# Patient Record
Sex: FEMALE | Race: WHITE | NOT HISPANIC OR LATINO | URBAN - METROPOLITAN AREA
[De-identification: names, ages, dates, MRNs, and addresses within clinical notes are randomized per-mention and may not be internally consistent; named-entity substitution may affect disease eponyms.]

---

## 2017-03-30 ENCOUNTER — OFFICE (AMBULATORY)
Dept: URBAN - METROPOLITAN AREA CLINIC 64 | Facility: CLINIC | Age: 67
End: 2017-03-30

## 2017-03-30 VITALS
SYSTOLIC BLOOD PRESSURE: 127 MMHG | HEART RATE: 70 BPM | DIASTOLIC BLOOD PRESSURE: 79 MMHG | HEIGHT: 66 IN | WEIGHT: 205 LBS

## 2017-03-30 DIAGNOSIS — K57.32 DIVERTICULITIS OF LARGE INTESTINE WITHOUT PERFORATION OR ABS: ICD-10-CM

## 2017-03-30 PROCEDURE — 99214 OFFICE O/P EST MOD 30 MIN: CPT

## 2017-03-30 RX ORDER — POLYETHYLENE GLYCOL 3350 17 G/17G
17 POWDER, FOR SOLUTION ORAL
Qty: 1 | Refills: 11 | Status: COMPLETED
Start: 2017-03-30 | End: 2023-07-11

## 2017-04-27 ENCOUNTER — HOSPITAL ENCOUNTER (OUTPATIENT)
Dept: MAMMOGRAPHY | Facility: HOSPITAL | Age: 67
Discharge: HOME | End: 2017-04-27

## 2018-04-05 ENCOUNTER — HOSPITAL ENCOUNTER (OUTPATIENT)
Dept: FAMILY MEDICINE CLINIC | Facility: CLINIC | Age: 68
Setting detail: SPECIMEN
Discharge: HOME | End: 2018-04-05
Attending: HOSPITALIST | Admitting: HOSPITALIST

## 2018-04-05 LAB
ALBUMIN SERPL-MCNC: 3.8 G/DL (ref 3.5–4.8)
ALBUMIN/GLOB SERPL: 1.2 {RATIO} (ref 1–1.7)
ALP SERPL-CCNC: 74 IU/L (ref 32–91)
ALT SERPL-CCNC: 19 IU/L (ref 14–54)
ANION GAP SERPL CALC-SCNC: 10.7 MMOL/L (ref 10–20)
AST SERPL-CCNC: 21 IU/L (ref 15–41)
BASOPHILS # BLD AUTO: 0.1 10*3/UL (ref 0–0.2)
BASOPHILS NFR BLD AUTO: 1 % (ref 0–2)
BILIRUB SERPL-MCNC: 0.4 MG/DL (ref 0.3–1.2)
BUN SERPL-MCNC: 12 MG/DL (ref 8–20)
BUN/CREAT SERPL: 17.1 (ref 5.4–26.2)
CALCIUM SERPL-MCNC: 9 MG/DL (ref 8.9–10.3)
CHLORIDE SERPL-SCNC: 101 MMOL/L (ref 101–111)
CHOLEST SERPL-MCNC: 198 MG/DL
CHOLEST/HDLC SERPL: 3.4 {RATIO}
CONV CO2: 29 MMOL/L (ref 22–32)
CONV LDL CHOLESTEROL DIRECT: 98 MG/DL (ref 0–100)
CONV TOTAL PROTEIN: 7.1 G/DL (ref 6.1–7.9)
CREAT UR-MCNC: 0.7 MG/DL (ref 0.4–1)
DIFFERENTIAL METHOD BLD: (no result)
EOSINOPHIL # BLD AUTO: 0.2 10*3/UL (ref 0–0.3)
EOSINOPHIL # BLD AUTO: 2 % (ref 0–3)
ERYTHROCYTE [DISTWIDTH] IN BLOOD BY AUTOMATED COUNT: 13.5 % (ref 11.5–14.5)
GLOBULIN UR ELPH-MCNC: 3.3 G/DL (ref 2.5–3.8)
GLUCOSE SERPL-MCNC: 96 MG/DL (ref 65–99)
HCT VFR BLD AUTO: 44.1 % (ref 35–49)
HDLC SERPL-MCNC: 59 MG/DL
HGB BLD-MCNC: 14.7 G/DL (ref 12–15)
LDLC/HDLC SERPL: 1.7 {RATIO}
LIPID INTERPRETATION: ABNORMAL
LYMPHOCYTES # BLD AUTO: 2.4 10*3/UL (ref 0.8–4.8)
LYMPHOCYTES NFR BLD AUTO: 26 % (ref 18–42)
MCH RBC QN AUTO: 30.6 PG (ref 26–32)
MCHC RBC AUTO-ENTMCNC: 33.3 G/DL (ref 32–36)
MCV RBC AUTO: 91.9 FL (ref 80–94)
MONOCYTES # BLD AUTO: 0.6 10*3/UL (ref 0.1–1.3)
MONOCYTES NFR BLD AUTO: 7 % (ref 2–11)
NEUTROPHILS # BLD AUTO: 5.8 10*3/UL (ref 2.3–8.6)
NEUTROPHILS NFR BLD AUTO: 64 % (ref 50–75)
NRBC BLD AUTO-RTO: 0 /100{WBCS}
NRBC/RBC NFR BLD MANUAL: 0 10*3/UL
PLATELET # BLD AUTO: 322 10*3/UL (ref 150–450)
PMV BLD AUTO: 6.7 FL (ref 7.4–10.4)
POTASSIUM SERPL-SCNC: 3.7 MMOL/L (ref 3.6–5.1)
RBC # BLD AUTO: 4.8 10*6/UL (ref 4–5.4)
SODIUM SERPL-SCNC: 137 MMOL/L (ref 136–144)
TRIGL SERPL-MCNC: 214 MG/DL
VLDLC SERPL CALC-MCNC: 40.5 MG/DL
WBC # BLD AUTO: 9 10*3/UL (ref 4.5–11.5)

## 2018-04-06 LAB — H PYLORI IGG SER IA-ACNC: NEGATIVE

## 2018-08-17 ENCOUNTER — HOSPITAL ENCOUNTER (OUTPATIENT)
Dept: OTHER | Facility: HOSPITAL | Age: 68
Setting detail: SPECIMEN
Discharge: HOME | End: 2018-08-17
Attending: INTERNAL MEDICINE | Admitting: INTERNAL MEDICINE

## 2018-08-17 ENCOUNTER — ON CAMPUS - OUTPATIENT (AMBULATORY)
Dept: URBAN - METROPOLITAN AREA HOSPITAL 2 | Facility: HOSPITAL | Age: 68
End: 2018-08-17

## 2018-08-17 ENCOUNTER — OFFICE (AMBULATORY)
Dept: URBAN - METROPOLITAN AREA PATHOLOGY 4 | Facility: PATHOLOGY | Age: 68
End: 2018-08-17

## 2018-08-17 VITALS
HEART RATE: 59 BPM | SYSTOLIC BLOOD PRESSURE: 108 MMHG | RESPIRATION RATE: 20 BRPM | TEMPERATURE: 97.2 F | HEART RATE: 62 BPM | OXYGEN SATURATION: 99 % | DIASTOLIC BLOOD PRESSURE: 69 MMHG | SYSTOLIC BLOOD PRESSURE: 98 MMHG | HEART RATE: 70 BPM | HEART RATE: 54 BPM | OXYGEN SATURATION: 92 % | WEIGHT: 210 LBS | RESPIRATION RATE: 18 BRPM | DIASTOLIC BLOOD PRESSURE: 62 MMHG | OXYGEN SATURATION: 98 % | HEART RATE: 57 BPM | DIASTOLIC BLOOD PRESSURE: 57 MMHG | HEART RATE: 81 BPM | HEART RATE: 60 BPM | DIASTOLIC BLOOD PRESSURE: 56 MMHG | RESPIRATION RATE: 15 BRPM | SYSTOLIC BLOOD PRESSURE: 136 MMHG | OXYGEN SATURATION: 96 % | SYSTOLIC BLOOD PRESSURE: 109 MMHG | DIASTOLIC BLOOD PRESSURE: 66 MMHG | OXYGEN SATURATION: 94 % | SYSTOLIC BLOOD PRESSURE: 88 MMHG | SYSTOLIC BLOOD PRESSURE: 104 MMHG | RESPIRATION RATE: 16 BRPM | SYSTOLIC BLOOD PRESSURE: 106 MMHG | OXYGEN SATURATION: 95 % | DIASTOLIC BLOOD PRESSURE: 68 MMHG | HEART RATE: 56 BPM | SYSTOLIC BLOOD PRESSURE: 117 MMHG | HEIGHT: 66 IN | DIASTOLIC BLOOD PRESSURE: 88 MMHG

## 2018-08-17 DIAGNOSIS — D12.3 BENIGN NEOPLASM OF TRANSVERSE COLON: ICD-10-CM

## 2018-08-17 DIAGNOSIS — K64.0 FIRST DEGREE HEMORRHOIDS: ICD-10-CM

## 2018-08-17 DIAGNOSIS — K22.2 ESOPHAGEAL OBSTRUCTION: ICD-10-CM

## 2018-08-17 DIAGNOSIS — R13.10 DYSPHAGIA, UNSPECIFIED: ICD-10-CM

## 2018-08-17 DIAGNOSIS — Z86.010 PERSONAL HISTORY OF COLONIC POLYPS: ICD-10-CM

## 2018-08-17 DIAGNOSIS — K57.30 DIVERTICULOSIS OF LARGE INTESTINE WITHOUT PERFORATION OR ABS: ICD-10-CM

## 2018-08-17 DIAGNOSIS — K22.5 DIVERTICULUM OF ESOPHAGUS, ACQUIRED: ICD-10-CM

## 2018-08-17 LAB
GI HISTOLOGY: A. UNSPECIFIED: (no result)
GI HISTOLOGY: PDF REPORT: (no result)

## 2018-08-17 PROCEDURE — 43235 EGD DIAGNOSTIC BRUSH WASH: CPT | Mod: 59 | Performed by: INTERNAL MEDICINE

## 2018-08-17 PROCEDURE — 88305 TISSUE EXAM BY PATHOLOGIST: CPT | Mod: 26 | Performed by: INTERNAL MEDICINE

## 2018-08-17 PROCEDURE — 45385 COLONOSCOPY W/LESION REMOVAL: CPT | Mod: PT | Performed by: INTERNAL MEDICINE

## 2018-08-17 PROCEDURE — 43450 DILATE ESOPHAGUS 1/MULT PASS: CPT | Performed by: INTERNAL MEDICINE

## 2018-08-20 ENCOUNTER — HOSPITAL ENCOUNTER (OUTPATIENT)
Dept: FAMILY MEDICINE CLINIC | Facility: CLINIC | Age: 68
Setting detail: SPECIMEN
Discharge: HOME | End: 2018-08-20
Attending: HOSPITALIST | Admitting: HOSPITALIST

## 2018-08-20 LAB
AMPICILLIN SUSC ISLT: NORMAL
AZTREONAM SUSC ISLT: NORMAL
BACTERIA ISLT: NORMAL
BACTERIA SPEC AEROBE CULT: NORMAL
CEFAZOLIN SUSC ISLT: NORMAL
CEFEPIME SUSC ISLT: NORMAL
CEFTAZIDIME SUSC ISLT: NORMAL
CEFTRIAXONE SUSC ISLT: NORMAL
COLONY COUNT: NORMAL
LEVOFLOXACIN SUSC ISLT: NORMAL
Lab: NORMAL
MEROPENEM SUSC ISLT: NORMAL
MICRO REPORT STATUS: NORMAL
NITROFURANTOIN SUSC ISLT: NORMAL
PIP+TAZO SUSC ISLT: NORMAL
SPECIMEN SOURCE: NORMAL
SUSC METH SPEC: NORMAL
TETRACYCLINE SUSC ISLT: NORMAL
TOBRAMYCIN SUSC ISLT: NORMAL
TRIMETHOPRIM/SULFA: NORMAL

## 2018-11-27 ENCOUNTER — HOSPITAL ENCOUNTER (OUTPATIENT)
Dept: FAMILY MEDICINE CLINIC | Facility: CLINIC | Age: 68
Setting detail: SPECIMEN
Discharge: HOME | End: 2018-11-27
Attending: HOSPITALIST | Admitting: HOSPITALIST

## 2018-11-27 LAB
AMPICILLIN SUSC ISLT: NORMAL
AZTREONAM SUSC ISLT: NORMAL
BACTERIA ISLT: NORMAL
BACTERIA SPEC AEROBE CULT: NORMAL
CEFAZOLIN SUSC ISLT: NORMAL
CEFEPIME SUSC ISLT: NORMAL
CEFTAZIDIME SUSC ISLT: NORMAL
CEFTRIAXONE SUSC ISLT: NORMAL
CIPROFLOXACIN SUSC ISLT: NORMAL
COLONY COUNT: NORMAL
ERTAPENEM SUSC ISLT: NORMAL
LEVOFLOXACIN SUSC ISLT: NORMAL
Lab: NORMAL
MEROPENEM SUSC ISLT: NORMAL
MICRO REPORT STATUS: NORMAL
NITROFURANTOIN SUSC ISLT: NORMAL
PIP+TAZO SUSC ISLT: NORMAL
SPECIMEN SOURCE: NORMAL
SUSC METH SPEC: NORMAL
TETRACYCLINE SUSC ISLT: NORMAL
TOBRAMYCIN SUSC ISLT: NORMAL
TRIMETHOPRIM/SULFA: NORMAL

## 2019-02-20 ENCOUNTER — HOSPITAL ENCOUNTER (OUTPATIENT)
Dept: SPEECH THERAPY | Facility: HOSPITAL | Age: 69
Setting detail: RECURRING SERIES
Discharge: HOME | End: 2019-04-27
Attending: OTOLARYNGOLOGY | Admitting: OTOLARYNGOLOGY

## 2019-03-26 ENCOUNTER — HOSPITAL ENCOUNTER (OUTPATIENT)
Dept: MAMMOGRAPHY | Facility: HOSPITAL | Age: 69
Discharge: HOME | End: 2019-03-26
Attending: HOSPITALIST | Admitting: HOSPITALIST

## 2019-03-26 ENCOUNTER — HOSPITAL ENCOUNTER (OUTPATIENT)
Dept: FAMILY MEDICINE CLINIC | Facility: CLINIC | Age: 69
Setting detail: SPECIMEN
Discharge: HOME | End: 2019-03-26
Attending: HOSPITALIST | Admitting: HOSPITALIST

## 2019-03-27 LAB — MYELOPEROXIDASE AB SER-ACNC: <0.2 U

## 2019-04-02 ENCOUNTER — HOSPITAL ENCOUNTER (OUTPATIENT)
Dept: CT IMAGING | Facility: HOSPITAL | Age: 69
Discharge: HOME | End: 2019-04-02
Attending: HOSPITALIST | Admitting: HOSPITALIST

## 2019-04-02 LAB — CREAT BLDA-MCNC: 0.9 MG/DL (ref 0.6–1.3)

## 2019-05-30 ENCOUNTER — HOSPITAL ENCOUNTER (OUTPATIENT)
Dept: FAMILY MEDICINE CLINIC | Facility: CLINIC | Age: 69
Setting detail: SPECIMEN
Discharge: HOME | End: 2019-05-30
Attending: HOSPITALIST | Admitting: HOSPITALIST

## 2019-05-30 ENCOUNTER — CONVERSION ENCOUNTER (OUTPATIENT)
Dept: FAMILY MEDICINE CLINIC | Facility: CLINIC | Age: 69
End: 2019-05-30

## 2019-05-30 LAB
ALBUMIN SERPL-MCNC: 3.7 G/DL (ref 3.5–4.8)
ALBUMIN/GLOB SERPL: 1.1 {RATIO} (ref 1–1.7)
ALP SERPL-CCNC: 89 IU/L (ref 32–91)
ALT SERPL-CCNC: 23 IU/L (ref 14–54)
ANION GAP SERPL CALC-SCNC: 14 MMOL/L (ref 10–20)
AST SERPL-CCNC: 23 IU/L (ref 15–41)
BASOPHILS # BLD AUTO: 0.1 10*3/UL (ref 0–0.2)
BASOPHILS NFR BLD AUTO: 1 % (ref 0–2)
BILIRUB SERPL-MCNC: 0.2 MG/DL (ref 0.3–1.2)
BUN SERPL-MCNC: 8 MG/DL (ref 8–20)
BUN/CREAT SERPL: 10 (ref 5.4–26.2)
CALCIUM SERPL-MCNC: 9.3 MG/DL (ref 8.9–10.3)
CHLORIDE SERPL-SCNC: 100 MMOL/L (ref 101–111)
CHOLEST SERPL-MCNC: 212 MG/DL
CHOLEST/HDLC SERPL: 4.2 {RATIO}
CONV CO2: 28 MMOL/L (ref 22–32)
CONV LDL CHOLESTEROL DIRECT: 125 MG/DL (ref 0–100)
CONV TOTAL PROTEIN: 7.2 G/DL (ref 6.1–7.9)
CREAT UR-MCNC: 0.8 MG/DL (ref 0.4–1)
DIFFERENTIAL METHOD BLD: (no result)
EOSINOPHIL # BLD AUTO: 0.2 10*3/UL (ref 0–0.3)
EOSINOPHIL # BLD AUTO: 2 % (ref 0–3)
ERYTHROCYTE [DISTWIDTH] IN BLOOD BY AUTOMATED COUNT: 13.7 % (ref 11.5–14.5)
FOLATE SERPL-MCNC: 21.5 NG/ML (ref 5.9–24.8)
GLOBULIN UR ELPH-MCNC: 3.5 G/DL (ref 2.5–3.8)
GLUCOSE SERPL-MCNC: 100 MG/DL (ref 65–99)
HCT VFR BLD AUTO: 44.8 % (ref 35–49)
HDLC SERPL-MCNC: 51 MG/DL
HGB BLD-MCNC: 14.8 G/DL (ref 12–15)
LDLC/HDLC SERPL: 2.5 {RATIO}
LIPID INTERPRETATION: ABNORMAL
LYMPHOCYTES # BLD AUTO: 2 10*3/UL (ref 0.8–4.8)
LYMPHOCYTES NFR BLD AUTO: 20 % (ref 18–42)
MCH RBC QN AUTO: 30.5 PG (ref 26–32)
MCHC RBC AUTO-ENTMCNC: 33 G/DL (ref 32–36)
MCV RBC AUTO: 92.3 FL (ref 80–94)
MONOCYTES # BLD AUTO: 1 10*3/UL (ref 0.1–1.3)
MONOCYTES NFR BLD AUTO: 10 % (ref 2–11)
NEUTROPHILS # BLD AUTO: 6.6 10*3/UL (ref 2.3–8.6)
NEUTROPHILS NFR BLD AUTO: 67 % (ref 50–75)
NRBC BLD AUTO-RTO: 0 /100{WBCS}
NRBC/RBC NFR BLD MANUAL: 0 10*3/UL
PLATELET # BLD AUTO: 321 10*3/UL (ref 150–450)
PMV BLD AUTO: 7 FL (ref 7.4–10.4)
POTASSIUM SERPL-SCNC: 4 MMOL/L (ref 3.6–5.1)
RBC # BLD AUTO: 4.85 10*6/UL (ref 4–5.4)
SODIUM SERPL-SCNC: 138 MMOL/L (ref 136–144)
T4 FREE SERPL-MCNC: 0.9 NG/DL (ref 0.58–1.64)
TRIGL SERPL-MCNC: 288 MG/DL
TSH SERPL-ACNC: 0.66 UIU/ML (ref 0.34–5.6)
VIT B12 SERPL-MCNC: 477 PG/ML (ref 180–914)
VLDLC SERPL CALC-MCNC: 36.6 MG/DL
WBC # BLD AUTO: 9.8 10*3/UL (ref 4.5–11.5)

## 2019-05-31 LAB — 25(OH)D3 SERPL-MCNC: 37 NG/ML (ref 30–100)

## 2019-06-04 VITALS
SYSTOLIC BLOOD PRESSURE: 118 MMHG | DIASTOLIC BLOOD PRESSURE: 68 MMHG | BODY MASS INDEX: 36 KG/M2 | RESPIRATION RATE: 12 BRPM | HEIGHT: 66 IN | WEIGHT: 224 LBS | HEART RATE: 80 BPM

## 2019-06-06 NOTE — PROGRESS NOTES
Vital Signs:    Patient Profile:    69 Years Old Female  Height:     66 inches  Weight:     224 pounds  BMI:        36.15     Temp:       98.1 degrees F oral  Pulse rate: 80 / minute  Resp:       12 per minute  BP Sittin / 68  (left arm)        Problems: Active problems were reviewed with the patient during this visit.  Medications: Medications were reviewed with the patient during this visit.  Allergies: Allergies were reviewed with the patient during this visit.        Vitals Entered By: Helene LUZ (May 30, 2019 10:23 AM)      Referring Provider:  Jordy Hou MD  Primary Provider:  Jordy Hou MD    CC:  medicare wellness.    History of Present Illness:         The patient comes in today for a Annual exam.  Here for wellness and physical.  Still with dysphonia and has seen 4 different doctors and still no better.           When questioned about any new concerns, the patient voices concerns with nothing in particular.  When asked about their health maintenence items, they report their Colonoscopy is UTD and Mammogram is UTD.      ROS: The review of systems is negative for chest pain, shortness of breath, headache, vision change, nausea, vomiting, diarrhea, abdominal pain, bowel or bladder problems, significant joint problems, or skin related issues        Past Medical History:     Reviewed history from 2014 and no changes required:        Health Maintenence: PAP (S/P Hysterectomy)        GYN taking care of this        Health Maintenence: Mammogram (UTD)        Health Maintenence: Colonoscopy (last done )        Health Maintenence: Colonoscopy (next one is needed in 3 years)        HEENT Hx: TMJ syndrome        Respiratory Hx: histoplasmosis        Musculoskeletal Hx: calcaneal fracture        Colon polyps        Hyperlipidemia        Migraine headaches        Osteoarthritis        Allergies        Generalized anxiety disorder        Hypertension    Past Surgical  History:     Reviewed history from 12/06/2012 and no changes required:        cholecystectomy        partial hysterectomy        rt ear        cataracts    Family History Summary:      Reviewed history Last on 03/26/2019 and no changes required:05/30/2019      General Comments - FH:  FH Heart Disease  FH Lung/Respiratory Disease  FH Colon Cancer  Family History of Lymphoma  FH Thyroid Problems  Rh. Arthritis  Parkinson's   COPD      Social History:     Reviewed history from 06/05/2018 and no changes required:        Patient has never smoked.        Passive Smoke: N        Alcohol Use: N                Risk Factors:     Smoked Tobacco Use:  Never smoker  Smokeless Tobacco Use:  Never    Previous Tobacco Use: Signed On - 03/26/2019  Smoked Tobacco Use:  Never smoker  Smokeless Tobacco Use:  Never  Passive smoke exposure:  no    Previous Alcohol Use: Signed On - 03/26/2019  Alcohol use:  no  Seatbelt use:  100 %    Colonoscopy History:     Date of Last Colonoscopy:  05/20/2015    Mammogram History:     Date of Last Mammogram:  04/27/2017        Physical Exam   Height:  66  Weight:  223  BP:  118/68 mm HG    Medication List:  SINGULAIR 10 MG ORAL TABLET (MONTELUKAST SODIUM) one tablet by mouth daily  BACTRIM -160 MG ORAL TABLET (SULFAMETHOXAZOLE-TRIMETHOPRIM) 1 po bid  TRANSDERM-SCOP (1.5 MG) 1 MG/3DAYS TRANSDERMAL PATCH 72 HOUR (SCOPOLAMINE BASE) apply one patch 4 hour prior to departure then one every 3 days  OMEPRAZOLE 40MG CAPSULES (OMEPRAZOLE) TAKE ONE CAPSULE BY MOUTH DAILY  FLUTICASONE 50MCG NASAL SP (120) RX (FLUTICASONE PROPIONATE) USE 2 SPRAYS IN EACH NOSTRIL DAILY  ESCITALOPRAM 10MG TABLETS (ESCITALOPRAM OXALATE) TAKE 1 TABLET BY MOUTH EVERY DAY  ATIVAN 1 MG ORAL TABLET (LORAZEPAM) take 1-2  at bedtime  AMLODIPINE-BENAZ 5/10MG CAPSULES (AMLODIPINE BESY-BENAZEPRIL HCL) TAKE 1 CAPSULE BY MOUTH EVERY DAY      Depression   During the past month have you often been bothered by feeling down, depressed, or  hopeless? No  During the past month have you often been bothered by little interest or pleasure in doing things? No  Reviewed Depression Screening Questionnaire Yes    Surgical History   cholecystectomy  partial hysterectomy  rt ear  cataracts,    Risk Factors  Tobacco Use: Never smoker  Passive smoke exposure: no    Advanced directives discussed:   yes  Advanced directives reviewed:  yes    Level of Function   Hearing Impairment   Do you have a hearing problem now? Yes     Hearing Device none    Olson Index of Mcallen in Activities of Daily Living  BATHING: Mcallen  DRESSING: Mcallen  TOILETING: Mcallen  TRANSFERRING: Mcallen  CONTINENCE: Mcallen  FEEDING: Mcallen  ADL Score: 0  The patient is independent.    Home Safety   Do you have adequate housing? yes  Do you have transportation? yes  Do you have sufficient food? yes  Do you have access to a telephone? yes  Actual or threats of physical, emotional abuse? no  Do you have actual or threats of sexual abuse? no  Do you feel safe at home? yes  Home Safety Assessment Needed?   no    Falls Information:   Falls in the past 2 months? no  Falls in the past 6 months? no  CNS to follow up? no    Balance:   Are there difficulties with balance? no  Have you ever experienced lightheadedness/dizziness w/ position changes? no  Do you have episodes of dizziness? no  Have you experienced blackouts? no    Hearing Impairment Assessment   Reviewed  List of providers and suppliers caring for patient:    Dr. Merida -ENT    Physical Examination   General Appearance   In no acute distress.  Alert & oriented.  Behavior and affect appropriate to situation  Skin   No suspicious lesions, moles or rashes . Turgor good.  HEENT   PERRLA, EOMI, TM's normal.  Pharynx clear  Neck   Supple.  No adenopathy  Cardiovascular   Regular rate and rhythm  Lungs   Clear to auscultation  Abdomen   Soft, non-tender.  Bowel sounds present.  No hepatosplenomegaly  Back    degenerative changes  Musculoskeletal   OA changes, degenerative changes  Neurologic   CN grossly intact  Psych   Alert and cooperative; normal mood and affect; normal attention span and concentration        Impression & Recommendations:    Problem # 1:  Preventive Health Care Exam (ICD-V70.0) (UIR33-L14.00)  This patient overall looks good related to their annual checkup.  Problem areas were addressed and they were encouraged to continue good lifestyle habits and behaviors.    Orders:  Medicare-Subsequent Year (AWV) ()  [facility] CBC W/DIFF; PATH REVIEW IF INDICATED (CBC)  FM LIPID PANEL (LIPID)  FM COMPREHENSIVE METABOLIC PANEL (CMP) (MPC)  FM VITAMIN B12 (B12)  [facility] FOLATE (FOL)  [facility] VITAMIN D TOTAL (VITD)  [facility] THYROID STIMULATING HORMONE (TSH) (TSH)  [facility] T4 THYROXINE FREE (FT4)      Problem # 2:  ITCHING AND PRURITIS (ICD-698.9) (KYM57-D39.9)  continue meds  Orders:  Medicare-Subsequent Year (AWV) ()  [facility] CBC W/DIFF; PATH REVIEW IF INDICATED (CBC)  FM LIPID PANEL (LIPID)  [facility] COMPREHENSIVE METABOLIC PANEL (CMP) (MPC)  [facility] VITAMIN B12 (B12)  [facility] FOLATE (FOL)  [facility] VITAMIN D TOTAL (VITD)  [facility] THYROID STIMULATING HORMONE (TSH) (TSH)  [facility] T4 THYROXINE FREE (FT4)      Problem # 3:  OSTEOARTHRITIS (ICD-715.09) (GDN35-X98.9)  stable    Problem # 4:  HOARSENESS (ICD-784.49) (GIV14-K81.3)  may need another opinon  Orders:  Medicare-Subsequent Year (AWV) ()  [facility] CBC W/DIFF; PATH REVIEW IF INDICATED (CBC)  [facility] LIPID PANEL (LIPID)  [facility] COMPREHENSIVE METABOLIC PANEL (CMP) (MPC)  [facility] VITAMIN B12 (B12)  [facility] FOLATE (FOL)  [facility] VITAMIN D TOTAL (VITD)  [facility] THYROID STIMULATING HORMONE (TSH) (TSH)  [facility] T4 THYROXINE FREE (FT4)      Problem # 5:  HYPERTENSION (ICD-401.9) (IXI97-U53)    Her updated medication list for this problem includes:     Amlodipine-benaz 5/10mg Capsules (Amlodipine besy-benazepril hcl) ..... Take 1 capsule by mouth every day    Orders:  Medicare-Subsequent Year (AWV) ()  [facility] CBC W/DIFF; PATH  REVIEW IF INDICATED (CBC)  [facility] LIPID PANEL (LIPID)  [facility] COMPREHENSIVE METABOLIC PANEL (CMP) (MPC)  [facility] VITAMIN B12 (B12)  [facility] FOLATE (FOL)  [facility] VITAMIN D TOTAL (VITD)  [facility] THYROID STIMULATING HORMONE (TSH) (TSH)  [facility] T4 THYROXINE FREE (FT4)    BP today: 118/68  Prior BP: 108/76 (03/26/2019)    Labs Reviewed:  Creat: 0.7 (04/05/2018)  Chol: 198 (04/05/2018)   HDL: 59 (04/05/2018)   LDL: 98 (04/05/2018)         Problem # 6:  Sinusitis Acute (ICD-461.9) (AVK99-P17.90)    The following medications were removed from the medication list:     Bactrim Ds 800-160 Mg Oral Tablet (Sulfamethoxazole-trimethoprim) ..... 1 po bid    Her updated medication list for this problem includes:     Cefuroxime Axetil 500 Mg Oral Tablet (Cefuroxime axetil) ..... 1 tablet bid     Fluticasone 50mcg Nasal Sp (120) Rx (Fluticasone propionate) ..... Use 2 sprays in each nostril daily    Orders:  Medicare-Subsequent Year (AWV) ()  [facility] CBC W/DIFF; PATH REVIEW IF INDICATED (CBC)  [facility] LIPID PANEL (LIPID)  [facility] COMPREHENSIVE METABOLIC PANEL (CMP) (MPC)  [facility] VITAMIN B12 (B12)  [facility] FOLATE (FOL)  [facility] VITAMIN D TOTAL (VITD)  [facility] THYROID STIMULATING HORMONE (TSH) (TSH)  [facility] T4 THYROXINE FREE (FT4)      Medications Added to Medication List This Visit:  1)  Cefuroxime Axetil 500 Mg Oral Tablet (Cefuroxime axetil) .... 1 tablet bid      Patient Instructions:  1)  During this visit for their annual exam, we reviewed their personal history, social history and family history.  We went over their medications and all the recommended health maintenence items for their age group. They were given the opportunity to ask   questions and discuss other concerns.  2)  Please schedule a follow-up appointment as needed.                Medication Administration    Orders Added:  1)  Medicare-Subsequent Year (AWV) []  2)  [facility] CBC W/DIFF; PATH REVIEW IF INDICATED [CBC]  3)  [facility] LIPID PANEL [LIPID]  4)  [facility] COMPREHENSIVE METABOLIC PANEL (CMP) [MPC]  5)  [facility] VITAMIN  B12 [B12]  6)  [facility] FOLATE [FOL]  7)  [facility] VITAMIN D TOTAL [VITD]  8)  [facility] THYROID STIMULATING HORMONE (TSH) [TSH]  9)  [facility] T4 THYROXINE FREE [FT4]          Medications:  ESCITALOPRAM 10MG TABLETS (ESCITALOPRAM OXALATE) TAKE 1 TABLET BY MOUTH EVERY DAY  #90[Tablet] x 3      Entered and Authorized by:  Jordy Hou MD      Electronically signed by:   Jordy Hou MD on 05/30/2019      Method used:    Electronically to               [id number]* (retail)              [address] [place] [address]              Ph: [phone]              Fax: [phone]      RxID:   [id number]  AMLODIPINE-BENAZ 5/10MG CAPSULES (AMLODIPINE BESY-BENAZEPRIL HCL) TAKE 1 CAPSULE BY MOUTH EVERY DAY  #90[Capsule] x 3      Entered and Authorized by:  Jordy Hou MD      Electronically signed by:   Jordy Hou MD on 05/30/2019      Method used:    Electronically to               [facility] [address]* (retail)              [address] [place] [address]              Ph: [phone]              Fax: [phone]      RxID:   [id number]  CEFUROXIME AXETIL 500 MG ORAL TABLET (CEFUROXIME AXETIL) 1 tablet bid  #20[Tablet] x 0      Entered and Authorized by:  Jordy Hou MD      Electronically signed by:   Jordy Hou MD on 05/30/2019      Method used:    Electronically to               [facility] [id number]* (retail)              [address] [place] [address]              Ph: [phone]              Fax: [phone]      Note to Pharmacy: Route: ORAL;       RxID:   [id number]        PHQ-9  PHQ-9 completed, Score: 0 - Not Clinically Depressed        Electronically signed by Jordy Hou MD on 05/30/2019 at 10:49 AM  ________________________________________________________________________       Disclaimer: Converted Note message may not contain all data elements that existed  in the legacy source system. Please see [facility] for the original note details.

## 2019-07-03 ENCOUNTER — TELEPHONE (OUTPATIENT)
Dept: FAMILY MEDICINE CLINIC | Facility: CLINIC | Age: 69
End: 2019-07-03

## 2019-07-03 DIAGNOSIS — R49.0 HOARSENESS: ICD-10-CM

## 2019-07-03 DIAGNOSIS — R20.0 FACIAL NUMBNESS: Primary | ICD-10-CM

## 2019-07-03 NOTE — TELEPHONE ENCOUNTER
Patient came in to office stating she lives far away and was hoping to see Doctor. She states she has had this problem with her throat where she can hardly speak for the past year now but this past week has progressively gotten worse where now when she tries to speak the left side of her face and down to the left side of her arm goes numb and she feels very light headed.   She wants to know what Doctor would recommend for her to do? She doesn't want to go to Urgent care.

## 2019-07-03 NOTE — TELEPHONE ENCOUNTER
Please create order/referral and diagnosis in chart then this will fall in to my work queue to process referral.   PER [facility] TEAM       Thanks, Meghan

## 2019-07-11 RX ORDER — LORAZEPAM 1 MG/1
TABLET ORAL
Qty: 60 TABLET | Refills: 0 | Status: SHIPPED | OUTPATIENT
Start: 2019-07-11 | End: 2019-08-09 | Stop reason: SDUPTHER

## 2019-08-06 ENCOUNTER — TRANSCRIBE ORDERS (OUTPATIENT)
Dept: ADMINISTRATIVE | Facility: HOSPITAL | Age: 69
End: 2019-08-06

## 2019-08-06 DIAGNOSIS — J38.00 VOCAL CORD PARESIS: Primary | ICD-10-CM

## 2019-08-09 ENCOUNTER — LAB (OUTPATIENT)
Dept: FAMILY MEDICINE CLINIC | Facility: CLINIC | Age: 69
End: 2019-08-09

## 2019-08-09 ENCOUNTER — OFFICE VISIT (OUTPATIENT)
Dept: FAMILY MEDICINE CLINIC | Facility: CLINIC | Age: 69
End: 2019-08-09

## 2019-08-09 VITALS
DIASTOLIC BLOOD PRESSURE: 80 MMHG | TEMPERATURE: 98.8 F | RESPIRATION RATE: 8 BRPM | SYSTOLIC BLOOD PRESSURE: 120 MMHG | BODY MASS INDEX: 34.06 KG/M2 | HEART RATE: 88 BPM | WEIGHT: 211 LBS

## 2019-08-09 DIAGNOSIS — E78.2 MIXED HYPERLIPIDEMIA: ICD-10-CM

## 2019-08-09 DIAGNOSIS — R49.0 HOARSENESS: Primary | ICD-10-CM

## 2019-08-09 DIAGNOSIS — R49.0 HOARSENESS: ICD-10-CM

## 2019-08-09 PROBLEM — Z80.0 FAMILY HISTORY OF MALIGNANT NEOPLASM OF COLON: Status: ACTIVE | Noted: 2019-08-09

## 2019-08-09 PROBLEM — F41.1 GENERALIZED ANXIETY DISORDER: Status: ACTIVE | Noted: 2019-08-09

## 2019-08-09 PROBLEM — M19.90 OSTEOARTHRITIS: Status: ACTIVE | Noted: 2019-08-09

## 2019-08-09 PROBLEM — E78.5 HYPERLIPIDEMIA: Status: ACTIVE | Noted: 2019-08-09

## 2019-08-09 PROBLEM — K63.5 COLON POLYPS: Status: ACTIVE | Noted: 2019-08-09

## 2019-08-09 LAB
ALBUMIN SERPL-MCNC: 3.8 G/DL (ref 3.5–4.8)
ALBUMIN/GLOB SERPL: 1.2 G/DL (ref 1–1.7)
ALP SERPL-CCNC: 85 U/L (ref 32–91)
ALT SERPL W P-5'-P-CCNC: 24 U/L (ref 14–54)
ANION GAP SERPL CALCULATED.3IONS-SCNC: 14.2 MMOL/L (ref 5–15)
ARTICHOKE IGE QN: 117 MG/DL (ref 0–100)
AST SERPL-CCNC: 26 U/L (ref 15–41)
BILIRUB SERPL-MCNC: 0.8 MG/DL (ref 0.3–1.2)
BUN BLD-MCNC: 10 MG/DL (ref 8–20)
BUN/CREAT SERPL: 12.5 (ref 5.4–26.2)
CALCIUM SPEC-SCNC: 9.3 MG/DL (ref 8.9–10.3)
CHLORIDE SERPL-SCNC: 100 MMOL/L (ref 101–111)
CHOLEST SERPL-MCNC: 187 MG/DL
CO2 SERPL-SCNC: 26 MMOL/L (ref 22–32)
CREAT BLD-MCNC: 0.8 MG/DL (ref 0.4–1)
GFR SERPL CREATININE-BSD FRML MDRD: 72 ML/MIN/1.73
GLOBULIN UR ELPH-MCNC: 3.1 GM/DL (ref 2.5–3.8)
GLUCOSE BLD-MCNC: 97 MG/DL (ref 65–99)
HDLC SERPL QL: 4.07
HDLC SERPL-MCNC: 46 MG/DL
LDLC/HDLC SERPL: 2.12 {RATIO}
POTASSIUM BLD-SCNC: 4.2 MMOL/L (ref 3.6–5.1)
PROT SERPL-MCNC: 6.9 G/DL (ref 6.1–7.9)
SODIUM BLD-SCNC: 136 MMOL/L (ref 136–144)
TRIGL SERPL-MCNC: 218 MG/DL
VLDLC SERPL-MCNC: 43.6 MG/DL

## 2019-08-09 PROCEDURE — 80053 COMPREHEN METABOLIC PANEL: CPT | Performed by: HOSPITALIST

## 2019-08-09 PROCEDURE — 36415 COLL VENOUS BLD VENIPUNCTURE: CPT

## 2019-08-09 PROCEDURE — 80061 LIPID PANEL: CPT | Performed by: HOSPITALIST

## 2019-08-09 PROCEDURE — 99213 OFFICE O/P EST LOW 20 MIN: CPT | Performed by: HOSPITALIST

## 2019-08-09 RX ORDER — AMLODIPINE BESYLATE AND BENAZEPRIL HYDROCHLORIDE 5; 10 MG/1; MG/1
CAPSULE ORAL
COMMUNITY
Start: 2019-06-03 | End: 2020-06-24

## 2019-08-09 RX ORDER — LEVOCETIRIZINE DIHYDROCHLORIDE 5 MG/1
TABLET, FILM COATED ORAL
COMMUNITY
Start: 2019-08-03

## 2019-08-09 RX ORDER — LORAZEPAM 1 MG/1
1-2 TABLET ORAL
Qty: 60 TABLET | Refills: 0 | Status: SHIPPED | OUTPATIENT
Start: 2019-08-09 | End: 2019-09-23 | Stop reason: SDUPTHER

## 2019-08-09 RX ORDER — ESCITALOPRAM OXALATE 10 MG/1
TABLET ORAL
COMMUNITY
Start: 2019-06-03 | End: 2020-06-24

## 2019-08-09 RX ORDER — MONTELUKAST SODIUM 10 MG/1
TABLET ORAL
COMMUNITY
Start: 2019-08-03 | End: 2020-02-20 | Stop reason: SDUPTHER

## 2019-08-09 RX ORDER — OMEPRAZOLE 40 MG/1
CAPSULE, DELAYED RELEASE ORAL
COMMUNITY
Start: 2019-08-03 | End: 2019-11-21 | Stop reason: SDUPTHER

## 2019-08-09 NOTE — PROGRESS NOTES
Rooming Tab(CC,VS,Pt Hx,Fall Screen)    Patient Care Team:  Jordy Hou MD as PCP - General  Jordy Hou MD as PCP - Claims Attributed  Provider, No Known as PCP - Family Medicine    Chief Complaint   Patient presents with   • Sore Throat     lose of voice       Subjective     History was provided by the patient.  Here for f/u on voice issue.  Saw laryngologist in [place].  Left arm tingles when talking a lot.  They told her the air isn't coming out.  Vocal cords pushed sideways.      Patient states other than the above problems, they are doing ok overall and has no other significant complaints    I have reviewed and updated her medications, medical/family/social history and problem list during today's office visit.         Problem List Tab  Medications Tab  Synopsis Tab  Chart Review Tab  Care Everywhere Tab  Immunizations Tab  Patient History Tab    Social History     Tobacco Use   • Smoking status: Never Smoker   • Smokeless tobacco: Never Used   Substance Use Topics   • Alcohol use: Yes     Frequency: Monthly or less       Review of Systems   Constitutional: Negative for chills and fever.   Respiratory: Negative for chest tightness and shortness of breath.    Cardiovascular: Negative for chest pain.   Gastrointestinal: Negative for abdominal pain.   Musculoskeletal: Negative for arthralgias and myalgias.   Neurological: Negative for headache.       Objective     Rooming Tab(CC,VS,Pt Hx,Fall Screen)  /80 (BP Location: Left arm, Patient Position: Sitting, Cuff Size: Large Adult)   Pulse 88   Temp 98.8 °F (37.1 °C) (Oral)   Resp 8   Wt 95.7 kg (211 lb)   BMI 34.06 kg/m²     Wt Readings from Last 4 Encounters:   08/09/19 95.7 kg (211 lb)   05/30/19 102 kg (223 lb 16 oz)   03/26/19 101 kg (222 lb 16 oz)   12/19/18 102 kg (223 lb 12.8 oz)       Body mass index is 34.06 kg/m².    Physical Exam   Constitutional: She appears well-developed and well-nourished.   HENT:   Head:  Normocephalic.   Cardiovascular: Normal rate and regular rhythm.   No murmur heard.  Pulmonary/Chest: Breath sounds normal. She has no wheezes. She has no rales.   Abdominal: Soft. She exhibits no distension. There is no tenderness.   Skin: Skin is warm and dry.        Statin Choice Calculator  Data Reviewed:               Lab Results   Component Value Date    BUN 8 05/30/2019    CREATININE 0.8 05/30/2019     05/30/2019    K 4.0 05/30/2019     (L) 05/30/2019    CALCIUM 9.3 05/30/2019    ALBUMIN 3.7 05/30/2019    BILITOT 0.2 (L) 05/30/2019    ALKPHOS 89 05/30/2019    AST 23 05/30/2019    ALT 23 05/30/2019    TRIG 288 (H) 05/30/2019    HDL 51 05/30/2019     (H) 05/30/2019    LDLHDL 2.5 05/30/2019    WBC 9.8 05/30/2019    RBC 4.85 05/30/2019    HCT 44.8 05/30/2019    MCV 92.3 05/30/2019    MCH 30.5 05/30/2019    TSH 0.66 05/30/2019    FREET4 0.90 05/30/2019    [id number] 37 05/30/2019        Assessment/Plan   Order Review Tab  Health Maintenance Tab  Patient Plan/Order Tab  Diagnoses and all orders for this visit:    1. Hoarseness (Primary)  -     Full Pulmonary Function Test With Bronchodilator; Future  -     Comprehensive Metabolic Panel; Future  -     Lipid Panel; Future    2. Mixed hyperlipidemia  -     Comprehensive Metabolic Panel; Future  -     Lipid Panel; Future    Other orders  -     LORazepam (ATIVAN) 1 MG tablet; Take 1-2 tablets by mouth every night at bedtime.  Dispense: 60 tablet; Refill: 0      They were informed of the diagnosis and treatment plan and directed to f/u for any further problems or concerns.  They were given the opportunity to ask questions related to the visit.      Orders Placed This Encounter   Procedures   • Comprehensive Metabolic Panel     Standing Status:   Future     Standing Expiration Date:   8/9/2020   • Lipid Panel     Standing Status:   Future     Standing Expiration Date:   8/9/2020   • Full Pulmonary Function Test With Bronchodilator     Standing Status:    Future     Standing Expiration Date:   8/9/2020     Order Specific Question:   PFT Procedures to Be Performed     Answer:   DLCO     Order Specific Question:   PFT Procedures to Be Performed     Answer:   Lung volumes     Order Specific Question:   PFT Procedures to Be Performed     Answer:   Spirometry pre and post bronchodilator     Wrapup Tab  Return if symptoms worsen or fail to improve.                  [id number] Comprehensive

## 2019-08-13 ENCOUNTER — HOSPITAL ENCOUNTER (OUTPATIENT)
Dept: CT IMAGING | Facility: HOSPITAL | Age: 69
Discharge: HOME | End: 2019-08-13
Admitting: OTOLARYNGOLOGY

## 2019-08-13 DIAGNOSIS — J38.00 VOCAL CORD PARESIS: ICD-10-CM

## 2019-08-13 PROCEDURE — 0 IOPAMIDOL PER 1 ML: Performed by: OTOLARYNGOLOGY

## 2019-08-13 PROCEDURE — 70460 CT HEAD/BRAIN W/DYE: CPT

## 2019-08-13 RX ADMIN — IOPAMIDOL 50 ML: 755 INJECTION, SOLUTION INTRAVENOUS at 09:23

## 2019-08-23 ENCOUNTER — LAB (OUTPATIENT)
Dept: LAB | Facility: HOSPITAL | Age: 69
End: 2019-08-23

## 2019-08-23 ENCOUNTER — TRANSCRIBE ORDERS (OUTPATIENT)
Dept: ADMINISTRATIVE | Facility: HOSPITAL | Age: 69
End: 2019-08-23

## 2019-08-23 ENCOUNTER — HOSPITAL ENCOUNTER (OUTPATIENT)
Dept: RESPIRATORY THERAPY | Facility: HOSPITAL | Age: 69
Discharge: HOME | End: 2019-08-23
Admitting: HOSPITALIST

## 2019-08-23 VITALS
HEIGHT: 66 IN | WEIGHT: 211.8 LBS | OXYGEN SATURATION: 97 % | BODY MASS INDEX: 34.04 KG/M2 | HEART RATE: 74 BPM | RESPIRATION RATE: 19 BRPM

## 2019-08-23 DIAGNOSIS — G83.9 PARESIS (HCC): ICD-10-CM

## 2019-08-23 DIAGNOSIS — R49.0 HOARSENESS: ICD-10-CM

## 2019-08-23 DIAGNOSIS — J38.02 VOCAL FOLD PARALYSIS, BILATERAL: Primary | ICD-10-CM

## 2019-08-23 DIAGNOSIS — J38.02 VOCAL FOLD PARALYSIS, BILATERAL: ICD-10-CM

## 2019-08-23 LAB
T4 FREE SERPL-MCNC: 0.81 NG/DL (ref 0.58–1.64)
T4 SERPL-MCNC: 7.98 MCG/DL (ref 6.1–12.2)
TSH SERPL DL<=0.05 MIU/L-ACNC: 0.83 MIU/ML (ref 0.34–5.6)
VIT B12 BLD-MCNC: 794 PG/ML (ref 180–914)

## 2019-08-23 PROCEDURE — 86780 TREPONEMA PALLIDUM: CPT

## 2019-08-23 PROCEDURE — 83519 RIA NONANTIBODY: CPT

## 2019-08-23 PROCEDURE — 86665 EPSTEIN-BARR CAPSID VCA: CPT

## 2019-08-23 PROCEDURE — 86256 FLUORESCENT ANTIBODY TITER: CPT

## 2019-08-23 PROCEDURE — 82164 ANGIOTENSIN I ENZYME TEST: CPT

## 2019-08-23 PROCEDURE — 84439 ASSAY OF FREE THYROXINE: CPT

## 2019-08-23 PROCEDURE — 84443 ASSAY THYROID STIM HORMONE: CPT

## 2019-08-23 PROCEDURE — 36415 COLL VENOUS BLD VENIPUNCTURE: CPT

## 2019-08-23 PROCEDURE — 94726 PLETHYSMOGRAPHY LUNG VOLUMES: CPT

## 2019-08-23 PROCEDURE — 94060 EVALUATION OF WHEEZING: CPT

## 2019-08-23 PROCEDURE — 83520 IMMUNOASSAY QUANT NOS NONAB: CPT

## 2019-08-23 PROCEDURE — 94729 DIFFUSING CAPACITY: CPT

## 2019-08-23 PROCEDURE — 86696 HERPES SIMPLEX TYPE 2 TEST: CPT

## 2019-08-23 PROCEDURE — 82607 VITAMIN B-12: CPT

## 2019-08-23 PROCEDURE — 86431 RHEUMATOID FACTOR QUANT: CPT

## 2019-08-23 PROCEDURE — 86695 HERPES SIMPLEX TYPE 1 TEST: CPT

## 2019-08-23 PROCEDURE — 86618 LYME DISEASE ANTIBODY: CPT

## 2019-08-23 RX ORDER — ALBUTEROL SULFATE 2.5 MG/3ML
2.5 SOLUTION RESPIRATORY (INHALATION) ONCE
Status: COMPLETED | OUTPATIENT
Start: 2019-08-23 | End: 2019-08-23

## 2019-08-23 RX ADMIN — ALBUTEROL SULFATE 2.5 MG: 2.5 SOLUTION RESPIRATORY (INHALATION) at 14:54

## 2019-08-24 LAB — EBV VCA IGM SER-ACNC: <36 U/ML (ref 0–35.9)

## 2019-08-25 LAB — PROTEINASE3 AB SER IA-ACNC: <3.5 U/ML (ref 0–3.5)

## 2019-08-26 LAB
ACE SERPL-CCNC: <15 U/L (ref 14–82)
B BURGDOR IGG SER QL: NEGATIVE
C-ANCA TITR SER IF: NORMAL TITER
CHROMATIN AB SERPL-ACNC: <20 IU/ML (ref 0–20)
HSV1 IGG SER IA-ACNC: 35.9 INDEX (ref 0–0.9)
HSV2 IGG SER IA-ACNC: >23.6 INDEX (ref 0–0.9)
P-ANCA ATYPICAL TITR SER IF: NORMAL TITER
P-ANCA TITR SER IF: NORMAL TITER
T PALLIDUM IGG SER QL: NORMAL

## 2019-08-27 LAB — EBV AB TO VIRAL CAPSID AG, IGA: 2.09 U/ML

## 2019-08-30 LAB
ACHR AB SER-SCNC: 0.07 NMOL/L (ref 0–0.24)
ACHR BLOCK AB/ACHR TOTAL SFR SER: 16 % (ref 0–25)
ACHR MOD AB/ACHR TOTAL SFR SER: <12 % (ref 0–20)

## 2019-09-16 ENCOUNTER — TELEPHONE (OUTPATIENT)
Dept: FAMILY MEDICINE CLINIC | Facility: CLINIC | Age: 69
End: 2019-09-16

## 2019-09-16 RX ORDER — CIPROFLOXACIN 500 MG/1
500 TABLET, FILM COATED ORAL 2 TIMES DAILY
Qty: 20 TABLET | Refills: 0 | Status: SHIPPED | OUTPATIENT
Start: 2019-09-16 | End: 2019-09-26

## 2019-09-16 NOTE — TELEPHONE ENCOUNTER
Patient called stating that she has a UTI. Patient states that she is having pain with urination, blood in urine, and low fever. Patient is requesting antibiotics.

## 2019-09-23 RX ORDER — LORAZEPAM 1 MG/1
TABLET ORAL
Qty: 60 TABLET | Refills: 0 | Status: SHIPPED | OUTPATIENT
Start: 2019-09-23 | End: 2019-11-03 | Stop reason: SDUPTHER

## 2019-11-04 ENCOUNTER — OFFICE VISIT (OUTPATIENT)
Dept: NEUROLOGY | Facility: CLINIC | Age: 69
End: 2019-11-04

## 2019-11-04 VITALS
DIASTOLIC BLOOD PRESSURE: 80 MMHG | HEART RATE: 83 BPM | HEIGHT: 66 IN | WEIGHT: 208 LBS | SYSTOLIC BLOOD PRESSURE: 118 MMHG | BODY MASS INDEX: 33.43 KG/M2

## 2019-11-04 DIAGNOSIS — G43.109 MIGRAINE AURA WITHOUT HEADACHE: ICD-10-CM

## 2019-11-04 DIAGNOSIS — R20.0 NUMBNESS AND TINGLING OF LEFT SIDE OF FACE: Primary | ICD-10-CM

## 2019-11-04 DIAGNOSIS — F41.1 GENERALIZED ANXIETY DISORDER: ICD-10-CM

## 2019-11-04 DIAGNOSIS — R49.0 DYSPHONIA: ICD-10-CM

## 2019-11-04 DIAGNOSIS — R20.2 NUMBNESS AND TINGLING OF LEFT SIDE OF FACE: Primary | ICD-10-CM

## 2019-11-04 PROCEDURE — 99204 OFFICE O/P NEW MOD 45 MIN: CPT | Performed by: PSYCHIATRY & NEUROLOGY

## 2019-11-04 RX ORDER — LORAZEPAM 1 MG/1
TABLET ORAL
Qty: 60 TABLET | Refills: 0 | Status: SHIPPED | OUTPATIENT
Start: 2019-11-04 | End: 2019-12-23

## 2019-11-04 NOTE — PROGRESS NOTES
"Subjective:     Patient ID: Kourtney Siddiqi is a 69 y.o. female.     Facial Numbness associated with dysphonia    Twice before had hoarsness with head cold, then resolved in several weeks April 2018 working in garden, developed a sinus infection and hoarsness.  The hoarseness has been present.  since apri 2018 Treated with antibiotics. Saw ENT Dr Merida. Allergic to much, treated with allergy shots for past year    Over this summer noted if talked a lot would develop dizziness and numbness. Tingling in left chest and left arm and up neck into jaw nose and mouth and forehead on left side. Numbness resolves if stops talking and relaxes the numbness resolves.   Had some type  injection on right side, of vocal cords?, after this procedure she has had very little numbness. Over all voice is a little better.better since the procedure,   Notes twitching in the left eye off and on.    Recent referred to dr Loi MD ENTshe noted \"significant glottic gap-occasional right vocal fold weakness, consider: spasmodic dysphonia, Parkinson's? Functional dysphonia\"  Consider emg laryngeal \"  Patient going to have speech therapy and a laryngeal EMG that will be set up by Dr. Monsivais  (had speech therapy at [place] last Feb , did not help)     Patient had a CT of throat and head was normal some mild Mild enlargement of right thyroid lobe.     Pt has has history of  aura of migraine with out headache onset about 42 yr old. . Has had twice this year, triggered by anxiety.    The following portions of the patient's history were reviewed and updated as appropriate: allergies, current medications, past family history, past medical history, past social history, past surgical history and problem list.      Family History   Problem Relation Age of Onset   • Hodgkin's lymphoma Mother    • Heart disease Father    • COPD Father    • Colon cancer Father    • Diabetes Sister    • Thyroid disease Sister    • Neuropathy Sister    • COPD Brother  "       Past Medical History:   Diagnosis Date   • Anxiety    • Environmental allergies    • Hypertension        Social History     Socioeconomic History   • Marital status:      Spouse name: Not on file   • Number of children: Not on file   • Years of education: Not on file   • Highest education level: Not on file   Tobacco Use   • Smoking status: Never Smoker   • Smokeless tobacco: Never Used   Substance and Sexual Activity   • Alcohol use: No     Frequency: Monthly or less   • Drug use: No   • Sexual activity: Defer         Current Outpatient Medications:   •  amLODIPine-benazepril (LOTREL 5-10) 5-10 MG per capsule, , Disp: , Rfl:   •  escitalopram (LEXAPRO) 10 MG tablet, , Disp: , Rfl:   •  levocetirizine (XYZAL) 5 MG tablet, , Disp: , Rfl:   •  LORazepam (ATIVAN) 1 MG tablet, TAKE 1 TO 2 TABLETS BY MOUTH EVERY NIGHT AT BEDTIME, Disp: 60 tablet, Rfl: 0  •  montelukast (SINGULAIR) 10 MG tablet, , Disp: , Rfl:   •  omeprazole (priLOSEC) 40 MG capsule, , Disp: , Rfl:     Review of Systems   Constitutional: Negative for appetite change and fatigue.   HENT: Positive for hearing loss, sinus pressure, sinus pain and voice change.    Eyes: Negative for pain and itching.   Respiratory: Negative for cough and shortness of breath.    Cardiovascular: Negative for chest pain and palpitations.   Gastrointestinal: Negative for constipation and diarrhea.   Endocrine: Negative for cold intolerance and heat intolerance.   Genitourinary: Negative for difficulty urinating and frequency.   Musculoskeletal: Positive for neck stiffness. Negative for back pain and neck pain.   Allergic/Immunologic: Negative for environmental allergies.   Neurological: Positive for light-headedness and numbness. Negative for dizziness, tremors, seizures, syncope, facial asymmetry, speech difficulty, weakness and headaches.   Psychiatric/Behavioral: Negative for agitation and confusion.        I have reviewed ROS completed by medical assistant.      Objective:    Neurologic Exam     Mental Status   Oriented to person, place, and time.     Cranial Nerves     CN III, IV, VI   Pupils are equal, round, and reactive to light.  Extraocular motions are normal.     Gait, Coordination, and Reflexes     Gait  Gait: normal    Coordination   Romberg: negative    Tremor   Resting tremor: absent  Intention tremor: absent  Action tremor: absentSlight diff with tandem walk       Physical Exam   Constitutional: She is oriented to person, place, and time. She appears well-developed and well-nourished.   HENT:   Nose: Nose normal.   Mouth/Throat: Oropharynx is clear and moist.   Eyes: EOM are normal. Pupils are equal, round, and reactive to light.   Cardiovascular: Normal rate, regular rhythm and normal heart sounds.   No murmur heard.  Pulmonary/Chest: Effort normal and breath sounds normal. No respiratory distress.   Musculoskeletal: Normal range of motion. She exhibits deformity. She exhibits no edema.   Neurological: She is alert and oriented to person, place, and time. She has a normal Romberg Test. Gait normal.   Psychiatric: She has a normal mood and affect. Her behavior is normal.   Vitals reviewed.      Assessment/Plan:    Kourtney was seen today for numbness.    Diagnoses and all orders for this visit:    Face numbness    ----transient numbness of left face arm and chest occurring with attempting to speak for a prolonged period despite the dysphonia    Dysphonia        --present for the past year, not improved with allergy treatment, further work up planned by ENT at the [facility]  -       Migraine aura without headache         ---- She has history of visual aura with out headache,  The spells of numbness in the face arm and chest on the left associated with prolonged periods of speaking may also be migraine equivalent spells triggered by anxiety associated with trying to speak.  Numbness on the left face and arm is the second most common migraine  neurologic symptom after visual aura.     Generalized anxiety disorder          1-Numbness, migraine aura hx, numbness might be due to migraine equivalent, given these focal neurologic symptoms will obtain mri of the brain  2- dysphonia fu with ENT. No indication of parkinson.  3. anxiety and depression, functional ? Etiology of the dysphonia and the associated transient numbness  4  Fu here PRN if symptoms of numbness continue, currently improved    This document has been electronically signed by Joseph Seipel, MD on November 5, 2019 4:33 AM

## 2019-11-12 ENCOUNTER — HOSPITAL ENCOUNTER (OUTPATIENT)
Dept: MRI IMAGING | Facility: HOSPITAL | Age: 69
Discharge: HOME | End: 2019-11-12
Admitting: PSYCHIATRY & NEUROLOGY

## 2019-11-12 DIAGNOSIS — G43.109 MIGRAINE AURA WITHOUT HEADACHE: ICD-10-CM

## 2019-11-12 DIAGNOSIS — R49.0 DYSPHONIA: ICD-10-CM

## 2019-11-12 LAB — CREAT BLDA-MCNC: 0.9 MG/DL (ref 0.6–1.3)

## 2019-11-12 PROCEDURE — 82565 ASSAY OF CREATININE: CPT

## 2019-11-12 PROCEDURE — A9576 INJ PROHANCE MULTIPACK: HCPCS | Performed by: PSYCHIATRY & NEUROLOGY

## 2019-11-12 PROCEDURE — 70553 MRI BRAIN STEM W/O & W/DYE: CPT

## 2019-11-12 PROCEDURE — 25010000002 GADOTERIDOL PER 1 ML: Performed by: PSYCHIATRY & NEUROLOGY

## 2019-11-12 RX ADMIN — GADOTERIDOL 19 ML: 279.3 INJECTION, SOLUTION INTRAVENOUS at 14:15

## 2019-11-21 RX ORDER — OMEPRAZOLE 40 MG/1
CAPSULE, DELAYED RELEASE ORAL
Qty: 30 CAPSULE | Refills: 0 | Status: SHIPPED | OUTPATIENT
Start: 2019-11-21 | End: 2019-12-23

## 2019-12-23 RX ORDER — OMEPRAZOLE 40 MG/1
CAPSULE, DELAYED RELEASE ORAL
Qty: 90 CAPSULE | Refills: 1 | Status: SHIPPED | OUTPATIENT
Start: 2019-12-23 | End: 2020-07-16 | Stop reason: SDUPTHER

## 2019-12-23 RX ORDER — OMEPRAZOLE 40 MG/1
CAPSULE, DELAYED RELEASE ORAL
Qty: 30 CAPSULE | Refills: 0 | Status: SHIPPED | OUTPATIENT
Start: 2019-12-23 | End: 2019-12-23

## 2019-12-23 RX ORDER — LORAZEPAM 1 MG/1
TABLET ORAL
Qty: 60 TABLET | Refills: 3 | Status: SHIPPED | OUTPATIENT
Start: 2019-12-23 | End: 2020-06-02 | Stop reason: SDUPTHER

## 2020-02-20 RX ORDER — MONTELUKAST SODIUM 10 MG/1
10 TABLET ORAL NIGHTLY
Qty: 30 TABLET | Refills: 3 | Status: SHIPPED | OUTPATIENT
Start: 2020-02-20 | End: 2020-06-22

## 2020-06-02 ENCOUNTER — TELEPHONE (OUTPATIENT)
Dept: FAMILY MEDICINE CLINIC | Facility: CLINIC | Age: 70
End: 2020-06-02

## 2020-06-02 DIAGNOSIS — F41.9 ANXIETY: Primary | ICD-10-CM

## 2020-06-02 RX ORDER — LORAZEPAM 1 MG/1
1-2 TABLET ORAL
Qty: 60 TABLET | Refills: 3 | Status: SHIPPED | OUTPATIENT
Start: 2020-06-02 | End: 2020-10-28

## 2020-06-02 NOTE — TELEPHONE ENCOUNTER
Patient is scheduled to see you on 11/3/20 for a Medicare Wellness Exam.  She will need meds filled until then.

## 2020-06-02 NOTE — TELEPHONE ENCOUNTER
Patient said she is due for her annual Medicare Wellness Exam and has scheduled that with you for 11/3, your soonest available.  Patient wants to know if you want to do MISC labwork now or wait until then?

## 2020-06-02 NOTE — TELEPHONE ENCOUNTER
Pt called and stated that the pharmacy would not refill the selected medication. Pt wants to know if she needs to be seen before getting a refill. Please advise.    Callback- [phone]

## 2020-06-22 RX ORDER — MONTELUKAST SODIUM 10 MG/1
10 TABLET ORAL NIGHTLY
Qty: 30 TABLET | Refills: 3 | Status: SHIPPED | OUTPATIENT
Start: 2020-06-22 | End: 2020-09-28

## 2020-06-24 RX ORDER — ESCITALOPRAM OXALATE 10 MG/1
TABLET ORAL
Qty: 90 TABLET | Refills: 0 | Status: SHIPPED | OUTPATIENT
Start: 2020-06-24 | End: 2020-09-14

## 2020-06-24 RX ORDER — AMLODIPINE BESYLATE AND BENAZEPRIL HYDROCHLORIDE 5; 10 MG/1; MG/1
CAPSULE ORAL
Qty: 90 CAPSULE | Refills: 0 | Status: SHIPPED | OUTPATIENT
Start: 2020-06-24 | End: 2020-09-14

## 2020-07-16 ENCOUNTER — TELEPHONE (OUTPATIENT)
Dept: FAMILY MEDICINE CLINIC | Facility: CLINIC | Age: 70
End: 2020-07-16

## 2020-07-16 ENCOUNTER — OFFICE VISIT (OUTPATIENT)
Dept: FAMILY MEDICINE CLINIC | Facility: CLINIC | Age: 70
End: 2020-07-16

## 2020-07-16 VITALS
DIASTOLIC BLOOD PRESSURE: 70 MMHG | HEIGHT: 66 IN | SYSTOLIC BLOOD PRESSURE: 108 MMHG | RESPIRATION RATE: 8 BRPM | BODY MASS INDEX: 34.72 KG/M2 | TEMPERATURE: 98 F | HEART RATE: 82 BPM | WEIGHT: 216 LBS | OXYGEN SATURATION: 98 %

## 2020-07-16 DIAGNOSIS — M15.8 OTHER OSTEOARTHRITIS INVOLVING MULTIPLE JOINTS: ICD-10-CM

## 2020-07-16 DIAGNOSIS — M79.671 FOOT PAIN, RIGHT: ICD-10-CM

## 2020-07-16 DIAGNOSIS — M79.671 FOOT PAIN, RIGHT: Primary | ICD-10-CM

## 2020-07-16 PROCEDURE — 99213 OFFICE O/P EST LOW 20 MIN: CPT | Performed by: NURSE PRACTITIONER

## 2020-07-16 RX ORDER — OMEPRAZOLE 40 MG/1
40 CAPSULE, DELAYED RELEASE ORAL DAILY
Qty: 90 CAPSULE | Refills: 1 | Status: SHIPPED | OUTPATIENT
Start: 2020-07-16 | End: 2020-12-14

## 2020-07-16 RX ORDER — MELOXICAM 15 MG/1
15 TABLET ORAL DAILY
Qty: 30 TABLET | Refills: 3 | Status: SHIPPED | OUTPATIENT
Start: 2020-07-16 | End: 2020-07-16

## 2020-07-16 RX ORDER — MELOXICAM 15 MG/1
15 TABLET ORAL DAILY
Qty: 90 TABLET | Refills: 1 | Status: SHIPPED | OUTPATIENT
Start: 2020-07-16 | End: 2020-12-14

## 2020-07-16 NOTE — TELEPHONE ENCOUNTER
----- Message from KARLEE Goss sent at 7/16/2020  3:49 PM EDT -----  Xray showed arthritis over the area she is c/o pain and swelling. Lets see how she responds to the mobic.

## 2020-07-16 NOTE — PROGRESS NOTES
"Subjective   Kourtney Siddiqi is a 70 y.o. female.     Chief Complaint   Patient presents with   • Pain     rt foot       /70   Pulse 82   Temp 98 °F (36.7 °C) (Temporal)   Resp 8   Ht 167.6 cm (66\")   Wt 98 kg (216 lb)   SpO2 98%   BMI 34.86 kg/m²     BP Readings from Last 3 Encounters:   07/16/20 108/70   11/04/19 118/80   08/09/19 120/80       Wt Readings from Last 3 Encounters:   07/16/20 98 kg (216 lb)   11/04/19 94.3 kg (208 lb)   08/23/19 96.1 kg (211 lb 12.8 oz)       Pt comes in today with c/o right foot pain. States last fall she was helping brother move some wood and he had thrown a log and it landed on the top of her foot. It had immediately swelled up and bruised. Had severe pain, but gradually improved over the next 2 months. Pain improved fairly quickly, but the swelling lasted almost 2 months.   Didn't have any issues until about 3 weeks ago. Now having pain again on top of foot where that original injury occurred and pain radiates up foot to ankle.   Rest and elevation seems to help. Walking makes it worse.   Hasn't taken anything otc for relief.  No ice.     Pt is also with c/o mult joint pain associated with arthritis. Has pain in hands, shoulders, ankles. Hasn't tried anything for relief.      The following portions of the patient's history were reviewed and updated as appropriate: allergies, current medications, past family history, past medical history, past social history, past surgical history and problem list.    Review of Systems   Musculoskeletal: Positive for arthralgias. Negative for gait problem.       Objective   Physical Exam   Constitutional: She is oriented to person, place, and time. She appears well-developed and well-nourished.   Eyes: Pupils are equal, round, and reactive to light.   Cardiovascular: Normal rate and regular rhythm.   Pulmonary/Chest: Effort normal and breath sounds normal.   Musculoskeletal:        Right foot: There is tenderness and swelling. There is " normal range of motion, no crepitus and no deformity.        Neurological: She is alert and oriented to person, place, and time.         Diagnoses and all orders for this visit:    1. Foot pain, right (Primary)  -     XR Foot 3+ View Right; Future  -     Discontinue: meloxicam (Mobic) 15 MG tablet; Take 1 tablet by mouth Daily.  Dispense: 30 tablet; Refill: 3    2. Other osteoarthritis involving multiple joints    Other orders  -     omeprazole (priLOSEC) 40 MG capsule; Take 1 capsule by mouth Daily.  Dispense: 90 capsule; Refill: 1    will get x-ray  Start mobic  During this office visit, we discussed the pertinent aspects of the visit and treatment recommendations. Pt verbalizes understanding. Follow up was discussed. Patient was given the opportunity to ask questions and discuss other concerns.       Return if symptoms worsen or fail to improve.

## 2020-07-16 NOTE — PROGRESS NOTES
Xray showed arthritis over the area she is c/o pain and swelling. Lets see how she responds to the mobic.

## 2020-09-14 RX ORDER — ESCITALOPRAM OXALATE 10 MG/1
TABLET ORAL
Qty: 90 TABLET | Refills: 0 | Status: SHIPPED | OUTPATIENT
Start: 2020-09-14 | End: 2020-11-18

## 2020-09-14 RX ORDER — AMLODIPINE BESYLATE AND BENAZEPRIL HYDROCHLORIDE 5; 10 MG/1; MG/1
CAPSULE ORAL
Qty: 90 CAPSULE | Refills: 0 | Status: SHIPPED | OUTPATIENT
Start: 2020-09-14 | End: 2020-12-06

## 2020-09-28 RX ORDER — MONTELUKAST SODIUM 10 MG/1
10 TABLET ORAL NIGHTLY
Qty: 90 TABLET | Refills: 0 | Status: SHIPPED | OUTPATIENT
Start: 2020-09-28 | End: 2020-12-14

## 2020-10-27 DIAGNOSIS — F41.9 ANXIETY: ICD-10-CM

## 2020-10-28 RX ORDER — LORAZEPAM 1 MG/1
TABLET ORAL
Qty: 60 TABLET | Refills: 0 | Status: SHIPPED | OUTPATIENT
Start: 2020-10-28 | End: 2020-12-14

## 2020-11-03 ENCOUNTER — OFFICE VISIT (OUTPATIENT)
Dept: FAMILY MEDICINE CLINIC | Facility: CLINIC | Age: 70
End: 2020-11-03

## 2020-11-03 VITALS
TEMPERATURE: 96.9 F | RESPIRATION RATE: 16 BRPM | BODY MASS INDEX: 37.28 KG/M2 | HEART RATE: 78 BPM | OXYGEN SATURATION: 94 % | WEIGHT: 232 LBS | SYSTOLIC BLOOD PRESSURE: 122 MMHG | HEIGHT: 66 IN | DIASTOLIC BLOOD PRESSURE: 80 MMHG

## 2020-11-03 DIAGNOSIS — K21.9 GASTROESOPHAGEAL REFLUX DISEASE, UNSPECIFIED WHETHER ESOPHAGITIS PRESENT: ICD-10-CM

## 2020-11-03 DIAGNOSIS — F41.1 GENERALIZED ANXIETY DISORDER: ICD-10-CM

## 2020-11-03 DIAGNOSIS — Z12.31 ENCOUNTER FOR SCREENING MAMMOGRAM FOR MALIGNANT NEOPLASM OF BREAST: ICD-10-CM

## 2020-11-03 DIAGNOSIS — E55.9 VITAMIN D DEFICIENCY, UNSPECIFIED: ICD-10-CM

## 2020-11-03 DIAGNOSIS — Z00.00 MEDICARE ANNUAL WELLNESS VISIT, SUBSEQUENT: Primary | ICD-10-CM

## 2020-11-03 DIAGNOSIS — J38.00 VOCAL CORD PARALYSIS: ICD-10-CM

## 2020-11-03 DIAGNOSIS — I10 ESSENTIAL HYPERTENSION: ICD-10-CM

## 2020-11-03 DIAGNOSIS — R93.7 ABNORMAL FINDINGS ON DIAGNOSTIC IMAGING OF OTHER PARTS OF MUSCULOSKELETAL SYSTEM: ICD-10-CM

## 2020-11-03 DIAGNOSIS — G47.00 INSOMNIA, UNSPECIFIED TYPE: ICD-10-CM

## 2020-11-03 PROCEDURE — 99213 OFFICE O/P EST LOW 20 MIN: CPT | Performed by: FAMILY MEDICINE

## 2020-11-03 PROCEDURE — G0439 PPPS, SUBSEQ VISIT: HCPCS | Performed by: FAMILY MEDICINE

## 2020-11-03 RX ORDER — MINOCYCLINE HYDROCHLORIDE 50 MG/1
CAPSULE ORAL
COMMUNITY
Start: 2020-10-27 | End: 2022-01-27

## 2020-11-03 RX ORDER — TRAZODONE HYDROCHLORIDE 100 MG/1
100 TABLET ORAL NIGHTLY
Qty: 90 TABLET | Refills: 1 | Status: SHIPPED | OUTPATIENT
Start: 2020-11-03 | End: 2021-05-12

## 2020-11-03 RX ORDER — TOBRAMYCIN AND DEXAMETHASONE 3; 1 MG/ML; MG/ML
SUSPENSION/ DROPS OPHTHALMIC
COMMUNITY
Start: 2020-10-27 | End: 2021-07-20

## 2020-11-03 RX ORDER — ASPIRIN 325 MG
325 TABLET ORAL DAILY
COMMUNITY

## 2020-11-04 ENCOUNTER — LAB (OUTPATIENT)
Dept: FAMILY MEDICINE CLINIC | Facility: CLINIC | Age: 70
End: 2020-11-04

## 2020-11-04 LAB
25(OH)D3 SERPL-MCNC: 46 NG/ML (ref 30–100)
ALBUMIN SERPL-MCNC: 4.3 G/DL (ref 3.5–5.2)
ALBUMIN/GLOB SERPL: 1.4 G/DL
ALP SERPL-CCNC: 112 U/L (ref 39–117)
ALT SERPL W P-5'-P-CCNC: 39 U/L (ref 1–33)
ANION GAP SERPL CALCULATED.3IONS-SCNC: 9.9 MMOL/L (ref 5–15)
AST SERPL-CCNC: 37 U/L (ref 1–32)
BASOPHILS # BLD AUTO: 0.05 10*3/MM3 (ref 0–0.2)
BASOPHILS NFR BLD AUTO: 0.6 % (ref 0–1.5)
BILIRUB SERPL-MCNC: 0.4 MG/DL (ref 0–1.2)
BUN SERPL-MCNC: 15 MG/DL (ref 8–23)
BUN/CREAT SERPL: 18.5 (ref 7–25)
CALCIUM SPEC-SCNC: 9.4 MG/DL (ref 8.6–10.5)
CHLORIDE SERPL-SCNC: 103 MMOL/L (ref 98–107)
CHOLEST SERPL-MCNC: 172 MG/DL (ref 0–200)
CO2 SERPL-SCNC: 29.1 MMOL/L (ref 22–29)
CREAT SERPL-MCNC: 0.81 MG/DL (ref 0.57–1)
DEPRECATED RDW RBC AUTO: 44.7 FL (ref 37–54)
EOSINOPHIL # BLD AUTO: 0.23 10*3/MM3 (ref 0–0.4)
EOSINOPHIL NFR BLD AUTO: 2.9 % (ref 0.3–6.2)
ERYTHROCYTE [DISTWIDTH] IN BLOOD BY AUTOMATED COUNT: 12.8 % (ref 12.3–15.4)
GFR SERPL CREATININE-BSD FRML MDRD: 70 ML/MIN/1.73
GLOBULIN UR ELPH-MCNC: 3 GM/DL
GLUCOSE SERPL-MCNC: 103 MG/DL (ref 65–99)
HBA1C MFR BLD: 6 % (ref 3.5–5.6)
HCT VFR BLD AUTO: 44.4 % (ref 34–46.6)
HDLC SERPL-MCNC: 55 MG/DL (ref 40–60)
HGB BLD-MCNC: 14.5 G/DL (ref 12–15.9)
IMM GRANULOCYTES # BLD AUTO: 0.03 10*3/MM3 (ref 0–0.05)
IMM GRANULOCYTES NFR BLD AUTO: 0.4 % (ref 0–0.5)
LDLC SERPL CALC-MCNC: 89 MG/DL (ref 0–100)
LDLC/HDLC SERPL: 1.54 {RATIO}
LYMPHOCYTES # BLD AUTO: 2.03 10*3/MM3 (ref 0.7–3.1)
LYMPHOCYTES NFR BLD AUTO: 25.8 % (ref 19.6–45.3)
MCH RBC QN AUTO: 30.7 PG (ref 26.6–33)
MCHC RBC AUTO-ENTMCNC: 32.7 G/DL (ref 31.5–35.7)
MCV RBC AUTO: 93.9 FL (ref 79–97)
MONOCYTES # BLD AUTO: 0.71 10*3/MM3 (ref 0.1–0.9)
MONOCYTES NFR BLD AUTO: 9 % (ref 5–12)
NEUTROPHILS NFR BLD AUTO: 4.83 10*3/MM3 (ref 1.7–7)
NEUTROPHILS NFR BLD AUTO: 61.3 % (ref 42.7–76)
NRBC BLD AUTO-RTO: 0 /100 WBC (ref 0–0.2)
PLATELET # BLD AUTO: 301 10*3/MM3 (ref 140–450)
PMV BLD AUTO: 9.1 FL (ref 6–12)
POTASSIUM SERPL-SCNC: 4.2 MMOL/L (ref 3.5–5.2)
PROT SERPL-MCNC: 7.3 G/DL (ref 6–8.5)
RBC # BLD AUTO: 4.73 10*6/MM3 (ref 3.77–5.28)
SODIUM SERPL-SCNC: 142 MMOL/L (ref 136–145)
T4 FREE SERPL-MCNC: 1.08 NG/DL (ref 0.93–1.7)
TRIGL SERPL-MCNC: 161 MG/DL (ref 0–150)
TSH SERPL DL<=0.05 MIU/L-ACNC: 0.64 UIU/ML (ref 0.27–4.2)
VIT B12 BLD-MCNC: 1208 PG/ML (ref 211–946)
VLDLC SERPL-MCNC: 28 MG/DL (ref 5–40)
WBC # BLD AUTO: 7.88 10*3/MM3 (ref 3.4–10.8)

## 2020-11-04 PROCEDURE — 83036 HEMOGLOBIN GLYCOSYLATED A1C: CPT | Performed by: FAMILY MEDICINE

## 2020-11-04 PROCEDURE — 36415 COLL VENOUS BLD VENIPUNCTURE: CPT | Performed by: FAMILY MEDICINE

## 2020-11-04 PROCEDURE — 80053 COMPREHEN METABOLIC PANEL: CPT | Performed by: FAMILY MEDICINE

## 2020-11-04 PROCEDURE — 82306 VITAMIN D 25 HYDROXY: CPT | Performed by: FAMILY MEDICINE

## 2020-11-04 PROCEDURE — 85025 COMPLETE CBC W/AUTO DIFF WBC: CPT | Performed by: FAMILY MEDICINE

## 2020-11-04 PROCEDURE — 82607 VITAMIN B-12: CPT | Performed by: FAMILY MEDICINE

## 2020-11-04 PROCEDURE — 84439 ASSAY OF FREE THYROXINE: CPT | Performed by: FAMILY MEDICINE

## 2020-11-04 PROCEDURE — 80061 LIPID PANEL: CPT | Performed by: FAMILY MEDICINE

## 2020-11-04 PROCEDURE — 84443 ASSAY THYROID STIM HORMONE: CPT | Performed by: FAMILY MEDICINE

## 2020-11-11 ENCOUNTER — TELEPHONE (OUTPATIENT)
Dept: FAMILY MEDICINE CLINIC | Facility: CLINIC | Age: 70
End: 2020-11-11

## 2020-11-11 NOTE — TELEPHONE ENCOUNTER
PATIENT CALLED STATED THAT SHE WAS TOLD HER LIVER ENZYMES WERE HIGH AND HAD QUESTIONS IN REGARDS TO THAT.    PLEASE ADVISE  [phone]

## 2020-11-17 ENCOUNTER — APPOINTMENT (OUTPATIENT)
Dept: BONE DENSITY | Facility: HOSPITAL | Age: 70
End: 2020-11-17

## 2020-11-17 ENCOUNTER — APPOINTMENT (OUTPATIENT)
Dept: MAMMOGRAPHY | Facility: HOSPITAL | Age: 70
End: 2020-11-17

## 2020-11-18 RX ORDER — ESCITALOPRAM OXALATE 10 MG/1
TABLET ORAL
Qty: 90 TABLET | Refills: 0 | Status: SHIPPED | OUTPATIENT
Start: 2020-11-18 | End: 2021-02-17

## 2020-11-19 ENCOUNTER — TRANSCRIBE ORDERS (OUTPATIENT)
Dept: ADMINISTRATIVE | Facility: HOSPITAL | Age: 70
End: 2020-11-19

## 2020-11-19 DIAGNOSIS — R13.10 DYSPHAGIA, UNSPECIFIED TYPE: Primary | ICD-10-CM

## 2020-11-23 ENCOUNTER — TELEPHONE (OUTPATIENT)
Dept: SPEECH THERAPY | Facility: HOSPITAL | Age: 70
End: 2020-11-23

## 2020-11-24 ENCOUNTER — TELEPHONE (OUTPATIENT)
Dept: SPEECH THERAPY | Facility: HOSPITAL | Age: 70
End: 2020-11-24

## 2020-11-25 ENCOUNTER — APPOINTMENT (OUTPATIENT)
Dept: GENERAL RADIOLOGY | Facility: HOSPITAL | Age: 70
End: 2020-11-25

## 2020-12-02 DIAGNOSIS — R13.10 DYSPHAGIA, UNSPECIFIED TYPE: Primary | ICD-10-CM

## 2020-12-06 RX ORDER — AMLODIPINE BESYLATE AND BENAZEPRIL HYDROCHLORIDE 5; 10 MG/1; MG/1
CAPSULE ORAL
Qty: 90 CAPSULE | Refills: 1 | Status: SHIPPED | OUTPATIENT
Start: 2020-12-06 | End: 2021-06-04

## 2020-12-08 ENCOUNTER — LAB (OUTPATIENT)
Dept: LAB | Facility: HOSPITAL | Age: 70
End: 2020-12-08

## 2020-12-08 DIAGNOSIS — R13.10 DYSPHAGIA, UNSPECIFIED TYPE: ICD-10-CM

## 2020-12-08 PROCEDURE — U0004 COV-19 TEST NON-CDC HGH THRU: HCPCS

## 2020-12-08 PROCEDURE — C9803 HOPD COVID-19 SPEC COLLECT: HCPCS

## 2020-12-09 LAB — SARS-COV-2 RNA RESP QL NAA+PROBE: NOT DETECTED

## 2020-12-10 ENCOUNTER — HOSPITAL ENCOUNTER (OUTPATIENT)
Dept: GENERAL RADIOLOGY | Facility: HOSPITAL | Age: 70
Discharge: HOME | End: 2020-12-10

## 2020-12-10 ENCOUNTER — HOSPITAL ENCOUNTER (OUTPATIENT)
Dept: MAMMOGRAPHY | Facility: HOSPITAL | Age: 70
Discharge: HOME | End: 2020-12-10

## 2020-12-10 ENCOUNTER — HOSPITAL ENCOUNTER (OUTPATIENT)
Dept: BONE DENSITY | Facility: HOSPITAL | Age: 70
End: 2020-12-10

## 2020-12-10 DIAGNOSIS — Z12.31 ENCOUNTER FOR SCREENING MAMMOGRAM FOR MALIGNANT NEOPLASM OF BREAST: ICD-10-CM

## 2020-12-10 DIAGNOSIS — R13.10 DYSPHAGIA, UNSPECIFIED TYPE: ICD-10-CM

## 2020-12-10 DIAGNOSIS — Z00.00 MEDICARE ANNUAL WELLNESS VISIT, SUBSEQUENT: ICD-10-CM

## 2020-12-10 PROCEDURE — 92611 MOTION FLUOROSCOPY/SWALLOW: CPT

## 2020-12-10 PROCEDURE — 77063 BREAST TOMOSYNTHESIS BI: CPT

## 2020-12-10 PROCEDURE — 77067 SCR MAMMO BI INCL CAD: CPT

## 2020-12-10 PROCEDURE — 74230 X-RAY XM SWLNG FUNCJ C+: CPT

## 2020-12-10 PROCEDURE — A9270 NON-COVERED ITEM OR SERVICE: HCPCS | Performed by: OTOLARYNGOLOGY

## 2020-12-10 PROCEDURE — 63710000001 BARIUM SULFATE 40 % SUSPENSION: Performed by: OTOLARYNGOLOGY

## 2020-12-10 RX ADMIN — BARIUM SULFATE 50 ML: 400 SUSPENSION ORAL at 10:00

## 2020-12-10 NOTE — MBS/VFSS/FEES
Outpatient Speech Language Pathology   Adult Swallow Initial Evaluation   [facility]     Patient Name: Kourtney Siddiqi  : 1950  MRN: [id number]  Today's Date: 12/10/2020         Visit Date: 12/10/2020   Patient Active Problem List   Diagnosis   • Colon polyps   • Depression   • Family history of malignant neoplasm of colon   • Gastroesophageal reflux disease   • Generalized anxiety disorder   • Hyperlipidemia   • Hypertension   • Osteoarthritis   • Dysphonia   • Migraine aura without headache   • Vocal cord paralysis        Past Medical History:   Diagnosis Date   • Anxiety    • Environmental allergies    • Hypertension    • Vocal cord paralysis     follows w/ ENT- [facility]        Past Surgical History:   Procedure Laterality Date   • BLADDER REPAIR     • GALLBLADDER SURGERY     • OTHER SURGICAL HISTORY      had abox implaned to help stimulate the vocal cord surgery   • SUBTOTAL HYSTERECTOMY     • VOCAL CORD INJECTION           Visit Dx:     ICD-10-CM ICD-9-CM   1. Dysphagia, unspecified type  R13.10 787.20           SLP Adult Swallow Evaluation     Row Name 12/10/20 1100       Rehab Evaluation    Document Type  evaluation  -MM    Subjective Information  no complaints  -MM    Patient Observations  alert;cooperative;agree to therapy  -MM    Patient/Family/Caregiver Comments/Observations  Patient exhibits hoarse vocal quality which does not improve with different head positions  -MM    Care Plan Review  evaluation/treatment results reviewed  -MM    Patient Effort  good  -MM       General Information    Patient Profile Reviewed  yes  -MM    Pertinent History Of Current Problem  Patient presents today for VFSS to assess complaints of choking when eating and drinking.  She reports more difficulties in the evening.  Patient history of vocal cord paralysis with botox injections that were not successful and surgeyr on the right side 3 years ago.  Patient reports she will be having surgery on the left side in .   Patient states that as the day progressess she will get numbess and tingling sensation up her neck and arm.  Patient reports she will sometimes use compensations such as a head turn to the left when she notices that she is getting choked.    -MM    Current Method of Nutrition  regular textures;thin liquids  -MM    Prior Level of Function-Communication  WFL  -MM    Prior Level of Function-Swallowing  no diet consistency restrictions  -MM    Plans/Goals Discussed with  patient  -MM    Barriers to Rehab  none identified  -MM       Oral Motor Structure and Function    Dentition Assessment  natural, present and adequate  -MM    Secretion Management  WNL/WFL  -MM    Volitional Swallow  WFL  -MM    Volitional Cough  WFL  -MM       Oral Musculature and Cranial Nerve Assessment    Oral Motor General Assessment  WFL  -MM       General Eating/Swallowing Observations    Respiratory Support Currently in Use  room air  -MM    Eating/Swallowing Skills  self-fed  -MM    Positioning During Eating  -- standing lateral view  -MM       MBS/VFSS    Utensils Used  spoon;cup  -MM    Consistencies Trialed  regular textures;chopped;pureed;thin liquids  -MM       MBS/VFSS Interpretation    Oral Prep Phase  WFL  -MM    Oral Transit Phase  WFL  -MM    Oral Residue  WFL  -MM    VFSS Summary  THIN:  Patient given 3 trials of single sips of thin barium by cup and 1 trial of consecutive sips by cup.  Across all trials, patient exhibits timely swallow initiation with no penetration or aspiration.  Mild residue remains in vallecuale and trace in pyriform sinus.    PUREE:  Patient given two trials of applesauce.  Patient forms cohesive bolus with timely swallow initiation.  No penetration or aspiration appreciated.  Mild residue remains in valleculae.    PEACHES:  Functional mastication noted with spillage of juice reaching the pyriform sinus.  No penetraiton or aspiration noted.  Patient does exhibit mild residue in vallecule and underside of  epiglottis.    CRACKER:  Functional mastication with formation of cohesive bolus.  Small amount spills to pyriform sinus however it appears to be more of a liquid consistency than solid form.  During the swallow, this pre-spilled material enters laryngeal vestibule resulting in deep penetration during which doesn't fully clear.    OVERALL:  Patient has significantly reduced laryngeal elevation and hyoid excursion.  Laryngeal vestibule closure is slightly reduced.  -MM       Initiation of Pharyngeal Swallow    Initiation of Pharyngeal Swallow  WFL  -MM    Pharyngeal Phase  impaired pharyngeal phase of swallowing  -MM    Penetration During the Swallow  regular textures;secondary to reduced vestibular closure  -MM    Pharyngeal Phase, Comment  Only one instance of penetration occurs and this is during the cracker trial due to pre-spilled material reaching the pyrifrom sinus which enters the laryngeal vestibule during the swallow  -MM       Clinical Impression    SLP Swallowing Diagnosis  functional oral phase;mild;pharyngeal dysphagia  -MM    Functional Impact  no impact on function  -MM       Recommendations    Therapy Frequency (Swallow)  evaluation only  -MM    SLP Diet Recommendation  regular textures;thin liquids  -MM    Recommended Precautions and Strategies  upright posture during/after eating;small bites of food and sips of liquid;multiple swallows per bite of food;multiple swallows per sip of liquid;volitional throat clear;general aspiration precautions  -MM    Oral Care Recommendations  Oral Care BID/PRN  -MM    SLP Rec. for Method of Medication Administration  meds whole;with thin liquids;as tolerated  -MM      User Key  (r) = Recorded By, (t) = Taken By, (c) = Cosigned By    Initials Name Provider Type    Fina Myers SLP Speech and Language Pathologist                                              Time Calculation:                     EMELYN Bolanos  12/10/2020

## 2020-12-11 DIAGNOSIS — F41.9 ANXIETY: ICD-10-CM

## 2020-12-14 DIAGNOSIS — M79.671 FOOT PAIN, RIGHT: ICD-10-CM

## 2020-12-14 RX ORDER — LORAZEPAM 1 MG/1
TABLET ORAL
Qty: 60 TABLET | Refills: 1 | Status: SHIPPED | OUTPATIENT
Start: 2020-12-14 | End: 2021-02-15

## 2020-12-14 RX ORDER — MONTELUKAST SODIUM 10 MG/1
10 TABLET ORAL NIGHTLY
Qty: 90 TABLET | Refills: 1 | Status: SHIPPED | OUTPATIENT
Start: 2020-12-14 | End: 2021-05-12

## 2020-12-14 RX ORDER — OMEPRAZOLE 40 MG/1
40 CAPSULE, DELAYED RELEASE ORAL DAILY
Qty: 90 CAPSULE | Refills: 1 | Status: SHIPPED | OUTPATIENT
Start: 2020-12-14 | End: 2021-05-12

## 2020-12-14 RX ORDER — MELOXICAM 15 MG/1
15 TABLET ORAL DAILY
Qty: 90 TABLET | Refills: 1 | Status: SHIPPED | OUTPATIENT
Start: 2020-12-14 | End: 2021-05-12

## 2020-12-18 ENCOUNTER — TELEPHONE (OUTPATIENT)
Dept: FAMILY MEDICINE CLINIC | Facility: CLINIC | Age: 70
End: 2020-12-18

## 2020-12-18 RX ORDER — SULFAMETHOXAZOLE AND TRIMETHOPRIM 800; 160 MG/1; MG/1
1 TABLET ORAL 2 TIMES DAILY
Qty: 10 TABLET | Refills: 0 | Status: SHIPPED | OUTPATIENT
Start: 2020-12-18 | End: 2020-12-23

## 2020-12-18 NOTE — TELEPHONE ENCOUNTER
PT IS CALLING IN STATING THAT SHE IS EXPERIENCING PAIN DURING URINATION, FREQUENT URINATION AND THINKS SHE MAY HAVE A UTI.  PT WANTS TO KNOW IF A ANTIBIOTIC CAN BE CALLED IN FOR HER      PT CALL BACK  [phone]  PHARMACY  [address]

## 2021-01-22 ENCOUNTER — TELEPHONE (OUTPATIENT)
Dept: FAMILY MEDICINE CLINIC | Facility: CLINIC | Age: 71
End: 2021-01-22

## 2021-01-22 ENCOUNTER — APPOINTMENT (OUTPATIENT)
Dept: GENERAL RADIOLOGY | Facility: HOSPITAL | Age: 71
End: 2021-01-22

## 2021-01-22 ENCOUNTER — HOSPITAL ENCOUNTER (EMERGENCY)
Facility: HOSPITAL | Age: 71
Discharge: HOME | End: 2021-01-22
Attending: EMERGENCY MEDICINE | Admitting: EMERGENCY MEDICINE

## 2021-01-22 VITALS
DIASTOLIC BLOOD PRESSURE: 83 MMHG | OXYGEN SATURATION: 96 % | RESPIRATION RATE: 18 BRPM | HEART RATE: 64 BPM | SYSTOLIC BLOOD PRESSURE: 132 MMHG | TEMPERATURE: 98.6 F | HEIGHT: 66 IN | WEIGHT: 218.92 LBS | BODY MASS INDEX: 35.18 KG/M2

## 2021-01-22 DIAGNOSIS — U07.1 COVID-19: Primary | ICD-10-CM

## 2021-01-22 LAB
ANION GAP SERPL CALCULATED.3IONS-SCNC: 13 MMOL/L (ref 5–15)
BASOPHILS # BLD AUTO: 0 10*3/MM3 (ref 0–0.2)
BASOPHILS NFR BLD AUTO: 0.6 % (ref 0–1.5)
BUN SERPL-MCNC: 11 MG/DL (ref 8–23)
BUN/CREAT SERPL: 13.3 (ref 7–25)
CALCIUM SPEC-SCNC: 8.9 MG/DL (ref 8.6–10.5)
CHLORIDE SERPL-SCNC: 103 MMOL/L (ref 98–107)
CO2 SERPL-SCNC: 25 MMOL/L (ref 22–29)
CREAT SERPL-MCNC: 0.83 MG/DL (ref 0.57–1)
DEPRECATED RDW RBC AUTO: 44.6 FL (ref 37–54)
EOSINOPHIL # BLD AUTO: 0.1 10*3/MM3 (ref 0–0.4)
EOSINOPHIL NFR BLD AUTO: 1.3 % (ref 0.3–6.2)
ERYTHROCYTE [DISTWIDTH] IN BLOOD BY AUTOMATED COUNT: 14 % (ref 12.3–15.4)
GFR SERPL CREATININE-BSD FRML MDRD: 68 ML/MIN/1.73
GLUCOSE SERPL-MCNC: 135 MG/DL (ref 65–99)
HCT VFR BLD AUTO: 41.7 % (ref 34–46.6)
HGB BLD-MCNC: 14.1 G/DL (ref 12–15.9)
LYMPHOCYTES # BLD AUTO: 1.3 10*3/MM3 (ref 0.7–3.1)
LYMPHOCYTES NFR BLD AUTO: 27.9 % (ref 19.6–45.3)
MCH RBC QN AUTO: 30.9 PG (ref 26.6–33)
MCHC RBC AUTO-ENTMCNC: 33.8 G/DL (ref 31.5–35.7)
MCV RBC AUTO: 91.2 FL (ref 79–97)
MONOCYTES # BLD AUTO: 0.4 10*3/MM3 (ref 0.1–0.9)
MONOCYTES NFR BLD AUTO: 9.6 % (ref 5–12)
NEUTROPHILS NFR BLD AUTO: 2.7 10*3/MM3 (ref 1.7–7)
NEUTROPHILS NFR BLD AUTO: 60.6 % (ref 42.7–76)
NRBC BLD AUTO-RTO: 0.2 /100 WBC (ref 0–0.2)
PLATELET # BLD AUTO: 223 10*3/MM3 (ref 140–450)
PMV BLD AUTO: 6.3 FL (ref 6–12)
POTASSIUM SERPL-SCNC: 3.5 MMOL/L (ref 3.5–5.2)
RBC # BLD AUTO: 4.56 10*6/MM3 (ref 3.77–5.28)
SODIUM SERPL-SCNC: 141 MMOL/L (ref 136–145)
WBC # BLD AUTO: 4.5 10*3/MM3 (ref 3.4–10.8)

## 2021-01-22 PROCEDURE — 71045 X-RAY EXAM CHEST 1 VIEW: CPT

## 2021-01-22 PROCEDURE — 93005 ELECTROCARDIOGRAM TRACING: CPT | Performed by: EMERGENCY MEDICINE

## 2021-01-22 PROCEDURE — 80048 BASIC METABOLIC PNL TOTAL CA: CPT | Performed by: EMERGENCY MEDICINE

## 2021-01-22 PROCEDURE — 25010000006 BAMLANIVIMAB 700 MG/20ML SOLUTION 20 ML VIAL: Performed by: EMERGENCY MEDICINE

## 2021-01-22 PROCEDURE — 85025 COMPLETE CBC W/AUTO DIFF WBC: CPT | Performed by: EMERGENCY MEDICINE

## 2021-01-22 PROCEDURE — 99283 EMERGENCY DEPT VISIT LOW MDM: CPT

## 2021-01-22 PROCEDURE — M0239 BAMLANIVIMAB-XXXX INFUSION: HCPCS | Performed by: EMERGENCY MEDICINE

## 2021-01-22 RX ORDER — METHYLPREDNISOLONE SODIUM SUCCINATE 125 MG/2ML
125 INJECTION, POWDER, LYOPHILIZED, FOR SOLUTION INTRAMUSCULAR; INTRAVENOUS ONCE AS NEEDED
Status: DISCONTINUED | OUTPATIENT
Start: 2021-01-22 | End: 2021-01-23 | Stop reason: HOSPADM

## 2021-01-22 RX ORDER — EPINEPHRINE 1 MG/ML
0.3 INJECTION, SOLUTION, CONCENTRATE INTRAVENOUS ONCE AS NEEDED
Status: DISCONTINUED | OUTPATIENT
Start: 2021-01-22 | End: 2021-01-23 | Stop reason: HOSPADM

## 2021-01-22 RX ORDER — SODIUM CHLORIDE 0.9 % (FLUSH) 0.9 %
10 SYRINGE (ML) INJECTION AS NEEDED
Status: DISCONTINUED | OUTPATIENT
Start: 2021-01-22 | End: 2021-01-23 | Stop reason: HOSPADM

## 2021-01-22 RX ORDER — SODIUM CHLORIDE 9 MG/ML
30 INJECTION, SOLUTION INTRAVENOUS ONCE
Status: COMPLETED | OUTPATIENT
Start: 2021-01-22 | End: 2021-01-22

## 2021-01-22 RX ORDER — DIPHENHYDRAMINE HYDROCHLORIDE 50 MG/ML
50 INJECTION INTRAMUSCULAR; INTRAVENOUS ONCE AS NEEDED
Status: DISCONTINUED | OUTPATIENT
Start: 2021-01-22 | End: 2021-01-23 | Stop reason: HOSPADM

## 2021-01-22 RX ADMIN — SODIUM CHLORIDE 700 MG: 9 INJECTION, SOLUTION INTRAVENOUS at 19:56

## 2021-01-22 RX ADMIN — SODIUM CHLORIDE 30 ML: 9 INJECTION, SOLUTION INTRAVENOUS at 21:01

## 2021-01-22 NOTE — TELEPHONE ENCOUNTER
Caller: Kourtney Siddiqi    Relationship to patient: Self    Best call back number: [phone]    Patient is needing: PATIENT SAID THAT SHE WANTED TO LET DR. CARNES KNOW THAT SHE TESTED POSITIVE FOR COVID 19 AND IS CURRENTLY AT [facility] WITH UNDERLYING CONDITIONS.

## 2021-01-22 NOTE — ED PROVIDER NOTES
Subjective   Chief complaint: Shortness of breath    70-year-old female presents with shortness of breath.  Patient states she has had cough, congestion, sinus pain, fever that started on Sunday.  She states yesterday she started having some shortness of breath as well.  Cough is nonproductive.  She went to an urgent care center today and tested positive for Covid.  They gave her a prescription for Augmentin for sinus infection as well.  Since she was having some shortness of breath they recommended she come to the emergency room.  She denies any chest pain.  She denies any alleviating or exacerbating factors.      History provided by:  Patient      Review of Systems   Constitutional: Positive for fever.   HENT: Positive for congestion.    Eyes: Negative for redness.   Respiratory: Positive for cough and shortness of breath.    Cardiovascular: Negative for chest pain.   Gastrointestinal: Positive for nausea. Negative for abdominal pain, diarrhea and vomiting.   Genitourinary: Negative for dysuria.   Musculoskeletal: Negative for back pain.   Skin: Negative for rash.   Neurological: Positive for headaches. Negative for dizziness.   Psychiatric/Behavioral: Negative for confusion.       Past Medical History:   Diagnosis Date   • Anxiety    • Environmental allergies    • Hypertension    • Vocal cord paralysis     follows w/ ENT- [facility]       Allergies   Allergen Reactions   • Hydrocodone Mental Status Change       Past Surgical History:   Procedure Laterality Date   • BLADDER REPAIR     • GALLBLADDER SURGERY     • OTHER SURGICAL HISTORY      had abox implaned to help stimulate the vocal cord surgery   • SUBTOTAL HYSTERECTOMY     • VOCAL CORD INJECTION         Family History   Problem Relation Age of Onset   • Hodgkin's lymphoma Mother    • Heart disease Father    • COPD Father    • Colon cancer Father    • Diabetes Sister    • Thyroid disease Sister    • Neuropathy Sister    • COPD Brother        Social History  "    Socioeconomic History   • Marital status:      Spouse name: Not on file   • Number of children: Not on file   • Years of education: Not on file   • Highest education level: Not on file   Tobacco Use   • Smoking status: Never Smoker   • Smokeless tobacco: Never Used   Substance and Sexual Activity   • Alcohol use: No     Frequency: Monthly or less   • Drug use: No   • Sexual activity: Not Currently       /85 (Patient Position: Sitting)   Pulse 81   Temp 98 °F (36.7 °C) (Oral)   Resp 20   Ht 167.6 cm (66\")   Wt 99.3 kg (218 lb 14.7 oz)   SpO2 96%   BMI 35.33 kg/m²       Objective   Physical Exam  Vitals signs and nursing note reviewed.   Constitutional:       Appearance: She is well-developed.   HENT:      Head: Normocephalic and atraumatic.   Eyes:      Extraocular Movements: Extraocular movements intact.      Pupils: Pupils are equal, round, and reactive to light.   Neck:      Musculoskeletal: Normal range of motion and neck supple.   Cardiovascular:      Rate and Rhythm: Normal rate and regular rhythm.      Heart sounds: Normal heart sounds.   Pulmonary:      Effort: Pulmonary effort is normal. No respiratory distress.      Breath sounds: Normal breath sounds.   Abdominal:      General: Bowel sounds are normal.      Palpations: Abdomen is soft.      Tenderness: There is no abdominal tenderness.   Musculoskeletal: Normal range of motion.   Skin:     General: Skin is warm and dry.   Neurological:      General: No focal deficit present.      Mental Status: She is alert and oriented to person, place, and time.         Procedures           ED Course      Results for orders placed or performed during the hospital encounter of 01/22/21   Basic Metabolic Panel    Specimen: Blood   Result Value Ref Range    Glucose 135 (H) 65 - 99 mg/dL    BUN 11 8 - 23 mg/dL    Creatinine 0.83 0.57 - 1.00 mg/dL    Sodium 141 136 - 145 mmol/L    Potassium 3.5 3.5 - 5.2 mmol/L    Chloride 103 98 - 107 mmol/L    CO2 " 25.0 22.0 - 29.0 mmol/L    Calcium 8.9 8.6 - 10.5 mg/dL    eGFR Non African Amer 68 >60 mL/min/1.73    BUN/Creatinine Ratio 13.3 7.0 - 25.0    Anion Gap 13.0 5.0 - 15.0 mmol/L   CBC Auto Differential    Specimen: Blood   Result Value Ref Range    WBC 4.50 3.40 - 10.80 10*3/mm3    RBC 4.56 3.77 - 5.28 10*6/mm3    Hemoglobin 14.1 12.0 - 15.9 g/dL    Hematocrit 41.7 34.0 - 46.6 %    MCV 91.2 79.0 - 97.0 fL    MCH 30.9 26.6 - 33.0 pg    MCHC 33.8 31.5 - 35.7 g/dL    RDW 14.0 12.3 - 15.4 %    RDW-SD 44.6 37.0 - 54.0 fl    MPV 6.3 6.0 - 12.0 fL    Platelets 223 140 - 450 10*3/mm3    Neutrophil % 60.6 42.7 - 76.0 %    Lymphocyte % 27.9 19.6 - 45.3 %    Monocyte % 9.6 5.0 - 12.0 %    Eosinophil % 1.3 0.3 - 6.2 %    Basophil % 0.6 0.0 - 1.5 %    Neutrophils, Absolute 2.70 1.70 - 7.00 10*3/mm3    Lymphocytes, Absolute 1.30 0.70 - 3.10 10*3/mm3    Monocytes, Absolute 0.40 0.10 - 0.90 10*3/mm3    Eosinophils, Absolute 0.10 0.00 - 0.40 10*3/mm3    Basophils, Absolute 0.00 0.00 - 0.20 10*3/mm3    nRBC 0.2 0.0 - 0.2 /100 WBC     Xr Chest 1 View    Result Date: 1/22/2021  1. No acute cardiopulmonary abnormality.  Electronically Signed By-Fletcher Dudley MD On:1/22/2021 6:25 PM This report was finalized on [id number] by  Fletcher Dudley MD.                                         MDM   The FDA has authorized the emergency use of bamlanivimab, which is not an FDA approved drug. Discussions with the patient regarding the risks and benefits of bamlanivimab have occurred. The patient recognizes that this is an investigational treatment which may offer significant known and potential benefits and risks, the extent of which are unknown. Information on available alternative treatments and the risks and benefits of those alternatives was discussed. The patient received the “Fact Sheet for Patients Parents and Caregivers”. All questions from the patient were answered to satisfaction. The patient has the option to accept or refuse  treatment with bamlanivimab and would like to accept treatment.    Counseling regarding continued self isolation and use of infection control measures according to the CDC guidelines has occurred.        Patient had the above evaluation.  Results were discussed with the patient.  Chest x-ray shows no pneumonia.  Labs are unremarkable.  Patient is oxygenating well on room air.  Patient is a candidate for bamlanivimab and she is agreeable to receive the infusion.  She will be discharged when this is complete.      Final diagnoses:   COVID-19            Zia Holcomb MD  01/22/21 5177

## 2021-01-23 NOTE — NURSING NOTE
The FDA has issued an Emergency Use Authorization (EUA) to permit emergency use of the unapproved product bamlanivimab for treatment of laboratory confirmed COVID-19 in certain ambulatory patients. There is no prescribing information or package insert, however, the FDA has authorized distribution of Fact Sheets for patients and/or caregivers for additional information on this investigational therapy. I have provided the Fact Sheet to and discussed the following with the patient and she agreed to proceed:  • FDA has authorized the emergency use (EUA) of bamlanivimab, which is not an FDA approved drug.  • The patient has the option to accept or refuse bamlanivimab at any time.  • The significant known and potential risks and benefits of bamlanivimab and the extent to which such risks and benefits are unknown.  • Information on available alternative treatments and the risks and benefits of those alternatives have been shared.

## 2021-02-01 ENCOUNTER — TELEPHONE (OUTPATIENT)
Dept: FAMILY MEDICINE CLINIC | Facility: CLINIC | Age: 71
End: 2021-02-01

## 2021-02-01 NOTE — TELEPHONE ENCOUNTER
PT WAS DIAGNOSED WITH COVID ON 1-. OLAMIDE HAD AN INFUSION ON 1/22/2021.    OLAMIDE WOULD LIKE A CALL TO DISCUSS WHEN IT WOULD BE SAFE FOR HER TO GET A COVID VACCINATION.    PT CONTACT:adilia  [phone]

## 2021-02-14 DIAGNOSIS — F41.9 ANXIETY: ICD-10-CM

## 2021-02-15 RX ORDER — LORAZEPAM 1 MG/1
1 TABLET ORAL
Qty: 60 TABLET | Refills: 1 | Status: SHIPPED | OUTPATIENT
Start: 2021-02-15 | End: 2021-04-15 | Stop reason: SDUPTHER

## 2021-02-17 RX ORDER — ESCITALOPRAM OXALATE 10 MG/1
TABLET ORAL
Qty: 90 TABLET | Refills: 1 | Status: SHIPPED | OUTPATIENT
Start: 2021-02-17 | End: 2021-09-21

## 2021-04-15 DIAGNOSIS — F41.9 ANXIETY: ICD-10-CM

## 2021-04-15 NOTE — TELEPHONE ENCOUNTER
Caller: Kourtney Siddiqi    Relationship: Self    Best call back number [phone]     Medication needed:   Requested Prescriptions     Pending Prescriptions Disp Refills   • LORazepam (ATIVAN) 1 MG tablet 60 tablet 1     Sig: Take 1 tablet by mouth every night at bedtime.       When do you need the refill by: 4/15/21    What additional details did the patient provide when requesting the medication: STATED THAT SHE NEEDS THIS FILLED, BUT WITH THE INSTRUCTIONS FOR 1-2 PILLS NIGHTLY , AS BEFORE     Does the patient have less than a 3 day supply:  [x] Yes  [] No    What is the patient's preferred pharmacy: [id number] - [facility] [place] [address] AT [facility] OF  [address] [facility] - [phone] [place] [phone] FX

## 2021-04-17 RX ORDER — LORAZEPAM 1 MG/1
1 TABLET ORAL
Qty: 60 TABLET | Refills: 1 | Status: SHIPPED | OUTPATIENT
Start: 2021-04-17 | End: 2021-07-20 | Stop reason: SDUPTHER

## 2021-05-12 DIAGNOSIS — M79.671 FOOT PAIN, RIGHT: ICD-10-CM

## 2021-05-12 DIAGNOSIS — F41.1 GENERALIZED ANXIETY DISORDER: ICD-10-CM

## 2021-05-12 DIAGNOSIS — G47.00 INSOMNIA, UNSPECIFIED TYPE: ICD-10-CM

## 2021-05-12 RX ORDER — TRAZODONE HYDROCHLORIDE 100 MG/1
TABLET ORAL
Qty: 90 TABLET | Refills: 1 | Status: SHIPPED | OUTPATIENT
Start: 2021-05-12 | End: 2021-10-28

## 2021-05-12 RX ORDER — MONTELUKAST SODIUM 10 MG/1
10 TABLET ORAL NIGHTLY
Qty: 90 TABLET | Refills: 1 | Status: SHIPPED | OUTPATIENT
Start: 2021-05-12 | End: 2021-10-28

## 2021-05-12 RX ORDER — MELOXICAM 15 MG/1
15 TABLET ORAL DAILY
Qty: 90 TABLET | Refills: 1 | Status: SHIPPED | OUTPATIENT
Start: 2021-05-12 | End: 2021-10-28

## 2021-05-12 RX ORDER — OMEPRAZOLE 40 MG/1
40 CAPSULE, DELAYED RELEASE ORAL DAILY
Qty: 90 CAPSULE | Refills: 1 | Status: SHIPPED | OUTPATIENT
Start: 2021-05-12 | End: 2021-10-28

## 2021-06-04 RX ORDER — AMLODIPINE BESYLATE AND BENAZEPRIL HYDROCHLORIDE 5; 10 MG/1; MG/1
CAPSULE ORAL
Qty: 90 CAPSULE | Refills: 1 | Status: SHIPPED | OUTPATIENT
Start: 2021-06-04 | End: 2021-10-28

## 2021-07-20 ENCOUNTER — OFFICE VISIT (OUTPATIENT)
Dept: FAMILY MEDICINE CLINIC | Facility: CLINIC | Age: 71
End: 2021-07-20

## 2021-07-20 ENCOUNTER — HOSPITAL ENCOUNTER (OUTPATIENT)
Dept: GENERAL RADIOLOGY | Facility: HOSPITAL | Age: 71
Discharge: HOME | End: 2021-07-20
Admitting: FAMILY MEDICINE

## 2021-07-20 ENCOUNTER — LAB (OUTPATIENT)
Dept: FAMILY MEDICINE CLINIC | Facility: CLINIC | Age: 71
End: 2021-07-20

## 2021-07-20 VITALS
TEMPERATURE: 98 F | HEART RATE: 68 BPM | DIASTOLIC BLOOD PRESSURE: 78 MMHG | RESPIRATION RATE: 16 BRPM | OXYGEN SATURATION: 94 % | WEIGHT: 219 LBS | SYSTOLIC BLOOD PRESSURE: 122 MMHG | BODY MASS INDEX: 35.2 KG/M2 | HEIGHT: 66 IN

## 2021-07-20 DIAGNOSIS — F51.01 PRIMARY INSOMNIA: Primary | ICD-10-CM

## 2021-07-20 DIAGNOSIS — M25.561 ACUTE PAIN OF RIGHT KNEE: ICD-10-CM

## 2021-07-20 DIAGNOSIS — F41.9 ANXIETY: ICD-10-CM

## 2021-07-20 DIAGNOSIS — Z00.00 PREVENTATIVE HEALTH CARE: ICD-10-CM

## 2021-07-20 DIAGNOSIS — I10 ESSENTIAL HYPERTENSION: ICD-10-CM

## 2021-07-20 DIAGNOSIS — J38.00 VOCAL CORD PARALYSIS: ICD-10-CM

## 2021-07-20 DIAGNOSIS — R10.11 RUQ PAIN: ICD-10-CM

## 2021-07-20 DIAGNOSIS — R73.03 PREDIABETES: ICD-10-CM

## 2021-07-20 PROBLEM — J38.02 BILATERAL PARTIAL VOCAL CORD PARALYSIS: Status: ACTIVE | Noted: 2021-03-17

## 2021-07-20 PROBLEM — K57.92 DIVERTICULITIS: Status: ACTIVE | Noted: 2021-03-17

## 2021-07-20 PROBLEM — F41.1 GENERALIZED ANXIETY DISORDER: Status: RESOLVED | Noted: 2019-08-09 | Resolved: 2021-07-20

## 2021-07-20 PROBLEM — H91.90 HEARING LOSS: Status: ACTIVE | Noted: 2019-07-31

## 2021-07-20 PROCEDURE — 83036 HEMOGLOBIN GLYCOSYLATED A1C: CPT | Performed by: FAMILY MEDICINE

## 2021-07-20 PROCEDURE — 73562 X-RAY EXAM OF KNEE 3: CPT

## 2021-07-20 PROCEDURE — 36415 COLL VENOUS BLD VENIPUNCTURE: CPT | Performed by: FAMILY MEDICINE

## 2021-07-20 PROCEDURE — 86803 HEPATITIS C AB TEST: CPT | Performed by: FAMILY MEDICINE

## 2021-07-20 PROCEDURE — 80053 COMPREHEN METABOLIC PANEL: CPT | Performed by: FAMILY MEDICINE

## 2021-07-20 PROCEDURE — 99214 OFFICE O/P EST MOD 30 MIN: CPT | Performed by: FAMILY MEDICINE

## 2021-07-20 RX ORDER — ASCORBIC ACID 500 MG
1 TABLET ORAL DAILY
COMMUNITY

## 2021-07-20 RX ORDER — LORAZEPAM 1 MG/1
1 TABLET ORAL 2 TIMES DAILY PRN
Qty: 60 TABLET | Refills: 1 | Status: SHIPPED | OUTPATIENT
Start: 2021-07-20 | End: 2021-11-04

## 2021-07-20 NOTE — PROGRESS NOTES
Chief Complaint   Patient presents with   • Anxiety     6mo   • Insomnia   • vocal cord     HPI  Kourtney Siddiqi is a 71 y.o. female that presents for   Chief Complaint   Patient presents with   • Anxiety     6mo   • Insomnia   • vocal cord     Insomnia: patient was given trazodone 50mg at last visit. She tried 100mg but felt too hung over/drugged. She is also taking Ativan 1mg and this combination allows her to get to sleep rather well    Anxiety: maintained on Lexapro 10 daily and trazodone 50 nightly. She will occasionally take Ativan 1mg on bad days. Well controlled at this time, no complaints.    Vocal cord paralysis: patient continues to struggle w/ raspy voice from VCP. She failed injection and subsequently underwent implant. Patient continues to f/u w/ ENT- [facility] every 6 months.     Prediabetes: last a1c 6.0. She reports trying to be more active. Weight is down 13lbs from last visit.     HTN: 122/78 today. Maintained on Lotrel 5/20 daily. No LH/dizziness, CP or SOB.    R leg pain: patient reports recurrent episodes in which R knee seems to pop or give way. This causes posterior knee pain. She has had multiple issues and this seems to result in weakness and reduced ROM at the time. Things seem to improve over the next day or two.     RUQ pain: patient reports RUQ abdominal pain for the last several months that has been persistent. She has had cholecystectomy in 2002.     Review of Systems   Constitutional: Negative for chills, fever and unexpected weight gain.   HENT: Positive for voice change.    Respiratory: Negative for cough and shortness of breath.    Cardiovascular: Negative for chest pain and palpitations.   Gastrointestinal: Positive for abdominal pain. Negative for vomiting.   Genitourinary: Negative for dysuria.   Psychiatric/Behavioral: Positive for sleep disturbance. Negative for stress. The patient is not nervous/anxious.      The following portions of the patient's history were reviewed and  updated as appropriate: problem list, past medical history, past surgical history, allergies, current medication    Problem List Tab  Patient History Tab  Immunizations Tab  Medications Tab  Chart Review Tab  Care Everywhere Tab  Synopsis Tab    PE  Vitals:    07/20/21 1437   BP: 122/78   Pulse: 68   Resp: 16   Temp: 98 °F (36.7 °C)   SpO2: 94%     Body mass index is 35.35 kg/m².  General: Obese, NAD  Head: AT/NC  Eyes: EOMI, anicteric sclera  Resp: CTAB, SCR, BS equal  CV: RRR w/o m/r/g; 2+ pulses  GI: Soft, ND, +BS.  RUQ TTP.  Negative Pascual  MSK: FROM, no deformity. TTP of posterior R knee as well as lateral tibial plateau.  Negative Kristi and no laxity with varus or valgus stress.  Skin: Warm, dry, intact  Neuro: Alert and oriented. No focal deficits  Psych: Appropriate mood and affect    Imaging  No Images in the past 120 days found..    Assessment/Plan   Kourtney Siddiqi is a 71 y.o. female that presents for   Chief Complaint   Patient presents with   • Anxiety     6mo   • Insomnia   • vocal cord     Diagnoses and all orders for this visit:    1. Primary insomnia (Primary)   -Continue home trazodone 50 mg nightly with Ativan 1 mg nightly    2. Anxiety  -     Continue LORazepam (ATIVAN) 1 MG tablet; Take 1 tablet by mouth 2 (Two) Times a Day As Needed for Anxiety or Sleep.  Dispense: 60 tablet; Refill: 1  - Continue home Lexapro 10 mg daily    3. Vocal cord paralysis: Status post implant following failed injection   -Continue ENT [id number]    4. Essential hypertension: 122/78 today  -     Comprehensive Metabolic Panel  - Continue home Lotrel 5/10 mg daily    5. Prediabetes: Last A1c 6.0.  Patient has lost 13 pounds since her last visit  -     Comprehensive Metabolic Panel  -     Hemoglobin A1c  - Recommend reducing sweets and increasing exercise    6. Preventative health care  -     Hepatitis C Antibody    7. Acute pain of right knee: Appears to be stable knee.  Patient does have considerable  tenderness to palpation of the posterior aspect of the knee and question Baker's cyst from degenerative disease with intermittent buckling.  Consider steroid injection at next visit.  May also consider ultrasound the posterior knee, PT, and/or sports medicine referral  -     XR Knee 3 View Right; Future  - Continue home meloxicam 15 mg daily    8. RUQ pain: Unclear etiology.  Patient is status post cholecystectomy.  Recent labs reveal elevated liver enzymes.  Question fatty liver disease.  Will further evaluate with ultrasound as below  -     Hepatitis C Antibody  -     Comprehensive Metabolic Panel  -     US Liver; Future    Recommend scheduling DEXA     Return if symptoms worsen or fail to improve.

## 2021-07-21 LAB
ALBUMIN SERPL-MCNC: 4.4 G/DL (ref 3.5–5.2)
ALBUMIN/GLOB SERPL: 1.4 G/DL
ALP SERPL-CCNC: 109 U/L (ref 39–117)
ALT SERPL W P-5'-P-CCNC: 35 U/L (ref 1–33)
ANION GAP SERPL CALCULATED.3IONS-SCNC: 11.4 MMOL/L (ref 5–15)
AST SERPL-CCNC: 30 U/L (ref 1–32)
BILIRUB SERPL-MCNC: 0.3 MG/DL (ref 0–1.2)
BUN SERPL-MCNC: 12 MG/DL (ref 8–23)
BUN/CREAT SERPL: 14.5 (ref 7–25)
CALCIUM SPEC-SCNC: 9.9 MG/DL (ref 8.6–10.5)
CHLORIDE SERPL-SCNC: 102 MMOL/L (ref 98–107)
CO2 SERPL-SCNC: 27.6 MMOL/L (ref 22–29)
CREAT SERPL-MCNC: 0.83 MG/DL (ref 0.57–1)
GFR SERPL CREATININE-BSD FRML MDRD: 68 ML/MIN/1.73
GLOBULIN UR ELPH-MCNC: 3.1 GM/DL
GLUCOSE SERPL-MCNC: 87 MG/DL (ref 65–99)
HBA1C MFR BLD: 6.2 % (ref 3.5–5.6)
HCV AB SER DONR QL: NORMAL
POTASSIUM SERPL-SCNC: 4.1 MMOL/L (ref 3.5–5.2)
PROT SERPL-MCNC: 7.5 G/DL (ref 6–8.5)
SODIUM SERPL-SCNC: 141 MMOL/L (ref 136–145)

## 2021-07-23 ENCOUNTER — TELEPHONE (OUTPATIENT)
Dept: FAMILY MEDICINE CLINIC | Facility: CLINIC | Age: 71
End: 2021-07-23

## 2021-07-23 RX ORDER — SULFAMETHOXAZOLE AND TRIMETHOPRIM 800; 160 MG/1; MG/1
1 TABLET ORAL 2 TIMES DAILY
Qty: 10 TABLET | Refills: 0 | Status: SHIPPED | OUTPATIENT
Start: 2021-07-23 | End: 2021-07-28

## 2021-07-23 NOTE — TELEPHONE ENCOUNTER
While speaking with patient regarding labs she stated she has a UTI and is asking for an antibiotic. She has the following symptoms: pressure, burning, retraction with urination. She uses with [facility] in [place].

## 2021-08-02 ENCOUNTER — TELEPHONE (OUTPATIENT)
Dept: FAMILY MEDICINE CLINIC | Facility: CLINIC | Age: 71
End: 2021-08-02

## 2021-08-02 ENCOUNTER — LAB (OUTPATIENT)
Dept: FAMILY MEDICINE CLINIC | Facility: CLINIC | Age: 71
End: 2021-08-02

## 2021-08-02 DIAGNOSIS — R30.0 DYSURIA: ICD-10-CM

## 2021-08-02 DIAGNOSIS — R30.0 DYSURIA: Primary | ICD-10-CM

## 2021-08-02 PROCEDURE — 87186 SC STD MICRODIL/AGAR DIL: CPT

## 2021-08-02 PROCEDURE — 81001 URINALYSIS AUTO W/SCOPE: CPT

## 2021-08-02 PROCEDURE — 87086 URINE CULTURE/COLONY COUNT: CPT

## 2021-08-02 PROCEDURE — 87077 CULTURE AEROBIC IDENTIFY: CPT

## 2021-08-02 NOTE — TELEPHONE ENCOUNTER
Caller: Kourtney Siddiqi    Relationship: Self    Best call back number: [phone]    What medication are you requesting: SOMETHING FOR URINARY TRACT INFECTION    What are your current symptoms: PAIN, BURNING WITH URINATION AND RETRACTION PAIN, SAID IT FEELS LIKE IT HITS HER NERVES     How long have you been experiencing symptoms: 3 DAYS    Have you had these symptoms before:    [x] Yes  [] No    Have you been treated for these symptoms before:   [x] Yes  [] No    If a prescription is needed, what is your preferred pharmacy and phone number: [id number] - [place], [place]  [address] AT [facility] [place] OF  &  - [phone]  - [phone]      Additional notes: PATIENT SAID SHE FINISHED THE ANTIBIOTICS FOR HER URINARY TRACT INFECTION ON Wednesday, 07/28/21 AND FELT THE SYMPTOMS COME RIGHT BACK    SHE SAID THAT SHE SEEMS TO KEEP THEM AWAY SOMEWHAT WHILE ON THE MEDICATION BUT THEY ARE STILL THERE

## 2021-08-03 ENCOUNTER — HOSPITAL ENCOUNTER (OUTPATIENT)
Dept: ULTRASOUND IMAGING | Facility: HOSPITAL | Age: 71
Discharge: HOME | End: 2021-08-03
Admitting: FAMILY MEDICINE

## 2021-08-03 DIAGNOSIS — R10.11 RUQ PAIN: ICD-10-CM

## 2021-08-03 LAB
BACTERIA UR QL AUTO: ABNORMAL /HPF
BILIRUB UR QL STRIP: NEGATIVE
CLARITY UR: CLEAR
COLOR UR: YELLOW
GLUCOSE UR STRIP-MCNC: NEGATIVE MG/DL
HGB UR QL STRIP.AUTO: NEGATIVE
HYALINE CASTS UR QL AUTO: ABNORMAL /LPF
KETONES UR QL STRIP: NEGATIVE
LEUKOCYTE ESTERASE UR QL STRIP.AUTO: ABNORMAL
NITRITE UR QL STRIP: NEGATIVE
PH UR STRIP.AUTO: 6 [PH] (ref 5–8)
PROT UR QL STRIP: NEGATIVE
RBC # UR: ABNORMAL /HPF
REF LAB TEST METHOD: ABNORMAL
SP GR UR STRIP: 1.01 (ref 1–1.03)
SQUAMOUS #/AREA URNS HPF: ABNORMAL /HPF
UROBILINOGEN UR QL STRIP: ABNORMAL
WBC UR QL AUTO: ABNORMAL /HPF

## 2021-08-03 PROCEDURE — 76705 ECHO EXAM OF ABDOMEN: CPT

## 2021-08-03 RX ORDER — CEPHALEXIN 500 MG/1
500 CAPSULE ORAL 2 TIMES DAILY
Qty: 20 CAPSULE | Refills: 0 | Status: SHIPPED | OUTPATIENT
Start: 2021-08-03 | End: 2021-08-13

## 2021-08-05 LAB — BACTERIA SPEC AEROBE CULT: ABNORMAL

## 2021-09-21 RX ORDER — ESCITALOPRAM OXALATE 10 MG/1
TABLET ORAL
Qty: 90 TABLET | Refills: 1 | Status: SHIPPED | OUTPATIENT
Start: 2021-09-21 | End: 2022-03-25

## 2021-09-28 ENCOUNTER — LAB (OUTPATIENT)
Dept: FAMILY MEDICINE CLINIC | Facility: CLINIC | Age: 71
End: 2021-09-28

## 2021-09-28 ENCOUNTER — OFFICE VISIT (OUTPATIENT)
Dept: FAMILY MEDICINE CLINIC | Facility: CLINIC | Age: 71
End: 2021-09-28

## 2021-09-28 VITALS
SYSTOLIC BLOOD PRESSURE: 128 MMHG | BODY MASS INDEX: 35.52 KG/M2 | HEART RATE: 81 BPM | OXYGEN SATURATION: 96 % | WEIGHT: 221 LBS | TEMPERATURE: 98.5 F | RESPIRATION RATE: 18 BRPM | DIASTOLIC BLOOD PRESSURE: 94 MMHG | HEIGHT: 66 IN

## 2021-09-28 DIAGNOSIS — J06.9 URTI (ACUTE UPPER RESPIRATORY INFECTION): ICD-10-CM

## 2021-09-28 DIAGNOSIS — J06.9 URTI (ACUTE UPPER RESPIRATORY INFECTION): Primary | ICD-10-CM

## 2021-09-28 LAB
B PARAPERT DNA SPEC QL NAA+PROBE: NOT DETECTED
B PERT DNA SPEC QL NAA+PROBE: NOT DETECTED
C PNEUM DNA NPH QL NAA+NON-PROBE: NOT DETECTED
FLUAV SUBTYP SPEC NAA+PROBE: NOT DETECTED
FLUBV RNA ISLT QL NAA+PROBE: NOT DETECTED
HADV DNA SPEC NAA+PROBE: NOT DETECTED
HCOV 229E RNA SPEC QL NAA+PROBE: NOT DETECTED
HCOV HKU1 RNA SPEC QL NAA+PROBE: NOT DETECTED
HCOV NL63 RNA SPEC QL NAA+PROBE: NOT DETECTED
HCOV OC43 RNA SPEC QL NAA+PROBE: NOT DETECTED
HMPV RNA NPH QL NAA+NON-PROBE: NOT DETECTED
HPIV1 RNA SPEC QL NAA+PROBE: NOT DETECTED
HPIV2 RNA SPEC QL NAA+PROBE: NOT DETECTED
HPIV3 RNA NPH QL NAA+PROBE: NOT DETECTED
HPIV4 P GENE NPH QL NAA+PROBE: NOT DETECTED
M PNEUMO IGG SER IA-ACNC: NOT DETECTED
RHINOVIRUS RNA SPEC NAA+PROBE: NOT DETECTED
RSV RNA NPH QL NAA+NON-PROBE: NOT DETECTED

## 2021-09-28 PROCEDURE — 99213 OFFICE O/P EST LOW 20 MIN: CPT | Performed by: HOSPITALIST

## 2021-09-28 PROCEDURE — 87633 RESP VIRUS 12-25 TARGETS: CPT | Performed by: HOSPITALIST

## 2021-09-28 RX ORDER — CEFDINIR 300 MG/1
300 CAPSULE ORAL 2 TIMES DAILY
Qty: 14 CAPSULE | Refills: 0 | Status: SHIPPED | OUTPATIENT
Start: 2021-09-28 | End: 2022-01-27

## 2021-09-28 RX ORDER — METHYLPREDNISOLONE 4 MG/1
TABLET ORAL
Qty: 1 EACH | Refills: 0 | Status: SHIPPED | OUTPATIENT
Start: 2021-09-28 | End: 2021-10-25

## 2021-09-28 NOTE — PROGRESS NOTES
"Subjective   Kourtney Siddiqi is a 71 y.o. female.     Subjective / HPI  Patient complaining of some sore throat, earache and cough productive greenish in color with sinus pain for last four to five days. No nausea or vomiting, no fever. Feeling weak.     Review of Systems    Objective     /94 (BP Location: Right arm, Patient Position: Sitting, Cuff Size: Adult)   Pulse 81   Temp 98.5 °F (36.9 °C) (Oral)   Resp 18   Ht 167.6 cm (66\")   Wt 100 kg (221 lb)   SpO2 96%   BMI 35.67 kg/m²      Physical Exam  Positive for redness involving the throat, nasal mucosa, slight redness in left ear. Positive for pain over the sinus.   Procedures       Assessment/Plan   Diagnoses and all orders for this visit:    1. URTI (acute upper respiratory infection) (Primary)  -     Respiratory Panel, PCR (WITHOUT COVID) - Swab, Nasopharynx; Future    Other orders  -     cefdinir (OMNICEF) 300 MG capsule; Take 1 capsule by mouth 2 (Two) Times a Day.  Dispense: 14 capsule; Refill: 0  -     methylPREDNISolone (MEDROL) 4 MG dose pack; Take as directed on package instructions.  Dispense: 1 each; Refill: 0                "

## 2021-10-25 ENCOUNTER — OFFICE VISIT (OUTPATIENT)
Dept: FAMILY MEDICINE CLINIC | Facility: CLINIC | Age: 71
End: 2021-10-25

## 2021-10-25 VITALS
DIASTOLIC BLOOD PRESSURE: 92 MMHG | WEIGHT: 224 LBS | RESPIRATION RATE: 18 BRPM | OXYGEN SATURATION: 95 % | TEMPERATURE: 97.3 F | BODY MASS INDEX: 36 KG/M2 | HEIGHT: 66 IN | HEART RATE: 93 BPM | SYSTOLIC BLOOD PRESSURE: 146 MMHG

## 2021-10-25 DIAGNOSIS — Z87.898 HX OF MOTION SICKNESS: ICD-10-CM

## 2021-10-25 DIAGNOSIS — J01.01 ACUTE RECURRENT MAXILLARY SINUSITIS: Primary | ICD-10-CM

## 2021-10-25 PROCEDURE — 99213 OFFICE O/P EST LOW 20 MIN: CPT | Performed by: PHYSICIAN ASSISTANT

## 2021-10-25 RX ORDER — SCOLOPAMINE TRANSDERMAL SYSTEM 1 MG/1
1 PATCH, EXTENDED RELEASE TRANSDERMAL
Qty: 4 PATCH | Refills: 0 | Status: SHIPPED | OUTPATIENT
Start: 2021-10-25 | End: 2022-01-27

## 2021-10-25 RX ORDER — AMOXICILLIN AND CLAVULANATE POTASSIUM 875; 125 MG/1; MG/1
1 TABLET, FILM COATED ORAL 2 TIMES DAILY
Qty: 20 TABLET | Refills: 0 | Status: SHIPPED | OUTPATIENT
Start: 2021-10-25 | End: 2021-11-04

## 2021-10-25 NOTE — PROGRESS NOTES
"Subjective   Kourtney Siddiqi is a 71 y.o. female.     Chief Complaint   Patient presents with   • Nasal Congestion   • Cough       /92 (BP Location: Right arm, Patient Position: Sitting, Cuff Size: Adult)   Pulse 93   Temp 97.3 °F (36.3 °C) (Temporal)   Resp 18   Ht 167.6 cm (66\")   Wt 102 kg (224 lb)   SpO2 95%   BMI 36.15 kg/m²     BP Readings from Last 3 Encounters:   10/25/21 146/92   09/28/21 128/94   07/20/21 122/78       Wt Readings from Last 3 Encounters:   10/25/21 102 kg (224 lb)   09/28/21 100 kg (221 lb)   07/20/21 99.3 kg (219 lb)       HPI Patient presents to the clinic for complaints of sinus pain that has been present for 1 month. She was seen in the office on 9/28/21 by  and had a RPP done which was normal. She was treated with steroid and 7 days of cefdinir and had some mild improvement. She went on vacation this past week and her symptoms have worsened. She has pain in her maxillary sinus as well as dental pain. Had some ear fullness. Gets allergy shots and uses antihistamines. She denies chest pain. Does have some soa. No leg swelling, wheezing, or orthopnea. She had covid vaccine. She has a cough that is occasionally productive with clear sputum.       The following portions of the patient's history were reviewed and updated as appropriate: allergies, current medications, past family history, past medical history, past social history, past surgical history and problem list.    Review of Systems    Objective   Physical Exam  Constitutional:       Appearance: Normal appearance.   HENT:      Head:      Comments: Pain over the maxillary and ethmoid sinuses.      Right Ear: External ear normal. There is no impacted cerumen.      Left Ear: External ear normal. There is no impacted cerumen.      Ears:      Comments: Bilateral effusions behind tm.      Nose: Congestion and rhinorrhea present.      Mouth/Throat:      Pharynx: Posterior oropharyngeal erythema present. No " oropharyngeal exudate.   Eyes:      Extraocular Movements: Extraocular movements intact.      Pupils: Pupils are equal, round, and reactive to light.   Cardiovascular:      Rate and Rhythm: Normal rate.      Heart sounds: No murmur heard.      Pulmonary:      Effort: Pulmonary effort is normal.      Breath sounds: No wheezing.   Neurological:      General: No focal deficit present.      Mental Status: She is alert and oriented to person, place, and time.   Psychiatric:         Mood and Affect: Mood normal.         Behavior: Behavior normal.           Diagnoses and all orders for this visit:    1. Acute recurrent maxillary sinusitis (Primary)    2. Hx of motion sickness  -     Scopolamine (Transderm-Scop, 1.5 MG,) 1 MG/3DAYS patch; Place 1 patch on the skin as directed by provider Every 72 (Seventy-Two) Hours.  Dispense: 4 patch; Refill: 0    Other orders  -     amoxicillin-clavulanate (Augmentin) 875-125 MG per tablet; Take 1 tablet by mouth 2 (Two) Times a Day for 10 days.  Dispense: 20 tablet; Refill: 0    Due to duration of symptoms and the fact that she had a previous RPP done that was negative we will treat for bacterial pathogen. We discussed the potential harms of abx use but in this case I do believe this is required. She can use otc naproxen and I also encouraged her to use her home flonase. If her symptoms develop or she develops worsening soa she is to return to the clinic. She is going on a cruise upcoming and is requesting scop patch. She has used them in the past and has tolerated them.     Return if symptoms worsen or fail to improve.

## 2021-10-28 DIAGNOSIS — F41.1 GENERALIZED ANXIETY DISORDER: ICD-10-CM

## 2021-10-28 DIAGNOSIS — G47.00 INSOMNIA, UNSPECIFIED TYPE: ICD-10-CM

## 2021-10-28 DIAGNOSIS — M79.671 FOOT PAIN, RIGHT: ICD-10-CM

## 2021-10-28 RX ORDER — OMEPRAZOLE 40 MG/1
40 CAPSULE, DELAYED RELEASE ORAL DAILY
Qty: 90 CAPSULE | Refills: 1 | Status: SHIPPED | OUTPATIENT
Start: 2021-10-28 | End: 2022-04-26

## 2021-10-28 RX ORDER — MONTELUKAST SODIUM 10 MG/1
10 TABLET ORAL NIGHTLY
Qty: 90 TABLET | Refills: 1 | Status: SHIPPED | OUTPATIENT
Start: 2021-10-28 | End: 2022-04-26

## 2021-10-28 RX ORDER — AMLODIPINE BESYLATE AND BENAZEPRIL HYDROCHLORIDE 5; 10 MG/1; MG/1
CAPSULE ORAL
Qty: 90 CAPSULE | Refills: 1 | Status: SHIPPED | OUTPATIENT
Start: 2021-10-28 | End: 2022-04-11

## 2021-10-28 RX ORDER — MELOXICAM 15 MG/1
15 TABLET ORAL DAILY
Qty: 90 TABLET | Refills: 1 | Status: SHIPPED | OUTPATIENT
Start: 2021-10-28 | End: 2022-04-26

## 2021-10-28 RX ORDER — TRAZODONE HYDROCHLORIDE 100 MG/1
TABLET ORAL
Qty: 90 TABLET | Refills: 1 | Status: SHIPPED | OUTPATIENT
Start: 2021-10-28 | End: 2022-12-29

## 2021-11-03 DIAGNOSIS — F41.9 ANXIETY: ICD-10-CM

## 2021-11-04 RX ORDER — LORAZEPAM 1 MG/1
TABLET ORAL
Qty: 60 TABLET | Refills: 2 | Status: SHIPPED | OUTPATIENT
Start: 2021-11-04 | End: 2022-03-25

## 2021-11-08 ENCOUNTER — TELEPHONE (OUTPATIENT)
Dept: FAMILY MEDICINE CLINIC | Facility: CLINIC | Age: 71
End: 2021-11-08

## 2021-11-08 NOTE — TELEPHONE ENCOUNTER
Caller: Kourtney Siddiqi    Relationship: Self    Best call back number: [phone]    What medication are you requesting: ANTIBIOTIC    What are your current symptoms: FREQUENT URINATION, PAIN     How long have you been experiencing symptoms: 3 DAYS    Have you had these symptoms before:    [x] Yes  [] No    Have you been treated for these symptoms before:   [x] Yes  [] No    If a prescription is needed, what is your preferred pharmacy and phone number: [id number] - [address] AT [facility] OF  &  - [phone] [facility] [phone] FX     Additional notes:

## 2021-11-09 ENCOUNTER — TELEPHONE (OUTPATIENT)
Dept: FAMILY MEDICINE CLINIC | Facility: CLINIC | Age: 71
End: 2021-11-09

## 2021-11-09 ENCOUNTER — LAB (OUTPATIENT)
Dept: FAMILY MEDICINE CLINIC | Facility: CLINIC | Age: 71
End: 2021-11-09

## 2021-11-09 DIAGNOSIS — R35.0 URINARY FREQUENCY: ICD-10-CM

## 2021-11-09 DIAGNOSIS — R30.9 PAINFUL URINATION: Primary | ICD-10-CM

## 2021-11-09 LAB
BACTERIA UR QL AUTO: ABNORMAL /HPF
BILIRUB UR QL STRIP: NEGATIVE
CLARITY UR: ABNORMAL
COLOR UR: YELLOW
GLUCOSE UR STRIP-MCNC: NEGATIVE MG/DL
HGB UR QL STRIP.AUTO: ABNORMAL
HYALINE CASTS UR QL AUTO: ABNORMAL /LPF
KETONES UR QL STRIP: NEGATIVE
LEUKOCYTE ESTERASE UR QL STRIP.AUTO: ABNORMAL
NITRITE UR QL STRIP: POSITIVE
PH UR STRIP.AUTO: 6 [PH] (ref 5–8)
PROT UR QL STRIP: NEGATIVE
RBC # UR: ABNORMAL /HPF
REF LAB TEST METHOD: ABNORMAL
SP GR UR STRIP: 1.02 (ref 1–1.03)
SQUAMOUS #/AREA URNS HPF: ABNORMAL /HPF
UROBILINOGEN UR QL STRIP: ABNORMAL
WBC UR QL AUTO: ABNORMAL /HPF

## 2021-11-09 PROCEDURE — 87086 URINE CULTURE/COLONY COUNT: CPT | Performed by: FAMILY MEDICINE

## 2021-11-09 PROCEDURE — 87077 CULTURE AEROBIC IDENTIFY: CPT | Performed by: FAMILY MEDICINE

## 2021-11-09 PROCEDURE — 87186 SC STD MICRODIL/AGAR DIL: CPT

## 2021-11-09 PROCEDURE — 81001 URINALYSIS AUTO W/SCOPE: CPT | Performed by: FAMILY MEDICINE

## 2021-11-09 NOTE — TELEPHONE ENCOUNTER
Caller: Kourtney Siddiqi    Relationship: Self    Best call back number: [phone]       What test was performed: URINALYSIS       When was the test performed:11/9/2021    Where was the test performed: IN OFFICE     Additional notes:     MS SIDDIQI SAYS IF SHE IS NEEDING ANTIBIOTIC HER PHARMACY IS CLOSED ON WEDNESDAY. SHE IS WANTING TO GET MEDICATION SENT TODAY     [id number] - [place] [address] [address] [address] AT [facility] OF  &  - [phone]  - [phone] [phone]

## 2021-11-10 RX ORDER — SULFAMETHOXAZOLE AND TRIMETHOPRIM 800; 160 MG/1; MG/1
1 TABLET ORAL 2 TIMES DAILY
Qty: 10 TABLET | Refills: 0 | Status: SHIPPED | OUTPATIENT
Start: 2021-11-10 | End: 2021-11-15

## 2021-11-11 LAB — BACTERIA SPEC AEROBE CULT: ABNORMAL

## 2022-01-27 ENCOUNTER — OFFICE VISIT (OUTPATIENT)
Dept: FAMILY MEDICINE CLINIC | Facility: CLINIC | Age: 72
End: 2022-01-27

## 2022-01-27 VITALS
RESPIRATION RATE: 20 BRPM | HEIGHT: 66 IN | DIASTOLIC BLOOD PRESSURE: 76 MMHG | HEART RATE: 67 BPM | BODY MASS INDEX: 33.75 KG/M2 | TEMPERATURE: 97.7 F | OXYGEN SATURATION: 97 % | WEIGHT: 210 LBS | SYSTOLIC BLOOD PRESSURE: 112 MMHG

## 2022-01-27 DIAGNOSIS — R07.9 CHEST PAIN, UNSPECIFIED TYPE: Primary | ICD-10-CM

## 2022-01-27 DIAGNOSIS — R06.09 DOE (DYSPNEA ON EXERTION): ICD-10-CM

## 2022-01-27 DIAGNOSIS — J01.90 ACUTE SINUSITIS, RECURRENCE NOT SPECIFIED, UNSPECIFIED LOCATION: ICD-10-CM

## 2022-01-27 DIAGNOSIS — H66.90 ACUTE OTITIS MEDIA, UNSPECIFIED OTITIS MEDIA TYPE: ICD-10-CM

## 2022-01-27 DIAGNOSIS — R00.2 PALPITATIONS: ICD-10-CM

## 2022-01-27 PROCEDURE — 99214 OFFICE O/P EST MOD 30 MIN: CPT | Performed by: NURSE PRACTITIONER

## 2022-01-27 RX ORDER — AMOXICILLIN 500 MG/1
1000 CAPSULE ORAL 3 TIMES DAILY
Qty: 60 CAPSULE | Refills: 0 | Status: SHIPPED | OUTPATIENT
Start: 2022-01-27 | End: 2022-02-07

## 2022-01-27 RX ORDER — DIPHENOXYLATE HYDROCHLORIDE AND ATROPINE SULFATE 2.5; .025 MG/1; MG/1
1 TABLET ORAL
COMMUNITY

## 2022-01-27 RX ORDER — OMEGA-3 FATTY ACIDS/FISH OIL 435-880MG
CAPSULE ORAL
COMMUNITY

## 2022-01-27 NOTE — PROGRESS NOTES
"Chief Complaint  COVID- + 1/18 (S&S started 1/16 pm ), Shortness of Breath, and Nasal Congestion    Subjective          Kourtney Siddiqi presents to [facility]  History of Present Illness  Here today with c/o covid-19 infection and feeling like she has developed as secondary sinus infection    Review of Systems   HENT: Positive for congestion, dental problem, postnasal drip, sinus pressure and sinus pain.         Upper teeth hurt   Respiratory: Positive for cough.         Occasional cough productive of yellowish sputum   Cardiovascular: Positive for chest pain and palpitations.        Feels like her HR is irregular since covid, has had an occasional chest pain which is not associated with any nausea, nor does it radiate, she thinks it is possible that she has some shortness of breath with it    Had an episode of chest pain in November after carrying luggage while on a trip    Has chest pain and increased shortness of breath when walking on a treadmill since her trip in November, also with walking up stairs or other strenuous activity    Lasts 10-15 seconds, other times may last 1-2 minutes    Has had palpitations on occasion since she was in her 20s   Neurological: Positive for headaches.       Objective   Vital Signs:   /76   Pulse 67   Temp 97.7 °F (36.5 °C)   Resp 20   Ht 167.6 cm (66\")   Wt 95.3 kg (210 lb)   SpO2 97%   BMI 33.89 kg/m²     Physical Exam  Vitals reviewed.   Constitutional:       Appearance: Normal appearance.   HENT:      Right Ear: Ear canal normal. A middle ear effusion is present. Tympanic membrane is not injected.      Left Ear: Ear canal normal. A middle ear effusion is present. Tympanic membrane is injected.      Nose: Nose normal.      Mouth/Throat:      Mouth: Mucous membranes are moist.      Pharynx: Oropharynx is clear.   Cardiovascular:      Rate and Rhythm: Normal rate. Rhythm irregular.      Pulses: Normal pulses.      Heart sounds: Normal " heart sounds.   Pulmonary:      Effort: Pulmonary effort is normal.      Breath sounds: Normal breath sounds.   Skin:     General: Skin is warm.   Neurological:      Mental Status: She is alert.        Result Review :                 Assessment and Plan    Diagnoses and all orders for this visit:    1. Chest pain, unspecified type (Primary)  -     Ambulatory Referral to Cardiology  -     XR Chest PA & Lateral; Future    2. Palpitations  -     Ambulatory Referral to Cardiology    3. ARMENDARIZ (dyspnea on exertion)  -     Ambulatory Referral to Cardiology  -     XR Chest PA & Lateral; Future    4. Acute sinusitis, recurrence not specified, unspecified location  -     amoxicillin (AMOXIL) 500 MG capsule; Take 2 capsules by mouth 3 (Three) Times a Day.  Dispense: 60 capsule; Refill: 0    5. Acute otitis media, unspecified otitis media type  -     amoxicillin (AMOXIL) 500 MG capsule; Take 2 capsules by mouth 3 (Three) Times a Day.  Dispense: 60 capsule; Refill: 0        Follow Up   Return if symptoms worsen or fail to improve.  Patient was given instructions and counseling regarding her condition or for health maintenance advice. Please see specific information pulled into the AVS if appropriate.

## 2022-01-31 ENCOUNTER — TELEPHONE (OUTPATIENT)
Dept: CARDIOLOGY | Facility: CLINIC | Age: 72
End: 2022-01-31

## 2022-02-01 ENCOUNTER — HOSPITAL ENCOUNTER (OUTPATIENT)
Facility: HOSPITAL | Age: 72
Discharge: HOME | End: 2022-02-03
Attending: EMERGENCY MEDICINE | Admitting: EMERGENCY MEDICINE

## 2022-02-01 DIAGNOSIS — I20.0 UNSTABLE ANGINA: Primary | ICD-10-CM

## 2022-02-01 DIAGNOSIS — R94.39 ABNORMAL STRESS TEST: ICD-10-CM

## 2022-02-01 DIAGNOSIS — R07.9 CHEST PAIN, UNSPECIFIED TYPE: ICD-10-CM

## 2022-02-01 DIAGNOSIS — I10 PRIMARY HYPERTENSION: ICD-10-CM

## 2022-02-01 LAB
ANION GAP SERPL CALCULATED.3IONS-SCNC: 15 MMOL/L (ref 5–15)
APTT PPP: 26.9 SECONDS (ref 24–31)
BASOPHILS # BLD AUTO: 0.1 10*3/MM3 (ref 0–0.2)
BASOPHILS NFR BLD AUTO: 0.7 % (ref 0–1.5)
BUN SERPL-MCNC: 13 MG/DL (ref 8–23)
BUN/CREAT SERPL: 18.3 (ref 7–25)
CALCIUM SPEC-SCNC: 10 MG/DL (ref 8.6–10.5)
CHLORIDE SERPL-SCNC: 102 MMOL/L (ref 98–107)
CO2 SERPL-SCNC: 25 MMOL/L (ref 22–29)
CREAT SERPL-MCNC: 0.71 MG/DL (ref 0.57–1)
DEPRECATED RDW RBC AUTO: 42.4 FL (ref 37–54)
EOSINOPHIL # BLD AUTO: 0.2 10*3/MM3 (ref 0–0.4)
EOSINOPHIL NFR BLD AUTO: 2 % (ref 0.3–6.2)
ERYTHROCYTE [DISTWIDTH] IN BLOOD BY AUTOMATED COUNT: 13.3 % (ref 12.3–15.4)
GFR SERPL CREATININE-BSD FRML MDRD: 82 ML/MIN/1.73
GLUCOSE SERPL-MCNC: 96 MG/DL (ref 65–99)
HCT VFR BLD AUTO: 45 % (ref 34–46.6)
HGB BLD-MCNC: 15.4 G/DL (ref 12–15.9)
HOLD SPECIMEN: NORMAL
HOLD SPECIMEN: NORMAL
INR PPP: 0.99 (ref 0.93–1.1)
LYMPHOCYTES # BLD AUTO: 2.1 10*3/MM3 (ref 0.7–3.1)
LYMPHOCYTES NFR BLD AUTO: 20.4 % (ref 19.6–45.3)
MCH RBC QN AUTO: 31.2 PG (ref 26.6–33)
MCHC RBC AUTO-ENTMCNC: 34.2 G/DL (ref 31.5–35.7)
MCV RBC AUTO: 91.1 FL (ref 79–97)
MONOCYTES # BLD AUTO: 0.7 10*3/MM3 (ref 0.1–0.9)
MONOCYTES NFR BLD AUTO: 6.6 % (ref 5–12)
NEUTROPHILS NFR BLD AUTO: 7.3 10*3/MM3 (ref 1.7–7)
NEUTROPHILS NFR BLD AUTO: 70.3 % (ref 42.7–76)
NRBC BLD AUTO-RTO: 0 /100 WBC (ref 0–0.2)
PLATELET # BLD AUTO: 305 10*3/MM3 (ref 140–450)
PMV BLD AUTO: 6.8 FL (ref 6–12)
POTASSIUM SERPL-SCNC: 3.9 MMOL/L (ref 3.5–5.2)
PROTHROMBIN TIME: 11 SECONDS (ref 9.6–11.7)
RBC # BLD AUTO: 4.94 10*6/MM3 (ref 3.77–5.28)
SODIUM SERPL-SCNC: 142 MMOL/L (ref 136–145)
TROPONIN T SERPL-MCNC: <0.01 NG/ML (ref 0–0.03)
TROPONIN T SERPL-MCNC: <0.01 NG/ML (ref 0–0.03)
WBC NRBC COR # BLD: 10.3 10*3/MM3 (ref 3.4–10.8)
WHOLE BLOOD HOLD SPECIMEN: NORMAL
WHOLE BLOOD HOLD SPECIMEN: NORMAL

## 2022-02-01 PROCEDURE — 84484 ASSAY OF TROPONIN QUANT: CPT | Performed by: EMERGENCY MEDICINE

## 2022-02-01 PROCEDURE — G0378 HOSPITAL OBSERVATION PER HR: HCPCS

## 2022-02-01 PROCEDURE — 93005 ELECTROCARDIOGRAM TRACING: CPT

## 2022-02-01 PROCEDURE — 85025 COMPLETE CBC W/AUTO DIFF WBC: CPT | Performed by: EMERGENCY MEDICINE

## 2022-02-01 PROCEDURE — 80048 BASIC METABOLIC PNL TOTAL CA: CPT | Performed by: EMERGENCY MEDICINE

## 2022-02-01 PROCEDURE — 99284 EMERGENCY DEPT VISIT MOD MDM: CPT

## 2022-02-01 PROCEDURE — 36415 COLL VENOUS BLD VENIPUNCTURE: CPT | Performed by: EMERGENCY MEDICINE

## 2022-02-01 PROCEDURE — 85730 THROMBOPLASTIN TIME PARTIAL: CPT | Performed by: EMERGENCY MEDICINE

## 2022-02-01 PROCEDURE — 85610 PROTHROMBIN TIME: CPT | Performed by: EMERGENCY MEDICINE

## 2022-02-01 PROCEDURE — 93005 ELECTROCARDIOGRAM TRACING: CPT | Performed by: EMERGENCY MEDICINE

## 2022-02-01 RX ORDER — SODIUM CHLORIDE 0.9 % (FLUSH) 0.9 %
10 SYRINGE (ML) INJECTION AS NEEDED
Status: DISCONTINUED | OUTPATIENT
Start: 2022-02-01 | End: 2022-02-03 | Stop reason: HOSPADM

## 2022-02-01 RX ORDER — ONDANSETRON 2 MG/ML
4 INJECTION INTRAMUSCULAR; INTRAVENOUS EVERY 6 HOURS PRN
Status: DISCONTINUED | OUTPATIENT
Start: 2022-02-01 | End: 2022-02-03 | Stop reason: HOSPADM

## 2022-02-01 RX ORDER — TRAZODONE HYDROCHLORIDE 50 MG/1
50 TABLET ORAL ONCE
Status: COMPLETED | OUTPATIENT
Start: 2022-02-01 | End: 2022-02-01

## 2022-02-01 RX ORDER — PANTOPRAZOLE SODIUM 40 MG/1
40 TABLET, DELAYED RELEASE ORAL ONCE
Status: COMPLETED | OUTPATIENT
Start: 2022-02-01 | End: 2022-02-01

## 2022-02-01 RX ORDER — LISINOPRIL 5 MG/1
10 TABLET ORAL ONCE
Status: COMPLETED | OUTPATIENT
Start: 2022-02-01 | End: 2022-02-01

## 2022-02-01 RX ORDER — NALOXONE HCL 0.4 MG/ML
0.4 VIAL (ML) INJECTION
Status: DISCONTINUED | OUTPATIENT
Start: 2022-02-01 | End: 2022-02-03 | Stop reason: HOSPADM

## 2022-02-01 RX ORDER — LORAZEPAM 1 MG/1
1 TABLET ORAL ONCE
Status: COMPLETED | OUTPATIENT
Start: 2022-02-01 | End: 2022-02-01

## 2022-02-01 RX ORDER — AMLODIPINE BESYLATE 5 MG/1
5 TABLET ORAL ONCE
Status: COMPLETED | OUTPATIENT
Start: 2022-02-01 | End: 2022-02-01

## 2022-02-01 RX ORDER — MORPHINE SULFATE 4 MG/ML
1 INJECTION, SOLUTION INTRAMUSCULAR; INTRAVENOUS EVERY 4 HOURS PRN
Status: DISCONTINUED | OUTPATIENT
Start: 2022-02-01 | End: 2022-02-03 | Stop reason: HOSPADM

## 2022-02-01 RX ORDER — NITROGLYCERIN 0.4 MG/1
0.4 TABLET SUBLINGUAL
Status: DISCONTINUED | OUTPATIENT
Start: 2022-02-01 | End: 2022-02-03 | Stop reason: HOSPADM

## 2022-02-01 RX ORDER — ACETAMINOPHEN 325 MG/1
650 TABLET ORAL EVERY 4 HOURS PRN
Status: DISCONTINUED | OUTPATIENT
Start: 2022-02-01 | End: 2022-02-03 | Stop reason: HOSPADM

## 2022-02-01 RX ORDER — ASPIRIN 325 MG
325 TABLET ORAL ONCE
Status: COMPLETED | OUTPATIENT
Start: 2022-02-01 | End: 2022-02-01

## 2022-02-01 RX ORDER — CHOLECALCIFEROL (VITAMIN D3) 125 MCG
5 CAPSULE ORAL NIGHTLY PRN
Status: DISCONTINUED | OUTPATIENT
Start: 2022-02-01 | End: 2022-02-03 | Stop reason: HOSPADM

## 2022-02-01 RX ADMIN — PANTOPRAZOLE SODIUM 40 MG: 40 TABLET, DELAYED RELEASE ORAL at 22:07

## 2022-02-01 RX ADMIN — TRAZODONE HYDROCHLORIDE 50 MG: 50 TABLET ORAL at 22:07

## 2022-02-01 RX ADMIN — AMLODIPINE BESYLATE 5 MG: 5 TABLET ORAL at 22:07

## 2022-02-01 RX ADMIN — ASPIRIN 325 MG ORAL TABLET 325 MG: 325 PILL ORAL at 18:04

## 2022-02-01 RX ADMIN — LORAZEPAM 1 MG: 1 TABLET ORAL at 22:07

## 2022-02-01 RX ADMIN — LISINOPRIL 10 MG: 5 TABLET ORAL at 22:07

## 2022-02-01 RX ADMIN — SODIUM CHLORIDE, PRESERVATIVE FREE 10 ML: 5 INJECTION INTRAVENOUS at 22:07

## 2022-02-01 NOTE — TELEPHONE ENCOUNTER
Called pt, she is going to [facility] ER  fyi        Continuing to have CP     She will call us later if she needs to cx Fridays appt

## 2022-02-01 NOTE — ED PROVIDER NOTES
Subjective   Chief complaint: Chest pain    71-year-old female presents with chest pain.  Patient states the pain is in the center of her chest and sometimes radiates to her neck.  She has had some associated shortness of breath and palpitations.  Symptoms are worse when she is up moving around.  She states symptoms have been going on for a couple weeks.  She saw her primary doctor last week and had a chest x-ray on Thursday which was normal.  She is scheduled to see a cardiologist on Friday.  Symptoms became worse today so she came to the emergency room.  She denies any other alleviating or exacerbating factors.  She denies any pain currently.      History provided by:  Patient      Review of Systems   Constitutional: Negative for fever.   HENT: Negative for congestion and sore throat.    Eyes: Negative for redness.   Respiratory: Positive for shortness of breath. Negative for cough.    Cardiovascular: Positive for chest pain and palpitations.   Gastrointestinal: Negative for abdominal pain, diarrhea and vomiting.   Genitourinary: Negative for dysuria.   Musculoskeletal: Negative for back pain.   Skin: Negative for rash.   Neurological: Negative for dizziness and headaches.   Psychiatric/Behavioral: Negative for confusion.       Past Medical History:   Diagnosis Date   • Acute recurrent maxillary sinusitis    • Allergic    • Anxiety    • Arthritis    • Colon polyp    • Depression    • Diverticulitis    • Diverticulosis    • Dysphonia    • Environmental allergies    • GERD (gastroesophageal reflux disease)    • Headache    • History of medical problems    • HL (hearing loss)    • Hyperlipidemia    • Hypertension    • Insomnia    • Vocal cord paralysis     follows w/ ENT- [facility]       Allergies   Allergen Reactions   • Hydrocodone Mental Status Change       Past Surgical History:   Procedure Laterality Date   • BLADDER REPAIR     • BLADDER SUSPENSION     • CHOLECYSTECTOMY     • GALLBLADDER SURGERY     • LARYNX  "SURGERY     • OTHER SURGICAL HISTORY      had abox implaned to help stimulate the vocal cord surgery   • SUBTOTAL HYSTERECTOMY     • VOCAL CORD INJECTION         Family History   Problem Relation Age of Onset   • Hodgkin's lymphoma Mother    • Heart disease Father    • COPD Father    • Colon cancer Father    • Diabetes Sister    • Thyroid disease Sister    • Neuropathy Sister    • COPD Brother        Social History     Socioeconomic History   • Marital status:    Tobacco Use   • Smoking status: Never Smoker   • Smokeless tobacco: Never Used   Vaping Use   • Vaping Use: Never used   Substance and Sexual Activity   • Alcohol use: No   • Drug use: No   • Sexual activity: Not Currently       /79   Pulse 62   Temp 98.2 °F (36.8 °C) (Temporal)   Resp 20   Ht 162.6 cm (64\")   Wt 85.3 kg (188 lb 0.8 oz)   SpO2 96%   BMI 32.28 kg/m²       Objective   Physical Exam  Vitals and nursing note reviewed.   Constitutional:       Appearance: She is well-developed.   HENT:      Head: Normocephalic and atraumatic.   Eyes:      Pupils: Pupils are equal, round, and reactive to light.   Cardiovascular:      Rate and Rhythm: Normal rate and regular rhythm.      Heart sounds: Normal heart sounds.   Pulmonary:      Effort: Pulmonary effort is normal. No respiratory distress.      Breath sounds: Normal breath sounds.   Abdominal:      General: Bowel sounds are normal.      Palpations: Abdomen is soft.      Tenderness: There is no abdominal tenderness.   Musculoskeletal:         General: Normal range of motion.      Cervical back: Normal range of motion and neck supple.   Skin:     General: Skin is warm and dry.   Neurological:      General: No focal deficit present.      Mental Status: She is alert and oriented to person, place, and time.         Procedures           ED Course      My interpretation of EKG shows sinus rhythm, rate of 70, no ST elevation     Results for orders placed or performed during the hospital " encounter of 02/01/22   Basic Metabolic Panel    Specimen: Blood   Result Value Ref Range    Glucose 96 65 - 99 mg/dL    BUN 13 8 - 23 mg/dL    Creatinine 0.71 0.57 - 1.00 mg/dL    Sodium 142 136 - 145 mmol/L    Potassium 3.9 3.5 - 5.2 mmol/L    Chloride 102 98 - 107 mmol/L    CO2 25.0 22.0 - 29.0 mmol/L    Calcium 10.0 8.6 - 10.5 mg/dL    eGFR Non African Amer 82 >60 mL/min/1.73    BUN/Creatinine Ratio 18.3 7.0 - 25.0    Anion Gap 15.0 5.0 - 15.0 mmol/L   Protime-INR    Specimen: Blood   Result Value Ref Range    Protime 11.0 9.6 - 11.7 Seconds    INR 0.99 0.93 - 1.10   aPTT    Specimen: Blood   Result Value Ref Range    PTT 26.9 24.0 - 31.0 seconds   Troponin    Specimen: Blood   Result Value Ref Range    Troponin T <0.010 0.000 - 0.030 ng/mL   CBC Auto Differential    Specimen: Blood   Result Value Ref Range    WBC 10.30 3.40 - 10.80 10*3/mm3    RBC 4.94 3.77 - 5.28 10*6/mm3    Hemoglobin 15.4 12.0 - 15.9 g/dL    Hematocrit 45.0 34.0 - 46.6 %    MCV 91.1 79.0 - 97.0 fL    MCH 31.2 26.6 - 33.0 pg    MCHC 34.2 31.5 - 35.7 g/dL    RDW 13.3 12.3 - 15.4 %    RDW-SD 42.4 37.0 - 54.0 fl    MPV 6.8 6.0 - 12.0 fL    Platelets 305 140 - 450 10*3/mm3    Neutrophil % 70.3 42.7 - 76.0 %    Lymphocyte % 20.4 19.6 - 45.3 %    Monocyte % 6.6 5.0 - 12.0 %    Eosinophil % 2.0 0.3 - 6.2 %    Basophil % 0.7 0.0 - 1.5 %    Neutrophils, Absolute 7.30 (H) 1.70 - 7.00 10*3/mm3    Lymphocytes, Absolute 2.10 0.70 - 3.10 10*3/mm3    Monocytes, Absolute 0.70 0.10 - 0.90 10*3/mm3    Eosinophils, Absolute 0.20 0.00 - 0.40 10*3/mm3    Basophils, Absolute 0.10 0.00 - 0.20 10*3/mm3    nRBC 0.0 0.0 - 0.2 /100 WBC   ECG 12 Lead   Result Value Ref Range    QT Interval 402 ms                                           MDM   Patient had the above evaluation.  Results were discussed with the patient.  Patient remained well-appearing in the emergency room.  Work-up has been unremarkable so far.  EKG shows no acute ischemia.  Troponin is negative.  Chest  x-ray 4 days ago was normal.  Patient does have risk factors for heart disease including hypertension and family history of heart disease.  Heart score is moderate risk.  Patient will be placed in observation for further cardiac monitoring, serial troponins, stress test in the morning.  Patient is agreeable to this plan.      Final diagnoses:   Chest pain, unspecified type       ED Disposition  ED Disposition     ED Disposition Condition Comment    Decision to Admit            No follow-up provider specified.       Medication List      No changes were made to your prescriptions during this visit.          Zia Holcomb MD  02/01/22 [id number]

## 2022-02-02 ENCOUNTER — APPOINTMENT (OUTPATIENT)
Dept: NUCLEAR MEDICINE | Facility: HOSPITAL | Age: 72
End: 2022-02-02

## 2022-02-02 ENCOUNTER — APPOINTMENT (OUTPATIENT)
Dept: CARDIOLOGY | Facility: HOSPITAL | Age: 72
End: 2022-02-02

## 2022-02-02 PROBLEM — R94.39 ABNORMAL STRESS TEST: Status: ACTIVE | Noted: 2022-02-01

## 2022-02-02 PROBLEM — I20.0 UNSTABLE ANGINA (HCC): Status: ACTIVE | Noted: 2022-02-01

## 2022-02-02 LAB
ANION GAP SERPL CALCULATED.3IONS-SCNC: 12 MMOL/L (ref 5–15)
BASOPHILS # BLD AUTO: 0 10*3/MM3 (ref 0–0.2)
BASOPHILS NFR BLD AUTO: 0.5 % (ref 0–1.5)
BH CV ECHO MEAS - % IVS THICK: 62.6 %
BH CV ECHO MEAS - % LVPW THICK: 15.1 %
BH CV ECHO MEAS - ACS: 2 CM
BH CV ECHO MEAS - AO MAX PG (FULL): -0.56 MMHG
BH CV ECHO MEAS - AO MAX PG: 3.9 MMHG
BH CV ECHO MEAS - AO MEAN PG (FULL): 0.54 MMHG
BH CV ECHO MEAS - AO MEAN PG: 2.4 MMHG
BH CV ECHO MEAS - AO ROOT AREA (BSA CORRECTED): 1.6
BH CV ECHO MEAS - AO ROOT AREA: 7.1 CM^2
BH CV ECHO MEAS - AO ROOT DIAM: 3 CM
BH CV ECHO MEAS - AO V2 MAX: 99.3 CM/SEC
BH CV ECHO MEAS - AO V2 MEAN: 73.5 CM/SEC
BH CV ECHO MEAS - AO V2 VTI: 20.6 CM
BH CV ECHO MEAS - AVA(I,A): 3.1 CM^2
BH CV ECHO MEAS - AVA(I,D): 3.1 CM^2
BH CV ECHO MEAS - AVA(V,A): 3 CM^2
BH CV ECHO MEAS - AVA(V,D): 3 CM^2
BH CV ECHO MEAS - BSA(HAYCOCK): 2 M^2
BH CV ECHO MEAS - BSA: 1.9 M^2
BH CV ECHO MEAS - BZI_BMI: 32.3 KILOGRAMS/M^2
BH CV ECHO MEAS - BZI_METRIC_HEIGHT: 162.6 CM
BH CV ECHO MEAS - BZI_METRIC_WEIGHT: 85.3 KG
BH CV ECHO MEAS - EDV(CUBED): 114.6 ML
BH CV ECHO MEAS - EDV(MOD-SP4): 66.8 ML
BH CV ECHO MEAS - EDV(TEICH): 110.5 ML
BH CV ECHO MEAS - EF(CUBED): 87.8 %
BH CV ECHO MEAS - EF(MOD-BP): 67 %
BH CV ECHO MEAS - EF(MOD-SP4): 67.2 %
BH CV ECHO MEAS - EF(TEICH): 81.6 %
BH CV ECHO MEAS - ESV(CUBED): 14 ML
BH CV ECHO MEAS - ESV(MOD-SP4): 21.9 ML
BH CV ECHO MEAS - ESV(TEICH): 20.3 ML
BH CV ECHO MEAS - FS: 50.4 %
BH CV ECHO MEAS - IVS/LVPW: 0.85
BH CV ECHO MEAS - IVSD: 0.96 CM
BH CV ECHO MEAS - IVSS: 1.6 CM
BH CV ECHO MEAS - LA DIMENSION(2D): 3.8 CM
BH CV ECHO MEAS - LV DIASTOLIC VOL/BSA (35-75): 35.1 ML/M^2
BH CV ECHO MEAS - LV MASS(C)D: 184.1 GRAMS
BH CV ECHO MEAS - LV MASS(C)DI: 96.6 GRAMS/M^2
BH CV ECHO MEAS - LV MASS(C)S: 110.6 GRAMS
BH CV ECHO MEAS - LV MASS(C)SI: 58 GRAMS/M^2
BH CV ECHO MEAS - LV MAX PG: 4.5 MMHG
BH CV ECHO MEAS - LV MEAN PG: 1.9 MMHG
BH CV ECHO MEAS - LV SYSTOLIC VOL/BSA (12-30): 11.5 ML/M^2
BH CV ECHO MEAS - LV V1 MAX: 106.1 CM/SEC
BH CV ECHO MEAS - LV V1 MEAN: 64 CM/SEC
BH CV ECHO MEAS - LV V1 VTI: 22.4 CM
BH CV ECHO MEAS - LVIDD: 4.9 CM
BH CV ECHO MEAS - LVIDS: 2.4 CM
BH CV ECHO MEAS - LVOT AREA: 2.8 CM^2
BH CV ECHO MEAS - LVOT DIAM: 1.9 CM
BH CV ECHO MEAS - LVPWD: 1.1 CM
BH CV ECHO MEAS - LVPWS: 1.3 CM
BH CV ECHO MEAS - MV A MAX VEL: 53 CM/SEC
BH CV ECHO MEAS - MV DEC SLOPE: 281.9 CM/SEC^2
BH CV ECHO MEAS - MV DEC TIME: 0.28 SEC
BH CV ECHO MEAS - MV E MAX VEL: 39.3 CM/SEC
BH CV ECHO MEAS - MV E/A: 0.74
BH CV ECHO MEAS - MV MAX PG: 5.6 MMHG
BH CV ECHO MEAS - MV MEAN PG: 1.9 MMHG
BH CV ECHO MEAS - MV V2 MAX: 117.9 CM/SEC
BH CV ECHO MEAS - MV V2 MEAN: 62.4 CM/SEC
BH CV ECHO MEAS - MV V2 VTI: 28.7 CM
BH CV ECHO MEAS - MVA(VTI): 2.2 CM^2
BH CV ECHO MEAS - PA ACC TIME: 0.11 SEC
BH CV ECHO MEAS - PA MAX PG (FULL): 2.1 MMHG
BH CV ECHO MEAS - PA MAX PG: 5.4 MMHG
BH CV ECHO MEAS - PA MEAN PG (FULL): 1.3 MMHG
BH CV ECHO MEAS - PA MEAN PG: 3 MMHG
BH CV ECHO MEAS - PA PR(ACCEL): 30.7 MMHG
BH CV ECHO MEAS - PA V2 MAX: 115.9 CM/SEC
BH CV ECHO MEAS - PA V2 MEAN: 81.2 CM/SEC
BH CV ECHO MEAS - PA V2 VTI: 22.8 CM
BH CV ECHO MEAS - PULM A REVS DUR: 0.11 SEC
BH CV ECHO MEAS - PULM A REVS VEL: 39.1 CM/SEC
BH CV ECHO MEAS - PULM DIAS VEL: 38.2 CM/SEC
BH CV ECHO MEAS - PULM S/D: 1.8
BH CV ECHO MEAS - PULM SYS VEL: 67.8 CM/SEC
BH CV ECHO MEAS - RAP SYSTOLE: 3 MMHG
BH CV ECHO MEAS - RV MAX PG: 3.3 MMHG
BH CV ECHO MEAS - RV MEAN PG: 1.6 MMHG
BH CV ECHO MEAS - RV V1 MAX: 90.5 CM/SEC
BH CV ECHO MEAS - RV V1 MEAN: 59.3 CM/SEC
BH CV ECHO MEAS - RV V1 VTI: 19.3 CM
BH CV ECHO MEAS - RVDD: 2.9 CM
BH CV ECHO MEAS - RVSP: 30.3 MMHG
BH CV ECHO MEAS - SI(AO): 76.5 ML/M^2
BH CV ECHO MEAS - SI(CUBED): 52.8 ML/M^2
BH CV ECHO MEAS - SI(LVOT): 33 ML/M^2
BH CV ECHO MEAS - SI(MOD-SP4): 23.6 ML/M^2
BH CV ECHO MEAS - SI(TEICH): 47.3 ML/M^2
BH CV ECHO MEAS - SV(AO): 145.7 ML
BH CV ECHO MEAS - SV(CUBED): 100.6 ML
BH CV ECHO MEAS - SV(LVOT): 62.9 ML
BH CV ECHO MEAS - SV(MOD-SP4): 44.9 ML
BH CV ECHO MEAS - SV(TEICH): 90.2 ML
BH CV ECHO MEAS - TR MAX VEL: 259.1 CM/SEC
BH CV NUCLEAR PRIOR STUDY: 3
BH CV REST NUCLEAR ISOTOPE DOSE: 7.5 MCI
BH CV STRESS BP STAGE 1: NORMAL
BH CV STRESS COMMENTS STAGE 1: NORMAL
BH CV STRESS DOSE REGADENOSON STAGE 1: 0.4
BH CV STRESS DURATION MIN STAGE 1: 0
BH CV STRESS DURATION SEC STAGE 1: 10
BH CV STRESS HR STAGE 1: 75
BH CV STRESS NUCLEAR ISOTOPE DOSE: 21.7 MCI
BH CV STRESS PROTOCOL 1: NORMAL
BH CV STRESS RECOVERY BP: NORMAL MMHG
BH CV STRESS RECOVERY HR: 76 BPM
BH CV STRESS STAGE 1: 1
BUN SERPL-MCNC: 11 MG/DL (ref 8–23)
BUN/CREAT SERPL: 13.4 (ref 7–25)
CALCIUM SPEC-SCNC: 9.6 MG/DL (ref 8.6–10.5)
CHLORIDE SERPL-SCNC: 102 MMOL/L (ref 98–107)
CO2 SERPL-SCNC: 28 MMOL/L (ref 22–29)
CREAT SERPL-MCNC: 0.82 MG/DL (ref 0.57–1)
DEPRECATED RDW RBC AUTO: 42.4 FL (ref 37–54)
EOSINOPHIL # BLD AUTO: 0.2 10*3/MM3 (ref 0–0.4)
EOSINOPHIL NFR BLD AUTO: 2.5 % (ref 0.3–6.2)
ERYTHROCYTE [DISTWIDTH] IN BLOOD BY AUTOMATED COUNT: 13.1 % (ref 12.3–15.4)
GFR SERPL CREATININE-BSD FRML MDRD: 69 ML/MIN/1.73
GLUCOSE SERPL-MCNC: 94 MG/DL (ref 65–99)
HCT VFR BLD AUTO: 40.7 % (ref 34–46.6)
HGB BLD-MCNC: 13.8 G/DL (ref 12–15.9)
LYMPHOCYTES # BLD AUTO: 2.6 10*3/MM3 (ref 0.7–3.1)
LYMPHOCYTES NFR BLD AUTO: 28.4 % (ref 19.6–45.3)
MAXIMAL PREDICTED HEART RATE: 150 BPM
MAXIMAL PREDICTED HEART RATE: 150 BPM
MCH RBC QN AUTO: 30.9 PG (ref 26.6–33)
MCHC RBC AUTO-ENTMCNC: 34 G/DL (ref 31.5–35.7)
MCV RBC AUTO: 91 FL (ref 79–97)
MONOCYTES # BLD AUTO: 0.7 10*3/MM3 (ref 0.1–0.9)
MONOCYTES NFR BLD AUTO: 7.8 % (ref 5–12)
NEUTROPHILS NFR BLD AUTO: 5.6 10*3/MM3 (ref 1.7–7)
NEUTROPHILS NFR BLD AUTO: 60.8 % (ref 42.7–76)
NRBC BLD AUTO-RTO: 0 /100 WBC (ref 0–0.2)
PERCENT MAX PREDICTED HR: 61.33 %
PLATELET # BLD AUTO: 270 10*3/MM3 (ref 140–450)
PMV BLD AUTO: 6.7 FL (ref 6–12)
POTASSIUM SERPL-SCNC: 3.8 MMOL/L (ref 3.5–5.2)
RBC # BLD AUTO: 4.47 10*6/MM3 (ref 3.77–5.28)
SARS-COV-2 RNA PNL SPEC NAA+PROBE: NOT DETECTED
SODIUM SERPL-SCNC: 142 MMOL/L (ref 136–145)
STRESS BASELINE BP: NORMAL MMHG
STRESS BASELINE HR: 62 BPM
STRESS PERCENT HR: 72 %
STRESS POST PEAK BP: NORMAL MMHG
STRESS POST PEAK HR: 92 BPM
STRESS TARGET HR: 128 BPM
STRESS TARGET HR: 128 BPM
TROPONIN T SERPL-MCNC: <0.01 NG/ML (ref 0–0.03)
WBC NRBC COR # BLD: 9.2 10*3/MM3 (ref 3.4–10.8)

## 2022-02-02 PROCEDURE — G0378 HOSPITAL OBSERVATION PER HR: HCPCS

## 2022-02-02 PROCEDURE — 25010000002 MIDAZOLAM PER 1 MG: Performed by: INTERNAL MEDICINE

## 2022-02-02 PROCEDURE — 99204 OFFICE O/P NEW MOD 45 MIN: CPT | Performed by: INTERNAL MEDICINE

## 2022-02-02 PROCEDURE — 93458 L HRT ARTERY/VENTRICLE ANGIO: CPT | Performed by: INTERNAL MEDICINE

## 2022-02-02 PROCEDURE — 99152 MOD SED SAME PHYS/QHP 5/>YRS: CPT | Performed by: INTERNAL MEDICINE

## 2022-02-02 PROCEDURE — 93306 TTE W/DOPPLER COMPLETE: CPT | Performed by: INTERNAL MEDICINE

## 2022-02-02 PROCEDURE — C1769 GUIDE WIRE: HCPCS | Performed by: INTERNAL MEDICINE

## 2022-02-02 PROCEDURE — C1760 CLOSURE DEV, VASC: HCPCS | Performed by: INTERNAL MEDICINE

## 2022-02-02 PROCEDURE — 93018 CV STRESS TEST I&R ONLY: CPT | Performed by: INTERNAL MEDICINE

## 2022-02-02 PROCEDURE — 93306 TTE W/DOPPLER COMPLETE: CPT

## 2022-02-02 PROCEDURE — 84484 ASSAY OF TROPONIN QUANT: CPT | Performed by: EMERGENCY MEDICINE

## 2022-02-02 PROCEDURE — 0 TECHNETIUM TETROFOSMIN KIT: Performed by: EMERGENCY MEDICINE

## 2022-02-02 PROCEDURE — 25010000002 FENTANYL CITRATE (PF) 100 MCG/2ML SOLUTION: Performed by: INTERNAL MEDICINE

## 2022-02-02 PROCEDURE — 25010000002 REGADENOSON 0.4 MG/5ML SOLUTION: Performed by: EMERGENCY MEDICINE

## 2022-02-02 PROCEDURE — C1894 INTRO/SHEATH, NON-LASER: HCPCS | Performed by: INTERNAL MEDICINE

## 2022-02-02 PROCEDURE — 0 IOPAMIDOL PER 1 ML: Performed by: INTERNAL MEDICINE

## 2022-02-02 PROCEDURE — 78452 HT MUSCLE IMAGE SPECT MULT: CPT | Performed by: INTERNAL MEDICINE

## 2022-02-02 PROCEDURE — 87635 SARS-COV-2 COVID-19 AMP PRB: CPT | Performed by: NURSE PRACTITIONER

## 2022-02-02 PROCEDURE — 78452 HT MUSCLE IMAGE SPECT MULT: CPT

## 2022-02-02 PROCEDURE — 93017 CV STRESS TEST TRACING ONLY: CPT

## 2022-02-02 PROCEDURE — A9502 TC99M TETROFOSMIN: HCPCS | Performed by: EMERGENCY MEDICINE

## 2022-02-02 RX ORDER — ASPIRIN 81 MG/1
81 TABLET ORAL DAILY
Status: DISCONTINUED | OUTPATIENT
Start: 2022-02-02 | End: 2022-02-02

## 2022-02-02 RX ORDER — MIDAZOLAM HYDROCHLORIDE 1 MG/ML
INJECTION INTRAMUSCULAR; INTRAVENOUS AS NEEDED
Status: DISCONTINUED | OUTPATIENT
Start: 2022-02-02 | End: 2022-02-02 | Stop reason: HOSPADM

## 2022-02-02 RX ORDER — ASCORBIC ACID 500 MG
500 TABLET ORAL DAILY
Status: DISCONTINUED | OUTPATIENT
Start: 2022-02-02 | End: 2022-02-03 | Stop reason: HOSPADM

## 2022-02-02 RX ORDER — ESCITALOPRAM OXALATE 10 MG/1
10 TABLET ORAL DAILY
Status: DISCONTINUED | OUTPATIENT
Start: 2022-02-02 | End: 2022-02-03 | Stop reason: HOSPADM

## 2022-02-02 RX ORDER — SODIUM CHLORIDE 9 MG/ML
100 INJECTION, SOLUTION INTRAVENOUS CONTINUOUS
Status: DISCONTINUED | OUTPATIENT
Start: 2022-02-02 | End: 2022-02-03 | Stop reason: HOSPADM

## 2022-02-02 RX ORDER — AMOXICILLIN 250 MG/1
1000 CAPSULE ORAL 3 TIMES DAILY
Status: DISCONTINUED | OUTPATIENT
Start: 2022-02-02 | End: 2022-02-03 | Stop reason: HOSPADM

## 2022-02-02 RX ORDER — CETIRIZINE HYDROCHLORIDE 10 MG/1
10 TABLET ORAL DAILY
Status: DISCONTINUED | OUTPATIENT
Start: 2022-02-02 | End: 2022-02-03 | Stop reason: HOSPADM

## 2022-02-02 RX ORDER — FENTANYL CITRATE 50 UG/ML
INJECTION, SOLUTION INTRAMUSCULAR; INTRAVENOUS AS NEEDED
Status: DISCONTINUED | OUTPATIENT
Start: 2022-02-02 | End: 2022-02-02 | Stop reason: HOSPADM

## 2022-02-02 RX ORDER — PANTOPRAZOLE SODIUM 40 MG/1
40 TABLET, DELAYED RELEASE ORAL EVERY MORNING
Refills: 1 | Status: DISCONTINUED | OUTPATIENT
Start: 2022-02-03 | End: 2022-02-03 | Stop reason: HOSPADM

## 2022-02-02 RX ORDER — MONTELUKAST SODIUM 10 MG/1
10 TABLET ORAL NIGHTLY
Status: DISCONTINUED | OUTPATIENT
Start: 2022-02-02 | End: 2022-02-03 | Stop reason: HOSPADM

## 2022-02-02 RX ORDER — ASPIRIN 325 MG
325 TABLET ORAL DAILY
Status: DISCONTINUED | OUTPATIENT
Start: 2022-02-02 | End: 2022-02-03 | Stop reason: HOSPADM

## 2022-02-02 RX ORDER — SODIUM CHLORIDE 9 MG/ML
INJECTION, SOLUTION INTRAVENOUS CONTINUOUS PRN
Status: COMPLETED | OUTPATIENT
Start: 2022-02-02 | End: 2022-02-02

## 2022-02-02 RX ORDER — MELOXICAM 15 MG/1
15 TABLET ORAL DAILY
Status: DISCONTINUED | OUTPATIENT
Start: 2022-02-02 | End: 2022-02-03 | Stop reason: HOSPADM

## 2022-02-02 RX ORDER — ACETAMINOPHEN 325 MG/1
650 TABLET ORAL EVERY 4 HOURS PRN
Status: DISCONTINUED | OUTPATIENT
Start: 2022-02-02 | End: 2022-02-02 | Stop reason: SDUPTHER

## 2022-02-02 RX ORDER — LORAZEPAM 1 MG/1
1 TABLET ORAL 2 TIMES DAILY PRN
Status: DISCONTINUED | OUTPATIENT
Start: 2022-02-02 | End: 2022-02-03 | Stop reason: HOSPADM

## 2022-02-02 RX ORDER — LIDOCAINE HYDROCHLORIDE 20 MG/ML
INJECTION, SOLUTION INFILTRATION; PERINEURAL AS NEEDED
Status: DISCONTINUED | OUTPATIENT
Start: 2022-02-02 | End: 2022-02-02 | Stop reason: HOSPADM

## 2022-02-02 RX ADMIN — CETIRIZINE HYDROCHLORIDE 10 MG: 10 TABLET, FILM COATED ORAL at 14:25

## 2022-02-02 RX ADMIN — ESCITALOPRAM OXALATE 10 MG: 10 TABLET ORAL at 14:25

## 2022-02-02 RX ADMIN — AMOXICILLIN 1000 MG: 250 CAPSULE ORAL at 18:39

## 2022-02-02 RX ADMIN — ASPIRIN 325 MG ORAL TABLET 325 MG: 325 PILL ORAL at 14:25

## 2022-02-02 RX ADMIN — REGADENOSON 0.4 MG: 0.08 INJECTION, SOLUTION INTRAVENOUS at 07:45

## 2022-02-02 RX ADMIN — TETROFOSMIN 1 DOSE: 1.38 INJECTION, POWDER, LYOPHILIZED, FOR SOLUTION INTRAVENOUS at 06:51

## 2022-02-02 RX ADMIN — OXYCODONE HYDROCHLORIDE AND ACETAMINOPHEN 500 MG: 500 TABLET ORAL at 14:25

## 2022-02-02 RX ADMIN — TETROFOSMIN 1 DOSE: 1.38 INJECTION, POWDER, LYOPHILIZED, FOR SOLUTION INTRAVENOUS at 07:45

## 2022-02-02 RX ADMIN — MELOXICAM 15 MG: 15 TABLET ORAL at 14:25

## 2022-02-02 NOTE — PLAN OF CARE
Goal Outcome Evaluation:  Plan of Care Reviewed With: patient        Progress: improving  Outcome Summary: Pt had abnormal stress test.  Going for heart cath today.  VSS.  No c/o chest pain.  Will continue to monitor.

## 2022-02-02 NOTE — CONSULTS
"    Cardiology Consult Note    Patient Identification:  Name: Kourtney Siddiqi  Age: 72 y.o.  Sex: female  :  1950  MRN: [id number]             Requesting Physician :  JIN Whyte     Reason for Consultation / Chief Complaint : patient co-management   Unstable angina, abnormal stress test     History of Present Illness:     This is a 72 year old female with PMH     - hypertension   - COVID 19 (3 weeks ago and last year)  - diverticulitis, anxiety/depression, GERD   - vocal cord paralysis s/p surgery   - previous history of cholecystectomy, hysterectomy,  Bladder suspension   - family history of CAD/CABG in father  - non smoker   - Allergy to hydrocodone     Who presented to the ED with chest pain.  Patient reports over the last several weeks and even months she has had intermittent chest pain with exertion, as well as shortness of breath that improves with rest.  She states that it is not \"excuriating\" pain, more of a discomfort.  Yesterday the pain became worse therefore she came to the ED. She also states that she is has had intermittent pain into her left neck and jaw area. Patient has seen her PCP recently for this and had been referred to Dr. Rawls with appointment scheduled for Friday.  Patient's labs reviewed and showed negative serial cardiac enzymes, BMP and CBC were unremarkable. EKG showed normal sinus rhythm with possible old infarct.  Patient underwent stress test this morning that was abnormal.     We will stat aspirin 81mg daily  Check echocardiogram to assess LV function and valve abnormalities given patient's ARMENDARIZ.    Schedule cardiac catheterization later this evening.   Check lipid panel in am.       Further assessment and Plan per Dr. Rawls   Electronically signed by KARLEE tSroud, 22, 12:34 PM EST.    Cardiology attending addendum :    I have personally performed a face-to-face diagnostic evaluation, physical exam and reviewed data on this patient.  I have " reviewed documentation done by me and nurse practitioner Marianela Frost and corrected as needed.  And agree with the different components of documentation.  This is a 72-year-old with previous history of hypertension, cholecystectomy hysterectomy, positive family history of CAD CABG in father allergy to hydrocodone, vocal cord paralysis surgery and recent COVID-19 infection 3 weeks ago presented with complaint of chest pain patient was supposed to see me in the office in the meantime called with worsening chest pain advised her to go to the ER.  Patient has been having recurrent substernal chest pain associated with shortness of breath relieved with rest.  Had radiation to the neck, had palpitations and fatigue.  All other 14 point review of systems reviewed were negative..  EKG done 2/1/2022 reviewed/interpreted by me reveals sinus rhythm with rate of 72 bpm      Assessment:  :    CAD, unstable angina  Abnormal stress test  Hypertension  Family history of heart disease in father  Hyperglycemia, 135, A1c 6.2 on 7/20/2021 consistent with prediabetes  Obesity with BMI over 32  Hepatic steatosis by ultrasound dated 3/21  Status post cholecystectomy, hysterectomy  Allergies/intolerance to hydrocodone          Recommendations / Plan:        Telemetry to monitor rhythm  Serial troponins are negative.  Patient underwent stress test which was abnormal revealing lateral ischemia.  Will give patient aspirin, amlodipine.  Will schedule for cardiac cath risk benefits alternatives explained to patient and his wife.  Depending on coronary arteriogram will start patient on beta-blockers and statins as tolerated.  Reviewed BMI over 30 counseled on weight loss diet and exercise.  We will check an echocardiogram to assess patient's dyspnea.  We will follow and consider further evaluation treatment.                 Diagnosis Plan   1. Unstable angina (HCC)  Case Request Cath Lab: Left Heart Cath    Case Request Cath Lab: Left Heart Cath     Cardiac Catheterization/Vascular Study    Cardiac Catheterization/Vascular Study   2. Chest pain, unspecified type     3. Abnormal stress test  Case Request Cath Lab: Left Heart Cath    Case Request Cath Lab: Left Heart Cath    Cardiac Catheterization/Vascular Study    Cardiac Catheterization/Vascular Study   4. Primary hypertension  Case Request Cath Lab: Left Heart Cath    Case Request Cath Lab: Left Heart Cath    Cardiac Catheterization/Vascular Study    Cardiac Catheterization/Vascular Study              Past Medical History:  Past Medical History:   Diagnosis Date   • Acute recurrent maxillary sinusitis    • Allergic    • Anxiety    • Arthritis    • Colon polyp    • Depression    • Diverticulitis    • Diverticulosis    • Dysphonia    • Environmental allergies    • GERD (gastroesophageal reflux disease)    • Headache    • History of medical problems    • HL (hearing loss)    • Hyperlipidemia    • Hypertension    • Insomnia    • Vocal cord paralysis     follows w/ ENT- [facility]     Past Surgical History:  Past Surgical History:   Procedure Laterality Date   • BLADDER REPAIR     • BLADDER SUSPENSION     • CHOLECYSTECTOMY     • GALLBLADDER SURGERY     • LARYNX SURGERY     • OTHER SURGICAL HISTORY      had abox implaned to help stimulate the vocal cord surgery   • SUBTOTAL HYSTERECTOMY     • VOCAL CORD INJECTION        Allergies:  Allergies   Allergen Reactions   • Hydrocodone Mental Status Change     Home Meds:  Medications Prior to Admission   Medication Sig Dispense Refill Last Dose   • amLODIPine-benazepril (LOTREL 5-10) 5-10 MG per capsule TAKE 1 CAPSULE BY MOUTH EVERY DAY 90 capsule 1 1/31/2022 at Unknown time   • amoxicillin (AMOXIL) 500 MG capsule Take 2 capsules by mouth 3 (Three) Times a Day. 60 capsule 0 2/1/2022 at Unknown time   • ascorbic acid (VITAMIN C) 500 MG tablet Take 1 tablet by mouth Daily.   2/1/2022 at Unknown time   • aspirin 325 MG tablet Take 325 mg by mouth Daily.   2/1/2022 at Unknown  time   • Calcium Carbonate-Vit D-Min (Calcium 600+D3 Plus Minerals) 600-800 MG-UNIT tablet Take 1 capsule by mouth Daily.   2/1/2022 at Unknown time   • escitalopram (LEXAPRO) 10 MG tablet TAKE 1 TABLET BY MOUTH EVERY DAY 90 tablet 1 2/1/2022 at Unknown time   • levocetirizine (XYZAL) 5 MG tablet    1/31/2022 at Unknown time   • LORazepam (ATIVAN) 1 MG tablet TAKE 1 TABLET BY MOUTH TWICE DAILY AS NEEDED FOR ANXIETY OR SLEEP 60 tablet 2 1/31/2022 at Unknown time   • meloxicam (MOBIC) 15 MG tablet TAKE 1 TABLET BY MOUTH DAILY 90 tablet 1 2/1/2022 at Unknown time   • montelukast (SINGULAIR) 10 MG tablet TAKE 1 TABLET BY MOUTH EVERY NIGHT 90 tablet 1 2/1/2022 at Unknown time   • multivitamin (THERAGRAN) tablet tablet Take 1 tablet by mouth.   2/1/2022 at Unknown time   • Omega-3 Fatty Acids (Fish Oil Extra Strength) 435 MG capsule    2/1/2022 at Unknown time   • omeprazole (priLOSEC) 40 MG capsule TAKE 1 CAPSULE BY MOUTH DAILY 90 capsule 1 1/31/2022 at Unknown time   • traZODone (DESYREL) 100 MG tablet TAKE 1/2 TABLET BY MOUTH NIGHTLY FOR THE FIRST WEEK THEN INCREASE TO 1 TABLET AT NIGHT 90 tablet 1 1/31/2022 at Unknown time     Current Meds:     Current Facility-Administered Medications:   •  acetaminophen (TYLENOL) tablet 650 mg, 650 mg, Oral, Q4H JUANITON, Zia Holcomb MD  •  amLODIPine (NORVASC) 5 mg, lisinopril (PRINIVIL,ZESTRIL) 10 mg, , Oral, Q24H, Davey Velazquez PA-C  •  amoxicillin (AMOXIL) capsule 1,000 mg, 1,000 mg, Oral, TID, Davey Velazquez PA-C  •  ascorbic acid (VITAMIN C) tablet 500 mg, 500 mg, Oral, Daily, Davey Velazquez PA-C, 500 mg at 02/02/22 1425  •  aspirin tablet 325 mg, 325 mg, Oral, Daily, Davey Velazquez PA-C, 325 mg at 02/02/22 1425  •  cetirizine (zyrTEC) tablet 10 mg, 10 mg, Oral, Daily, Davey Velazquez PA-C, 10 mg at 02/02/22 1425  •  escitalopram (LEXAPRO) tablet 10 mg, 10 mg, Oral, Daily, Davey Velazquez PA-C, 10 mg at 02/02/22 1425  •  LORazepam (ATIVAN)  tablet 1 mg, 1 mg, Oral, BID PRN, Davey Velazquez PA-C  •  melatonin tablet 5 mg, 5 mg, Oral, Nightly PRN, Zia Holcomb MD  •  meloxicam (MOBIC) tablet 15 mg, 15 mg, Oral, Daily, Davey Velazquez PA-C, 15 mg at 02/02/22 1425  •  montelukast (SINGULAIR) tablet 10 mg, 10 mg, Oral, Nightly, Davey Velazquez PA-C  •  Morphine sulfate (PF) injection 1 mg, 1 mg, Intravenous, Q4H PRN **AND** naloxone (NARCAN) injection 0.4 mg, 0.4 mg, Intravenous, Q5 Min PRN, Zia Holcomb MD  •  nitroglycerin (NITROSTAT) SL tablet 0.4 mg, 0.4 mg, Sublingual, Q5 Min PRN, iZa Holcomb MD  •  ondansetron (ZOFRAN) injection 4 mg, 4 mg, Intravenous, Q6H PRN, Zia Holcomb MD  •  [START ON 2/3/2022] pantoprazole (PROTONIX) EC tablet 40 mg, 40 mg, Oral, QAM, Davey Velazquez PA-C  •  sodium chloride 0.9 % flush 10 mL, 10 mL, Intravenous, PRN, Zia Holcomb MD  •  [COMPLETED] Insert peripheral IV, , , Once **AND** sodium chloride 0.9 % flush 10 mL, 10 mL, Intravenous, PRN, Zia Holcomb MD, 10 mL at 02/01/22 2207  Social History:   Social History     Tobacco Use   • Smoking status: Never Smoker   • Smokeless tobacco: Never Used   Substance Use Topics   • Alcohol use: No      Family History:  Family History   Problem Relation Age of Onset   • Hodgkin's lymphoma Mother    • Heart disease Father    • COPD Father    • Colon cancer Father    • Diabetes Sister    • Thyroid disease Sister    • Neuropathy Sister    • COPD Brother         Review of Systems : Review of Systems   Constitutional: Positive for malaise/fatigue.   Cardiovascular: Positive for chest pain, dyspnea on exertion and palpitations. Negative for leg swelling.   Respiratory: Positive for shortness of breath. Negative for cough.    Musculoskeletal: Positive for neck pain.   Gastrointestinal: Negative for abdominal pain, nausea and vomiting.   Neurological: Positive for headaches. Negative for dizziness, focal weakness and light-headedness.   All other  "systems reviewed and are negative.       Constitutional:  Temp:  [97.5 °F (36.4 °C)-98.2 °F (36.8 °C)] 98 °F (36.7 °C)  Heart Rate:  [59-67] 65  Resp:  [18] 18  BP: (128-163)/(77-87) 131/83    Physical Exam   /83 (BP Location: Right arm, Patient Position: Lying)   Pulse 65   Temp 98 °F (36.7 °C) (Oral)   Resp 18   Ht 162.6 cm (64\")   Wt 85.3 kg (188 lb 0.8 oz)   SpO2 95%   BMI 32.28 kg/m²         Physical Exam   /83 (BP Location: Right arm, Patient Position: Lying)   Pulse 65   Temp 98 °F (36.7 °C) (Oral)   Resp 18   Ht 162.6 cm (64\")   Wt 85.3 kg (188 lb 0.8 oz)   SpO2 95%   BMI 32.28 kg/m²   General:  Appears in no acute distress, pleasant obese  Eyes: Sclera is anicteric,  conjunctiva is clear   HEENT:  No JVD. Thyroid not visibly enlarged. No mucosal pallor or cyanosis  Respiratory: Respirations regular and unlabored at rest.  Bilaterally good breath sounds, with good air entry in all fields. No crackles, rubs or wheezes auscultated  Cardiovascular: S1,S2 Regular rate and rhythm. No murmur, rub or gallop auscultated.  . No pretibial pitting edema  Gastrointestinal: Abdomen nondistended, soft  Musculoskeletal:  No abnormal movements  Extremities: No digital clubbing or cyanosis  Skin: Color pink. Skin warm and dry to touch. No rashes  No xanthoma  Neuro: Alert and awake, no lateralizing deficits appreciated          Cardiographics  ECG: EKG tracing was  personally reviewed/interpreted by me  ECG 12 Lead   Preliminary Result   HEART RATE= 70  bpm   RR Interval= 856  ms   MA Interval= 159  ms   P Horizontal Axis= 0  deg   P Front Axis= 48  deg   QRSD Interval= 78  ms   QT Interval= 402  ms   QRS Axis= -25  deg   T Wave Axis= 30  deg   - ABNORMAL ECG -   Sinus rhythm   Inferior infarct, old   Electronically Signed By:    Date and Time of Study: 2022-02-01 13:55:42      SCANNED - TELEMETRY     Final Result          Telemetry: Sinus rhythm    Echocardiogram:       Imaging  Chest X-ray: "   Imaging Results (Last 24 Hours)     ** No results found for the last 24 hours. **          Lab Review: I have reviewed the labs  Results from last 7 days   Lab Units 02/02/22  0454 02/01/22  1813 02/01/22  1654   TROPONIN T ng/mL <0.010 <0.010 <0.010         Results from last 7 days   Lab Units 02/01/22  2317   SODIUM mmol/L 142   POTASSIUM mmol/L 3.8   BUN mg/dL 11   CREATININE mg/dL 0.82   CALCIUM mg/dL 9.6     @[id number]@  Results from last 7 days   Lab Units 02/01/22  2317 02/01/22  1654   WBC 10*3/mm3 9.20 10.30   HEMOGLOBIN g/dL 13.8 15.4   HEMATOCRIT % 40.7 45.0   PLATELETS 10*3/mm3 270 305     Results from last 7 days   Lab Units 02/01/22  1654   INR  0.99   APTT seconds 26.9             Dale Rawls MD  2/2/2022, 18:20 EST    Electronically signed by Dale Rawls MD, 02/02/22, 6:26 PM EST.    EMR Dragon/Transcription:   Dictated utilizing Dragon dictation  Much of the above report is an electronic transcription/translation of the spoken language to printed text using Dragon Software. As such, the subtleties and finesse of the spoken language may permit erroneous, or at times, nonsensical words or phrases to be inadvertently transcribed; thus changes may be made at a later date to rectify these errors.

## 2022-02-02 NOTE — CASE MANAGEMENT/SOCIAL WORK
Discharge Planning Assessment   [place]     Patient Name: Kourtney Siddiqi  MRN: [id number]  Today's Date: 2/2/2022    Admit Date: 2/1/2022     Discharge Needs Assessment    No documentation.                Discharge Plan     Row Name 02/02/22 7500       Plan    Plan Home    Patient/Family in Agreement with Plan yes    Plan Comments  spoke with patient. She confirms pcp and pharmacy. Reports she lives alone and is independent with ADLs. Denies use  of DME. Intends to  herself home.Reports her sister live next door to her  and  are able to help her if needed              Eugenia Quach RN, [facility]  Office: [phone]  Fax: [phone]  [email or web address]      I met with patient in room wearing PPE: mask and goggles.     Maintained distance greater than six feet and spent </=15 minutes in the room    Eugenia Quach RN

## 2022-02-02 NOTE — PLAN OF CARE
Goal Outcome Evaluation:  Plan of Care Reviewed With: patient        Progress: improving  Outcome Summary: New admission from ED with chest pain. Patient remains pain free at this time, no shortness of breath reported. Awaiting stress myoview today. VSS. Will monitor.

## 2022-02-02 NOTE — H&P
[facility] H&P    Patient Name: Kourtney Siddiqi  : 1950  MRN: [id number]  Primary Care Physician: Bert Shultz MD  Date of admission: 2022     Patient Care Team:  Bert Shultz MD as PCP - General (Internal Medicine)  Dale Rawls MD as Consulting Physician (Cardiology)          Subjective   History Present Illness     Chief Complaint:   Chief Complaint   Patient presents with   • Chest Pain     chest pain, tightness, soa, worse when up moving around.         Obtained from any provider HPI on 2022:  71-year-old female presents with chest pain.  Patient states the pain is in the center of her chest and sometimes radiates to her neck.  She has had some associated shortness of breath and palpitations.  Symptoms are worse when she is up moving around.  She states symptoms have been going on for a couple weeks.  She saw her primary doctor last week and had a chest x-ray on Thursday which was normal.  She is scheduled to see a cardiologist on Friday.  Symptoms became worse today so she came to the emergency room.  She denies any other alleviating or exacerbating factors.  She denies any pain currently.    2022: Patient Carlos HPI noted above including several week history of worsening intermittent left-sided chest pain described as a pressure which is made worse with exertion with some occasional dyspnea when symptoms are present.  Patient has noted some palpitations on occasion recently and also reports that approximately 2 months prior she went on a cruise where after carrying her and her friends luggage a long distance she experienced severe chest pain which lasted throughout the majority of the day before slowly resolving spontaneously.  She denies any nausea, vomiting, episodes of diaphoresis, cough or fever or sick contacts.  Patient does not smoke or drink alcohol.  Family history notable for father who had CAD with a CABG in his late sixties and a younger  sister with vascular disease and carotid stenosis      Review of Systems   Constitutional: Negative.   HENT: Negative.    Eyes: Negative.    Cardiovascular: Positive for chest pain, dyspnea on exertion and palpitations. Negative for irregular heartbeat, leg swelling, near-syncope, orthopnea and syncope.   Respiratory: Positive for shortness of breath.    Endocrine: Negative.    Skin: Negative.    Musculoskeletal: Negative.    Gastrointestinal: Negative.    Genitourinary: Negative.    Neurological: Negative.    Psychiatric/Behavioral: Negative.            Personal History     Past Medical History:   Past Medical History:   Diagnosis Date   • Acute recurrent maxillary sinusitis    • Allergic    • Anxiety    • Arthritis    • Colon polyp    • Depression    • Diverticulitis    • Diverticulosis    • Dysphonia    • Environmental allergies    • GERD (gastroesophageal reflux disease)    • Headache    • History of medical problems    • HL (hearing loss)    • Hyperlipidemia    • Hypertension    • Insomnia    • Vocal cord paralysis     follows w/ ENT- [facility]       Surgical History:      Past Surgical History:   Procedure Laterality Date   • BLADDER REPAIR     • BLADDER SUSPENSION     • CHOLECYSTECTOMY     • GALLBLADDER SURGERY     • LARYNX SURGERY     • OTHER SURGICAL HISTORY      had abox implaned to help stimulate the vocal cord surgery   • SUBTOTAL HYSTERECTOMY     • VOCAL CORD INJECTION             Family History: family history includes COPD in her brother and father; Colon cancer in her father; Diabetes in her sister; Heart disease in her father; Hodgkin's lymphoma in her mother; Neuropathy in her sister; Thyroid disease in her sister. Otherwise pertinent FHx was reviewed and unremarkable.     Social History:  reports that she has never smoked. She has never used smokeless tobacco. She reports that she does not drink alcohol and does not use drugs.      Medications:  Prior to Admission medications    Medication Sig  Start Date End Date Taking? Authorizing Provider   amLODIPine-benazepril (LOTREL 5-10) 5-10 MG per capsule TAKE 1 CAPSULE BY MOUTH EVERY DAY 10/28/21  Yes Bert Shultz MD   amoxicillin (AMOXIL) 500 MG capsule Take 2 capsules by mouth 3 (Three) Times a Day. 1/27/22  Yes Rupesh Garcia APRN   ascorbic acid (VITAMIN C) 500 MG tablet Take 1 tablet by mouth Daily.   Yes Lynda Castillo MD   aspirin 325 MG tablet Take 325 mg by mouth Daily.   Yes Lynda Castillo MD   Calcium Carbonate-Vit D-Min (Calcium 600+D3 Plus Minerals) 600-800 MG-UNIT tablet Take 1 capsule by mouth Daily.   Yes Lynda Castillo MD   escitalopram (LEXAPRO) 10 MG tablet TAKE 1 TABLET BY MOUTH EVERY DAY 9/21/21  Yes Bert Shultz MD   levocetirizine (XYZAL) 5 MG tablet  8/3/19  Yes Lynda Castillo MD   LORazepam (ATIVAN) 1 MG tablet TAKE 1 TABLET BY MOUTH TWICE DAILY AS NEEDED FOR ANXIETY OR SLEEP 11/4/21  Yes Bert Shultz MD   meloxicam (MOBIC) 15 MG tablet TAKE 1 TABLET BY MOUTH DAILY 10/28/21  Yes Bert Shultz MD   montelukast (SINGULAIR) 10 MG tablet TAKE 1 TABLET BY MOUTH EVERY NIGHT 10/28/21  Yes Bert Shultz MD   multivitamin (THERAGRAN) tablet tablet Take 1 tablet by mouth.   Yes Lynda Castillo MD   Omega-3 Fatty Acids (Fish Oil Extra Strength) 435 MG capsule    Yes Lynda Castillo MD   omeprazole (priLOSEC) 40 MG capsule TAKE 1 CAPSULE BY MOUTH DAILY 10/28/21  Yes Bert Shultz MD   traZODone (DESYREL) 100 MG tablet TAKE 1/2 TABLET BY MOUTH NIGHTLY FOR THE FIRST WEEK THEN INCREASE TO 1 TABLET AT NIGHT 10/28/21  Yes Bert Shultz MD       Allergies:    Allergies   Allergen Reactions   • Hydrocodone Mental Status Change       Objective   Objective     Vital Signs  Temp:  [97.5 °F (36.4 °C)-98.2 °F (36.8 °C)] 98.2 °F (36.8 °C)  Heart Rate:  [59-77] 63  Resp:  [18-20] 18  BP: (128-163)/(77-92) 137/84  SpO2:  [94 %-98 %] 96 %  on   ;    Device (Oxygen Therapy): room air  Body mass index is 32.28 kg/m².    Physical Exam  Vitals reviewed.   Constitutional:       General: She is not in acute distress.     Appearance: Normal appearance. She is obese. She is not ill-appearing, toxic-appearing or diaphoretic.   HENT:      Head: Normocephalic and atraumatic.      Right Ear: External ear normal.      Left Ear: External ear normal.      Nose: Nose normal.      Mouth/Throat:      Mouth: Mucous membranes are moist.   Eyes:      Extraocular Movements: Extraocular movements intact.   Cardiovascular:      Rate and Rhythm: Normal rate and regular rhythm.      Pulses: Normal pulses.   Pulmonary:      Effort: Pulmonary effort is normal.      Breath sounds: Normal breath sounds.   Abdominal:      General: Bowel sounds are normal. There is no distension.      Palpations: Abdomen is soft.      Tenderness: There is no abdominal tenderness.   Musculoskeletal:         General: Normal range of motion.      Cervical back: Normal range of motion.   Skin:     General: Skin is warm and dry.      Capillary Refill: Capillary refill takes less than 2 seconds.   Neurological:      General: No focal deficit present.      Mental Status: She is alert and oriented to person, place, and time.   Psychiatric:         Mood and Affect: Mood normal.         Behavior: Behavior normal.         Thought Content: Thought content normal.         Judgment: Judgment normal.           Results Review:  I have personally reviewed most recent cardiac tracings, lab results and radiology images and interpretations and agree with findings, most notably: Troponin, CBC, BMP, INR/PT, PTT, chest x-ray and EKG.    Results from last 7 days   Lab Units 02/01/22  2317 02/01/22  1654 02/01/22  1654   WBC 10*3/mm3 9.20   < > 10.30   HEMOGLOBIN g/dL 13.8   < > 15.4   HEMATOCRIT % 40.7   < > 45.0   PLATELETS 10*3/mm3 270   < > 305   INR   --   --  0.99    < > = values in this interval not displayed.     Results  from last 7 days   Lab Units 02/02/22  0454 02/01/22  2317 02/01/22  1813 02/01/22  1654 02/01/22  1654   SODIUM mmol/L  --  142  --    < > 142   POTASSIUM mmol/L  --  3.8  --    < > 3.9   CHLORIDE mmol/L  --  102  --    < > 102   CO2 mmol/L  --  28.0  --    < > 25.0   BUN mg/dL  --  11  --    < > 13   CREATININE mg/dL  --  0.82  --    < > 0.71   GLUCOSE mg/dL  --  94  --    < > 96   CALCIUM mg/dL  --  9.6  --    < > 10.0   TROPONIN T ng/mL <0.010  --  <0.010  --  <0.010    < > = values in this interval not displayed.     Estimated Creatinine Clearance: 67.4 mL/min (by C-G formula based on SCr of 0.82 mg/dL).  Brief Urine Lab Results  (Last result in the past 365 days)      Color   Clarity   Blood   Leuk Est   Nitrite   Protein   CREAT   Urine HCG        11/09/21 0959 Yellow   Cloudy   Trace   Large (3+)   Positive   Negative                 Microbiology Results (last 10 days)     ** No results found for the last 240 hours. **          ECG/EMG Results (most recent)     Procedure Component Value Units Date/Time    ECG 12 Lead [[id number]] Collected: 02/01/22 1355     Updated: 02/01/22 1411     QT Interval 402 ms     Narrative:      HEART RATE= 70  bpm  RR Interval= 856  ms  IA Interval= 159  ms  P Horizontal Axis= 0  deg  P Front Axis= 48  deg  QRSD Interval= 78  ms  QT Interval= 402  ms  QRS Axis= -25  deg  T Wave Axis= 30  deg  - ABNORMAL ECG -  Sinus rhythm  Inferior infarct, old  Electronically Signed By:   Date and Time of Study: 2022-02-01 13:55:42    SCANNED - TELEMETRY   [[id number]] Resulted: 02/01/22     Updated: 02/02/22 1037                  No radiology results for the last 7 days      Estimated Creatinine Clearance: 67.4 mL/min (by C-G formula based on SCr of 0.82 mg/dL).    Assessment/Plan   Assessment/Plan       Active Hospital Problems    Diagnosis  POA   • Chest pain [R07.9]  Yes      Resolved Hospital Problems   No resolved problems to display.     Chest pain  -Serial troponin: Less than 0.010  -EKG  shows sinus rhythm at 70 without obvious acute ST changes or ectopy with a QTC of 434 ms  -In the ED pt given 3 and 25 mg aspirin  -Stress Test reported as abnormal by interpreting provider  -Cardiology consulted with cath planned on 2/2/2022  -We will consult hospitalist/intensivist based on outcome of cath  -Continue telemetry    Hypertension  -Well controlled with a most recent blood pressure of 137/84  - Continue amlodipine-benazepril  - Monitor while admitted    Depression/anxiety  -Ativan and Lexapro    GERD  -PPI    Seasonal allergies  -Zyrtec    Obesity (BMI: 32.28)  -Encourage diet lifestyle modifications              VTE Prophylaxis -   Mechanical Order History:      Ordered        02/01/22 1745  Place Sequential Compression Device  Once            02/01/22 1745  Maintain Sequential Compression Device  Continuous                    Pharmalogical Order History:     None          CODE STATUS:    There are no questions and answers to display.       This patient has been examined wearing personal protective equipment.     I discussed the patient's findings and my recommendations with patient and nursing staff.      Signature:Electronically signed by Davey Velazquez PA-C, 02/02/22, 12:20 PM EST.

## 2022-02-03 ENCOUNTER — READMISSION MANAGEMENT (OUTPATIENT)
Dept: CALL CENTER | Facility: HOSPITAL | Age: 72
End: 2022-02-03

## 2022-02-03 VITALS
WEIGHT: 188 LBS | RESPIRATION RATE: 18 BRPM | HEART RATE: 69 BPM | BODY MASS INDEX: 32.1 KG/M2 | HEIGHT: 64 IN | DIASTOLIC BLOOD PRESSURE: 74 MMHG | OXYGEN SATURATION: 97 % | SYSTOLIC BLOOD PRESSURE: 159 MMHG | TEMPERATURE: 98.4 F

## 2022-02-03 LAB — QT INTERVAL: 402 MS

## 2022-02-03 PROCEDURE — G0378 HOSPITAL OBSERVATION PER HR: HCPCS

## 2022-02-03 NOTE — DISCHARGE SUMMARY
[facility]    Bert Shultz MD    CHIEF COMPLAINT:     Chest pain    HISTORY OF PRESENT ILLNESS:    Obtained from any provider HPI on 2/1/2022:  71-year-old female presents with chest pain.  Patient states the pain is in the center of her chest and sometimes radiates to her neck.  She has had some associated shortness of breath and palpitations.  Symptoms are worse when she is up moving around.  She states symptoms have been going on for a couple weeks.  She saw her primary doctor last week and had a chest x-ray on Thursday which was normal.  She is scheduled to see a cardiologist on Friday.  Symptoms became worse today so she came to the emergency room.  She denies any other alleviating or exacerbating factors.  She denies any pain currently.     2/2/2022: Patient Carlos HPI noted above including several week history of worsening intermittent left-sided chest pain described as a pressure which is made worse with exertion with some occasional dyspnea when symptoms are present.  Patient has noted some palpitations on occasion recently and also reports that approximately 2 months prior she went on a cruise where after carrying her and her friends luggage a long distance she experienced severe chest pain which lasted throughout the majority of the day before slowly resolving spontaneously.  She denies any nausea, vomiting, episodes of diaphoresis, cough or fever or sick contacts.  Patient does not smoke or drink alcohol.  Family history notable for father who had CAD with a CABG in his late sixties and a younger sister with vascular disease and carotid stenosis        Past Medical History:   Diagnosis Date   • Acute recurrent maxillary sinusitis    • Allergic    • Anxiety    • Arthritis    • Colon polyp    • Depression    • Diverticulitis    • Diverticulosis    • Dysphonia    • Environmental allergies    • GERD (gastroesophageal reflux disease)    • Headache    • History of medical  problems    • HL (hearing loss)    • Hyperlipidemia    • Hypertension    • Insomnia    • Vocal cord paralysis     follows w/ ENT- Loi     Past Surgical History:   Procedure Laterality Date   • BLADDER REPAIR     • BLADDER SUSPENSION     • CHOLECYSTECTOMY     • GALLBLADDER SURGERY     • LARYNX SURGERY     • OTHER SURGICAL HISTORY      had abox implaned to help stimulate the vocal cord surgery   • SUBTOTAL HYSTERECTOMY     • VOCAL CORD INJECTION       Family History   Problem Relation Age of Onset   • Hodgkin's lymphoma Mother    • Heart disease Father    • COPD Father    • Colon cancer Father    • Diabetes Sister    • Thyroid disease Sister    • Neuropathy Sister    • COPD Brother      Social History     Tobacco Use   • Smoking status: Never Smoker   • Smokeless tobacco: Never Used   Vaping Use   • Vaping Use: Never used   Substance Use Topics   • Alcohol use: No   • Drug use: No     Medications Prior to Admission   Medication Sig Dispense Refill Last Dose   • amLODIPine-benazepril (LOTREL 5-10) 5-10 MG per capsule TAKE 1 CAPSULE BY MOUTH EVERY DAY 90 capsule 1 1/31/2022 at Unknown time   • amoxicillin (AMOXIL) 500 MG capsule Take 2 capsules by mouth 3 (Three) Times a Day. 60 capsule 0 2/1/2022 at Unknown time   • ascorbic acid (VITAMIN C) 500 MG tablet Take 1 tablet by mouth Daily.   2/1/2022 at Unknown time   • aspirin 325 MG tablet Take 325 mg by mouth Daily.   2/1/2022 at Unknown time   • Calcium Carbonate-Vit D-Min (Calcium 600+D3 Plus Minerals) 600-800 MG-UNIT tablet Take 1 capsule by mouth Daily.   2/1/2022 at Unknown time   • escitalopram (LEXAPRO) 10 MG tablet TAKE 1 TABLET BY MOUTH EVERY DAY 90 tablet 1 2/1/2022 at Unknown time   • levocetirizine (XYZAL) 5 MG tablet    1/31/2022 at Unknown time   • LORazepam (ATIVAN) 1 MG tablet TAKE 1 TABLET BY MOUTH TWICE DAILY AS NEEDED FOR ANXIETY OR SLEEP 60 tablet 2 1/31/2022 at Unknown time   • meloxicam (MOBIC) 15 MG tablet TAKE 1 TABLET BY MOUTH DAILY 90  tablet 1 2/1/2022 at Unknown time   • montelukast (SINGULAIR) 10 MG tablet TAKE 1 TABLET BY MOUTH EVERY NIGHT 90 tablet 1 2/1/2022 at Unknown time   • multivitamin (THERAGRAN) tablet tablet Take 1 tablet by mouth.   2/1/2022 at Unknown time   • Omega-3 Fatty Acids (Fish Oil Extra Strength) 435 MG capsule    2/1/2022 at Unknown time   • omeprazole (priLOSEC) 40 MG capsule TAKE 1 CAPSULE BY MOUTH DAILY 90 capsule 1 1/31/2022 at Unknown time   • traZODone (DESYREL) 100 MG tablet TAKE 1/2 TABLET BY MOUTH NIGHTLY FOR THE FIRST WEEK THEN INCREASE TO 1 TABLET AT NIGHT 90 tablet 1 1/31/2022 at Unknown time     Allergies:  Hydrocodone    Immunization History   Administered Date(s) Administered   • COVID-19 (MODERNA) 1st, 2nd, 3rd Dose Only 04/07/2021, 05/11/2021   • FLUAD TRI 65YR+ 09/16/2020   • Flu Vaccine Intradermal Quad 18-64YR 11/07/2012   • Flu Vaccine Quad PF >18YRS 11/07/2012, 10/14/2015, 10/26/2016   • Fluad Quad 65+ 09/16/2020   • Fluzone High Dose =>65 Years ([facility] ONLY) 11/06/2017, 10/19/2018, 10/18/2019   • H1N1 Inj 12/09/2009   • Hepatitis A 10/19/2018   • Influenza Quad Vaccine (Inpatient) 10/14/2015, 10/26/2016   • Pneumococcal Conjugate 13-Valent (PCV13) 11/06/2017   • Pneumococcal Polysaccharide (PPSV23) 02/16/2019           REVIEW OF SYSTEMS:    ROS   Constitutional: Negative.   HENT: Negative.    Eyes: Negative.    Cardiovascular: Positive for chest pain, dyspnea on exertion and palpitations. Negative for irregular heartbeat, leg swelling, near-syncope, orthopnea and syncope.   Respiratory: Positive for shortness of breath.    Endocrine: Negative.    Skin: Negative.    Musculoskeletal: Negative.    Gastrointestinal: Negative.    Genitourinary: Negative.    Neurological: Negative.    Psychiatric/Behavioral: Negative.      Vital Signs  Temp:  [97.5 °F (36.4 °C)-98.2 °F (36.8 °C)] 98 °F (36.7 °C)  Heart Rate:  [59-67] 62  Resp:  [15-18] 15  BP: (107-139)/(59-86) 127/63          Physical Exam:  Physical  Exam   Vitals reviewed.   Constitutional:       General: She is not in acute distress.     Appearance: Normal appearance. She is obese. She is not ill-appearing, toxic-appearing or diaphoretic.   HENT:      Head: Normocephalic and atraumatic.      Right Ear: External ear normal.      Left Ear: External ear normal.      Nose: Nose normal.      Mouth/Throat:      Mouth: Mucous membranes are moist.   Eyes:      Extraocular Movements: Extraocular movements intact.   Cardiovascular:      Rate and Rhythm: Normal rate and regular rhythm.      Pulses: Normal pulses.   Pulmonary:      Effort: Pulmonary effort is normal.      Breath sounds: Normal breath sounds.   Abdominal:      General: Bowel sounds are normal. There is no distension.      Palpations: Abdomen is soft.      Tenderness: There is no abdominal tenderness.   Musculoskeletal:         General: Normal range of motion.      Cervical back: Normal range of motion.   Skin:     General: Skin is warm and dry.      Capillary Refill: Capillary refill takes less than 2 seconds.   Neurological:      General: No focal deficit present.      Mental Status: She is alert and oriented to person, place, and time.   Psychiatric:         Mood and Affect: Mood normal.         Behavior: Behavior normal.         Thought Content: Thought content normal.         Judgment: Judgment normal.     Emotional Behavior:    Normal   Debilities:   None  Results Review:    I reviewed the patient's new clinical results.  Lab Results (most recent)     Procedure Component Value Units Date/Time    COVID PRE-OP / PRE-PROCEDURE SCREENING ORDER (NO ISOLATION) - Swab, Nasopharynx [[id number]]  (Normal) Collected: 02/02/22 1426    Specimen: Swab from Nasopharynx Updated: 02/02/22 1701    Narrative:      The following orders were created for panel order COVID PRE-OP / PRE-PROCEDURE SCREENING ORDER (NO ISOLATION) - Swab, Nasopharynx.  Procedure                               Abnormality         Status                      ---------                               -----------         ------                     COVID-19,CEPHEID/MIGUEL,CO...[[id number]]  Normal              Final result                 Please view results for these tests on the individual orders.    COVID-19,CEPHEID/MIGUEL,COR/RAYMOND/PAD/[facility] IN-HOUSE(OR EMERGENT/ADD-ON),NP SWAB IN TRANSPORT MEDIA 3-4 HR TAT, RT-PCR - Swab, Nasopharynx [[id number]]  (Normal) Collected: 02/02/22 1426    Specimen: Swab from Nasopharynx Updated: 02/02/22 1701     COVID19 Not Detected    Narrative:      Fact sheet for providers: https://www.fda.gov/media/355876/download     Fact sheet for patients: https://www.fda.gov/media/558547/download  Fact sheet for providers: https://www.fda.gov/media/693148/download    Fact sheet for patients: https://www.fda.gov/media/032943/download    Test performed by PCR.    Troponin [[id number]]  (Normal) Collected: 02/02/22 0454    Specimen: Blood Updated: 02/02/22 0609     Troponin T <0.010 ng/mL     Narrative:      Troponin T Reference Range:  <= 0.03 ng/mL-   Negative for AMI  >0.03 ng/mL-     Abnormal for myocardial necrosis.  Clinicians would have to utilize clinical acumen, EKG, Troponin and serial changes to determine if it is an Acute Myocardial Infarction or myocardial injury due to an underlying chronic condition.       Results may be falsely decreased if patient taking Biotin.      Basic Metabolic Panel [[id number]]  (Normal) Collected: 02/01/22 2317    Specimen: Blood Updated: 02/02/22 0052     Glucose 94 mg/dL      BUN 11 mg/dL      Creatinine 0.82 mg/dL      Sodium 142 mmol/L      Potassium 3.8 mmol/L      Chloride 102 mmol/L      CO2 28.0 mmol/L      Calcium 9.6 mg/dL      eGFR Non African Amer 69 mL/min/1.73      BUN/Creatinine Ratio 13.4     Anion Gap 12.0 mmol/L     Narrative:      GFR Normal >60  Chronic Kidney Disease <60  Kidney Failure <15      CBC Auto Differential [[id number]]  (Normal) Collected: 02/01/22 5686    Specimen: Blood Updated:  02/02/22 0027     WBC 9.20 10*3/mm3      RBC 4.47 10*6/mm3      Hemoglobin 13.8 g/dL      Hematocrit 40.7 %      MCV 91.0 fL      MCH 30.9 pg      MCHC 34.0 g/dL      RDW 13.1 %      RDW-SD 42.4 fl      MPV 6.7 fL      Platelets 270 10*3/mm3      Neutrophil % 60.8 %      Lymphocyte % 28.4 %      Monocyte % 7.8 %      Eosinophil % 2.5 %      Basophil % 0.5 %      Neutrophils, Absolute 5.60 10*3/mm3      Lymphocytes, Absolute 2.60 10*3/mm3      Monocytes, Absolute 0.70 10*3/mm3      Eosinophils, Absolute 0.20 10*3/mm3      Basophils, Absolute 0.00 10*3/mm3      nRBC 0.0 /100 WBC     Troponin [[id number]]  (Normal) Collected: 02/01/22 1813    Specimen: Blood Updated: 02/01/22 1834     Troponin T <0.010 ng/mL     Narrative:      Troponin T Reference Range:  <= 0.03 ng/mL-   Negative for AMI  >0.03 ng/mL-     Abnormal for myocardial necrosis.  Clinicians would have to utilize clinical acumen, EKG, Troponin and serial changes to determine if it is an Acute Myocardial Infarction or myocardial injury due to an underlying chronic condition.       Results may be falsely decreased if patient taking Biotin.      [place] Draw [[id number]] Collected: 02/01/22 1654    Specimen: Blood Updated: 02/01/22 1800    Narrative:      The following orders were created for panel order [place] Draw.  Procedure                               Abnormality         Status                     ---------                               -----------         ------                     Green Top (Gel)[[id number]]                                  Final result               Lavender Top[[id number]]                                     Final result               Gold Top - SST[[id number]]                                   Final result               Light Blue Top[[id number]]                                   Final result                 Please view results for these tests on the individual orders.    Green Top (Gel) [[id number]] Collected: 02/01/22 1654    Specimen:  Blood Updated: 02/01/22 1800     Extra Tube Hold for add-ons.     Comment: Auto resulted.       Gold Top - SST [[id number]] Collected: 02/01/22 1654    Specimen: Blood Updated: 02/01/22 1800     Extra Tube Hold for add-ons.     Comment: Auto resulted.       Lavender Top [[id number]] Collected: 02/01/22 1654    Specimen: Blood Updated: 02/01/22 1800     Extra Tube hold for add-on     Comment: Auto resulted       Light Blue Top [[id number]] Collected: 02/01/22 1654    Specimen: Blood Updated: 02/01/22 1800     Extra Tube hold for add-on     Comment: Auto resulted       Protime-INR [[id number]]  (Normal) Collected: 02/01/22 1654    Specimen: Blood Updated: 02/01/22 1733     Protime 11.0 Seconds      INR 0.99    aPTT [[id number]]  (Normal) Collected: 02/01/22 1654    Specimen: Blood Updated: 02/01/22 1733     PTT 26.9 seconds     Basic Metabolic Panel [[id number]]  (Normal) Collected: 02/01/22 1654    Specimen: Blood Updated: 02/01/22 1730     Glucose 96 mg/dL      BUN 13 mg/dL      Creatinine 0.71 mg/dL      Sodium 142 mmol/L      Potassium 3.9 mmol/L      Comment: Slight hemolysis detected by analyzer. Results may be affected.        Chloride 102 mmol/L      CO2 25.0 mmol/L      Calcium 10.0 mg/dL      eGFR Non African Amer 82 mL/min/1.73      BUN/Creatinine Ratio 18.3     Anion Gap 15.0 mmol/L     Narrative:      GFR Normal >60  Chronic Kidney Disease <60  Kidney Failure <15      CBC & Differential [[id number]]  (Abnormal) Collected: 02/01/22 1654    Specimen: Blood Updated: 02/01/22 1717    Narrative:      The following orders were created for panel order CBC & Differential.  Procedure                               Abnormality         Status                     ---------                               -----------         ------                     CBC Auto Differential[[id number]]        Abnormal            Final result                 Please view results for these tests on the individual orders.    CBC Auto Differential  [[id number]]  (Abnormal) Collected: 02/01/22 1654    Specimen: Blood Updated: 02/01/22 1717     WBC 10.30 10*3/mm3      RBC 4.94 10*6/mm3      Hemoglobin 15.4 g/dL      Hematocrit 45.0 %      MCV 91.1 fL      MCH 31.2 pg      MCHC 34.2 g/dL      RDW 13.3 %      RDW-SD 42.4 fl      MPV 6.8 fL      Platelets 305 10*3/mm3      Neutrophil % 70.3 %      Lymphocyte % 20.4 %      Monocyte % 6.6 %      Eosinophil % 2.0 %      Basophil % 0.7 %      Neutrophils, Absolute 7.30 10*3/mm3      Lymphocytes, Absolute 2.10 10*3/mm3      Monocytes, Absolute 0.70 10*3/mm3      Eosinophils, Absolute 0.20 10*3/mm3      Basophils, Absolute 0.10 10*3/mm3      nRBC 0.0 /100 WBC           Imaging Results (Most Recent)     None        reviewed    ECG/EMG Results (most recent)     Procedure Component Value Units Date/Time    ECG 12 Lead [[id number]] Collected: 02/01/22 1355     Updated: 02/01/22 1411     QT Interval 402 ms     Narrative:      HEART RATE= 70  bpm  RR Interval= 856  ms  PA Interval= 159  ms  P Horizontal Axis= 0  deg  P Front Axis= 48  deg  QRSD Interval= 78  ms  QT Interval= 402  ms  QRS Axis= -25  deg  T Wave Axis= 30  deg  - ABNORMAL ECG -  Sinus rhythm  Inferior infarct, old  Electronically Signed By:   Date and Time of Study: 2022-02-01 13:55:42    SCANNED - TELEMETRY   [[id number]] Resulted: 02/01/22     Updated: 02/02/22 1037    Adult Transthoracic Echo Complete W/ Cont if Necessary Per Protocol [[id number]] Collected: 02/02/22 1616     Updated: 02/02/22 1907     BSA 1.9 m^2      RVIDd 2.9 cm      IVSd 0.96 cm      IVSs 1.6 cm      LVIDd 4.9 cm      LVIDs 2.4 cm      LVPWd 1.1 cm      BH CV ECHO ANGELIC - LVPWS 1.3 cm      IVS/LVPW 0.85     FS 50.4 %      EDV(Teich) 110.5 ml      ESV(Teich) 20.3 ml      EF(Teich) 81.6 %      EDV(cubed) 114.6 ml      ESV(cubed) 14.0 ml      EF(cubed) 87.8 %      % IVS thick 62.6 %      % LVPW thick 15.1 %      LV mass(C)d 184.1 grams      LV mass(C)dI 96.6 grams/m^2      LV mass(C)s 110.6 grams       LV mass(C)sI 58.0 grams/m^2      SV(Teich) 90.2 ml      SI(Teich) 47.3 ml/m^2      SV(cubed) 100.6 ml      SI(cubed) 52.8 ml/m^2      Ao root diam 3.0 cm      Ao root area 7.1 cm^2      ACS 2.0 cm      LVOT diam 1.9 cm      LVOT area 2.8 cm^2      EDV(MOD-sp4) 66.8 ml      ESV(MOD-sp4) 21.9 ml      EF(MOD-sp4) 67.2 %      SV(MOD-sp4) 44.9 ml      SI(MOD-sp4) 23.6 ml/m^2      Ao root area (BSA corrected) 1.6     LV Mcclellan Vol (BSA corrected) 35.1 ml/m^2      LV Sys Vol (BSA corrected) 11.5 ml/m^2      MV E max sushil 39.3 cm/sec      MV A max sushil 53.0 cm/sec      MV E/A 0.74     MV V2 max 117.9 cm/sec      MV max PG 5.6 mmHg      MV V2 mean 62.4 cm/sec      MV mean PG 1.9 mmHg      MV V2 VTI 28.7 cm      MVA(VTI) 2.2 cm^2      MV dec slope 281.9 cm/sec^2      MV dec time 0.28 sec      Ao pk sushil 99.3 cm/sec      Ao max PG 3.9 mmHg      Ao max PG (full) -0.56 mmHg      Ao V2 mean 73.5 cm/sec      Ao mean PG 2.4 mmHg      Ao mean PG (full) 0.54 mmHg      Ao V2 VTI 20.6 cm      JESUS(I,A) 3.1 cm^2      JESUS(I,D) 3.1 cm^2      JESUS(V,A) 3.0 cm^2      JESUS(V,D) 3.0 cm^2      LV V1 max PG 4.5 mmHg      LV V1 mean PG 1.9 mmHg      LV V1 max 106.1 cm/sec      LV V1 mean 64.0 cm/sec      LV V1 VTI 22.4 cm      SV(Ao) 145.7 ml      SI(Ao) 76.5 ml/m^2      SV(LVOT) 62.9 ml      SI(LVOT) 33.0 ml/m^2      PA V2 max 115.9 cm/sec      PA max PG 5.4 mmHg      PA max PG (full) 2.1 mmHg      PA V2 mean 81.2 cm/sec      PA mean PG 3.0 mmHg      PA mean PG (full) 1.3 mmHg      PA V2 VTI 22.8 cm      PA acc time 0.11 sec      RV V1 max PG 3.3 mmHg      RV V1 mean PG 1.6 mmHg      RV V1 max 90.5 cm/sec      RV V1 mean 59.3 cm/sec      RV V1 VTI 19.3 cm      TR max sushil 259.1 cm/sec      RVSP(TR) 30.3 mmHg      RAP systole 3.0 mmHg      PA pr(Accel) 30.7 mmHg      Pulm Sys Sushil 67.8 cm/sec      Pulm Mcclellan Sushil 38.2 cm/sec      Pulm S/D 1.8     Pulm A Revs Dur 0.11 sec      Pulm A Revs Sushil 39.1 cm/sec      BH CV ECHO ANGELIC - BZI_BMI 32.3 kilograms/m^2        CV ECHO ANGELIC - BSA([facility]) 2.0 m^2       CV ECHO ANGELIC - BZI_METRIC_WEIGHT 85.3 kg       CV ECHO ANGELIC - BZI_METRIC_HEIGHT 162.6 cm      Target HR (85%) 128 bpm      Max. Pred. HR (100%) 150 bpm      EF(MOD-bp) 67.0 %      LA dimension(2D) 3.8 cm     Narrative:      Normal LV size and contractility EF of 60 to 65%  Normal RV size  Normal atrial size  Aortic valve, mitral valve, tricuspid valve appears structurally normal,   no significant regurgitation seen.  No pericardial effusion seen.  Proximal aorta appears normal in size.        reviewed        Results for orders placed during the hospital encounter of 02/01/22    Adult Transthoracic Echo Complete W/ Cont if Necessary Per Protocol    Interpretation Summary  Normal LV size and contractility EF of 60 to 65%  Normal RV size  Normal atrial size  Aortic valve, mitral valve, tricuspid valve appears structurally normal, no significant regurgitation seen.  No pericardial effusion seen.  Proximal aorta appears normal in size.      Microbiology Results (last 10 days)     Procedure Component Value - Date/Time    COVID PRE-OP / PRE-PROCEDURE SCREENING ORDER (NO ISOLATION) - Swab, Nasopharynx [[id number]]  (Normal) Collected: 02/02/22 1426    Lab Status: Final result Specimen: Swab from Nasopharynx Updated: 02/02/22 1701    Narrative:      The following orders were created for panel order COVID PRE-OP / PRE-PROCEDURE SCREENING ORDER (NO ISOLATION) - Swab, Nasopharynx.  Procedure                               Abnormality         Status                     ---------                               -----------         ------                     COVID-19,CEPHEID/MIGUEL,CO...[[id number]]  Normal              Final result                 Please view results for these tests on the individual orders.    COVID-19,CEPHEID/MIGUEL,COR/[facility]MON[facility]/PAD/[facility] IN-HOUSE(OR EMERGENT/ADD-ON),NP SWAB IN TRANSPORT MEDIA 3-4 HR TAT, RT-PCR - Swab, Nasopharynx [[id number]]  (Normal) Collected: 02/02/22  1426    Lab Status: Final result Specimen: Swab from Nasopharynx Updated: 02/02/22 1701     COVID19 Not Detected    Narrative:      Fact sheet for providers: https://www.fda.gov/media/558862/download     Fact sheet for patients: https://www.fda.gov/media/736435/download  Fact sheet for providers: https://www.fda.gov/media/289860/download    Fact sheet for patients: https://www.fda.gov/media/287935/download    Test performed by PCR.          Assessment/Plan     Hypertension    Chest pain    Unstable angina (HCC)    Abnormal stress test       Chest pain  -Serial troponin: Less than 0.010  -EKG shows sinus rhythm at 70 without obvious acute ST changes or ectopy with a QTC of 434 ms  -In the ED pt given 3 and 25 mg aspirin  -Stress Test reported as abnormal by interpreting provider  -Cardiology consulted with cath planned on 2/2/2022  -We will consult hospitalist/intensivist based on outcome of cath  -Continue telemetry     Hypertension  -Well controlled with a most recent blood pressure of 137/84  - Continue amlodipine-benazepril  - Monitor while admitted     Depression/anxiety  -Ativan and Lexapro     GERD  -PPI     Seasonal allergies  -Zyrtec     Obesity (BMI: 32.28)  -Encourage diet lifestyle modifications    I discussed the patients findings and my recommendations with patient and nursing staf.     Discharge Diagnosis:      Hypertension    Chest pain    Unstable angina (HCC)    Abnormal stress test      Hospital Course  Patient is a 72 y.o. female presented with chest pain and HPI noted above.  Serial troponins were assessed and found to be less than 0.010 with EKG showing sinus rhythm at 70 without obvious acute ST changes or ectopy with a QTC of 404 ms.  She was given 324 mg aspirin in the ED and continued on telemetry without significant events noted.  Stress testing was performed on 2/2/2022 and reported by interpreting provider as abnormal.  Cardiology was consulted with cardiac catheterization recommended  which was performed successfully on 2/2/2022 and performing provider noting nonobstructive CAD with normal left ventricular function and LVEDP.  Patient did well postprocedure and reports being anxious for discharge home pending predicted increment weather in the morning.  At this time she is felt to be in good condition for discharge with close follow-up with her PCP as well as cardiology on outpatient basis.  Her full testing/results and plan were discussed with patient long with concerning/alarm symptoms which to call 911/return to the ED.  All questions were answered and she verbalizes her understanding and agreement.    Past Medical History:     Past Medical History:   Diagnosis Date   • Acute recurrent maxillary sinusitis    • Allergic    • Anxiety    • Arthritis    • Colon polyp    • Depression    • Diverticulitis    • Diverticulosis    • Dysphonia    • Environmental allergies    • GERD (gastroesophageal reflux disease)    • Headache    • History of medical problems    • HL (hearing loss)    • Hyperlipidemia    • Hypertension    • Insomnia    • Vocal cord paralysis     follows w/ ENT- Loi       Past Surgical History:     Past Surgical History:   Procedure Laterality Date   • BLADDER REPAIR     • BLADDER SUSPENSION     • CHOLECYSTECTOMY     • GALLBLADDER SURGERY     • LARYNX SURGERY     • OTHER SURGICAL HISTORY      had abox implaned to help stimulate the vocal cord surgery   • SUBTOTAL HYSTERECTOMY     • VOCAL CORD INJECTION         Social History:   Social History     Socioeconomic History   • Marital status:    Tobacco Use   • Smoking status: Never Smoker   • Smokeless tobacco: Never Used   Vaping Use   • Vaping Use: Never used   Substance and Sexual Activity   • Alcohol use: No   • Drug use: No   • Sexual activity: Not Currently       Procedures Performed    Procedure(s):  Left Heart Cath  -------------------       Consults:   Consults     Date and Time Order Name Status Description     2/2/2022  9:17 AM Inpatient Cardiology Consult            Condition on Discharge:     Stable    Discharge Disposition  Home or Self Care    Discharge Medications     Discharge Medications      Continue These Medications      Instructions Start Date   amLODIPine-benazepril 5-10 MG per capsule  Commonly known as: LOTREL 5-10   TAKE 1 CAPSULE BY MOUTH EVERY DAY      amoxicillin 500 MG capsule  Commonly known as: AMOXIL   1,000 mg, Oral, 3 Times Daily      ascorbic acid 500 MG tablet  Commonly known as: VITAMIN C   1 tablet, Oral, Daily      aspirin 325 MG tablet   325 mg, Oral, Daily      Calcium 600+D3 Plus Minerals 600-800 MG-UNIT tablet   1 capsule, Oral, Daily      escitalopram 10 MG tablet  Commonly known as: LEXAPRO   TAKE 1 TABLET BY MOUTH EVERY DAY      Fish Oil Extra Strength 435 MG capsule   No dose, route, or frequency recorded.      levocetirizine 5 MG tablet  Commonly known as: XYZAL   No dose, route, or frequency recorded.      LORazepam 1 MG tablet  Commonly known as: ATIVAN   TAKE 1 TABLET BY MOUTH TWICE DAILY AS NEEDED FOR ANXIETY OR SLEEP      meloxicam 15 MG tablet  Commonly known as: MOBIC   15 mg, Oral, Daily      montelukast 10 MG tablet  Commonly known as: SINGULAIR   10 mg, Oral, Nightly      multivitamin tablet tablet   1 tablet, Oral      omeprazole 40 MG capsule  Commonly known as: priLOSEC   40 mg, Oral, Daily      traZODone 100 MG tablet  Commonly known as: DESYREL   TAKE 1/2 TABLET BY MOUTH NIGHTLY FOR THE FIRST WEEK THEN INCREASE TO 1 TABLET AT NIGHT             Discharge Diet:     Activity at Discharge:     Follow-up Appointments  Future Appointments   Date Time Provider Department Center   2/4/2022 11:40 AM Dale Rawls MD [facility]     Additional Instructions for the Follow-ups that You Need to Schedule     Discharge Follow-up with PCP   As directed       Currently Documented PCP:    Bert Shultz MD    PCP Phone Number:    [phone]     Follow Up  Details: 5 to 7 days         Discharge Follow-up with Specified Provider: Cardiology; 1 Month   As directed      To: Cardiology    Follow Up: 1 Month               Test Results Pending at Discharge       Risk for Readmission (LACE) Score: 1 (2/2/2022  6:01 AM)          Davey Velazquez PA-C  02/02/22  21:32 EST

## 2022-02-03 NOTE — OUTREACH NOTE
Prep Survey      Responses   [facility] facility patient discharged from? [facility]   Is LACE score < 7 ? Yes   Emergency Room discharge w/ pulse ox? No   Eligibility TCM   [facility]   Date of Admission 02/01/22   Date of Discharge 02/03/22   Discharge Disposition Home or Self Care   Discharge diagnosis chest pain-left heart cath this visit   Does the patient have one of the following disease processes/diagnoses(primary or secondary)? Other   Does the patient have Home health ordered? No   Is there a DME ordered? No   Prep survey completed? Yes          Divine Crook RN

## 2022-02-03 NOTE — DISCHARGE INSTRUCTIONS
Post Cath Instructions  1) Drink plenty of fluids for the next 24 hours.  This helps to eliminate the dye used in your procedure through urination.  You may resume a normal diet; however, try to avoid foods that would cause gas or constipation.    2) Sedative medication given to you during your catheterization may decrease your judgement and reaction time for up to 24-48 hours.  Therefore:  a. DO NOT drive or operate hazardous machinery (48 hours)  b. DO NOT consume alcoholic beverages  c. DO NOT make any important/legal decisions  d. Have someone stay with you for at least 24 hours    3) To allow proper healing and prevent bleeding, the following activities are to be strictly avoided for the next 24-48 hours:  a. Excessive bending at wound site  b. Straining (anything that would tense up muscles around the affected puncture site)  c. Lifting objects greater than 5 pounds, pushing, or pulling for 5 days  1. Refrain from sexual activity  2. Refrain from running or vigorous walking  3. No prolonged sitting or standing  4. Limit stair climbing as much as possible    4) Keep the puncture site clean and dry.  You may remove the dressing tomorrow and replace it with a band-aid for at least one additional day.  Gently clean the site with mild soap and water.  No scrubbing/rubbing and lightly pat the area dry.  Showers are acceptable; however, avoid submerging in water (tub baths, hot tubs, swimming pools, dishwater, etc…) for at least one week.  The site should be completely healed before resuming these activities to reduce the risk of infection.  Check the site often.  Watch for signs and symptoms of infection and notify your physician if any of the following occur:  a. Bleeding or an increase in swelling at the puncture site  b. Fever  c. Increased soreness around puncture site  d. Foul odor or significant drainage from the puncture site  e. Swelling, redness, or warmth at the puncture site  **A bruise or small “pea  sized” lump under the skin at the puncture site is not unusual.  This should disappear within 3-4 weeks.**    5) CONTACT YOUR PHYSICIAN OR CALL 911 IF YOU EXPERIENCE ANY OF THE FOLLOWING:  a. Increased angina (chest pain) or frequent sensations of pressure, burning, pain, or other discomfort in the chest, arm, jaws, or stomach  b. Lightheadedness, dizziness, faint feeling, sweating, or difficulty breathing  c. Odd sensation changes like numbness, tingling, coldness, or pain in the arm or leg in which the catheter was inserted  d. Limb in which the catheter was inserted becomes pale/bluish in color    IMPORTANT:  Although this occurs very rarely, if you should develop bright red or excessive bleeding, feel a “pop” inside at the insertion site, or notice a sudden increase in swelling larger than a walnut, you should call 911.  Hold continuous firm pressure to the access site until emergency personnel arrive.  It is best if someone else can do this for you.

## 2022-02-04 ENCOUNTER — TRANSITIONAL CARE MANAGEMENT TELEPHONE ENCOUNTER (OUTPATIENT)
Dept: CALL CENTER | Facility: HOSPITAL | Age: 72
End: 2022-02-04

## 2022-02-04 NOTE — OUTREACH NOTE
Call Center TCM Note      Responses   [facility] patient discharged from? [facility]   Does the patient have one of the following disease processes/diagnoses(primary or secondary)? Other   TCM attempt successful? No  [Verbal release is over a year old]   Unsuccessful attempts Attempt 1   Call Status Left message          Nieves Moore RN    2/4/2022, 12:27 EST

## 2022-02-04 NOTE — CASE MANAGEMENT/SOCIAL WORK
Case Management Discharge Note                Selected Continued Care - Discharged on 2/3/2022 Admission date: 2/1/2022 - Discharge disposition: Home or Self Care     Final Discharge Disposition Code: 01 - home or self-care

## 2022-02-04 NOTE — OUTREACH NOTE
Call Center TCM Note      Responses   [facility] patient discharged from? [facility]   Does the patient have one of the following disease processes/diagnoses(primary or secondary)? Other   TCM attempt successful? Yes   Call start time 1357   Call end time 1403   Discharge diagnosis chest pain-left heart cath this visit   Meds reviewed with patient/caregiver? Yes   Is the patient having any side effects they believe may be caused by any medication additions or changes? No   Does the patient have all medications ordered at discharge? N/A   Is the patient taking all medications as directed (includes completed medication regime)? Yes   Does the patient have a primary care provider?  Yes   Does the patient have an appointment with their PCP within 7 days of discharge? Yes   Comments regarding PCP Hospital d/c f/u is on 2/7/22 at 1:00 pm    Has the patient kept scheduled appointments due by today? N/A   Psychosocial issues? No   Did the patient receive a copy of their discharge instructions? Yes   Nursing interventions Reviewed instructions with patient   What is the patient's perception of their health status since discharge? Same  [Pt reports she's still have chest pain]   Is the patient/caregiver able to teach back signs and symptoms related to disease process for when to call PCP? Yes   Is the patient/caregiver able to teach back signs and symptoms related to disease process for when to call 911? Yes   Is the patient/caregiver able to teach back the hierarchy of who to call/visit for symptoms/problems? PCP, Specialist, Home health nurse, Urgent Care, ED, 911 Yes   If the patient is a current smoker, are they able to teach back resources for cessation? Not a smoker   TCM call completed? Yes          Nieves Moore RN    2/4/2022, 14:04 EST

## 2022-02-07 ENCOUNTER — OFFICE VISIT (OUTPATIENT)
Dept: FAMILY MEDICINE CLINIC | Facility: CLINIC | Age: 72
End: 2022-02-07

## 2022-02-07 VITALS
SYSTOLIC BLOOD PRESSURE: 120 MMHG | WEIGHT: 214.4 LBS | DIASTOLIC BLOOD PRESSURE: 82 MMHG | TEMPERATURE: 97.3 F | BODY MASS INDEX: 36.6 KG/M2 | RESPIRATION RATE: 18 BRPM | OXYGEN SATURATION: 94 % | HEIGHT: 64 IN | HEART RATE: 86 BPM

## 2022-02-07 DIAGNOSIS — Z12.31 ENCOUNTER FOR SCREENING MAMMOGRAM FOR MALIGNANT NEOPLASM OF BREAST: ICD-10-CM

## 2022-02-07 DIAGNOSIS — Z09 HOSPITAL DISCHARGE FOLLOW-UP: Primary | ICD-10-CM

## 2022-02-07 DIAGNOSIS — R07.9 CHEST PAIN, UNSPECIFIED TYPE: ICD-10-CM

## 2022-02-07 PROBLEM — Z96.1 BILATERAL PSEUDOPHAKIA: Status: ACTIVE | Noted: 2021-04-20

## 2022-02-07 PROBLEM — H16.229 KERATOCONJUNCTIVITIS SICCA NOT DUE TO SJOGREN'S SYNDROME: Status: ACTIVE | Noted: 2021-04-20

## 2022-02-07 PROCEDURE — 1111F DSCHRG MED/CURRENT MED MERGE: CPT | Performed by: NURSE PRACTITIONER

## 2022-02-07 PROCEDURE — 99495 TRANSJ CARE MGMT MOD F2F 14D: CPT | Performed by: NURSE PRACTITIONER

## 2022-02-07 NOTE — PROGRESS NOTES
Transitional Care Follow Up Visit  Subjective     Kourtney Siddiqi is a 72 y.o. female who presents for a transitional care management visit.    Within 48 business hours after discharge our office contacted her via telephone to coordinate her care and needs.      I reviewed and discussed the details of that call along with the discharge summary, hospital problems, inpatient lab results, inpatient diagnostic studies, and consultation reports with Kourtney.     Current outpatient and discharge medications have been reconciled for the patient.  Reviewed by: KARLEE Johnson      She tells me today that she is beginning to feel better with her chest pain.  She recalls that last year after having covid she had chest pain for a time and then it resolved - she feels like perhaps this is the same.  Doesn't do it often, but has taken an ativan when experiencing chest pain and it does help.    Date of TCM Phone Call 2/3/2022   [facility]   Date of Admission 2/1/2022   Date of Discharge 2/3/2022   Discharge Disposition Home or Self Care     Risk for Readmission (LACE) Score: 1 (2/2/2022  6:01 AM)      History of Present Illness   Course During Hospital Stay:  3 DAYS, 2 NIGHTS - DDX CHEST PAIN, L HEART CATH  CHIEF COMPLAINT:      Chest pain     HISTORY OF PRESENT ILLNESS:     Obtained from any provider HPI on 2/1/2022:  71-year-old female presents with chest pain.  Patient states the pain is in the center of her chest and sometimes radiates to her neck.  She has had some associated shortness of breath and palpitations.  Symptoms are worse when she is up moving around.  She states symptoms have been going on for a couple weeks.  She saw her primary doctor last week and had a chest x-ray on Thursday which was normal.  She is scheduled to see a cardiologist on Friday.  Symptoms became worse today so she came to the emergency room.  She denies any other alleviating or exacerbating factors.  She denies any pain  currently.     2/2/2022: Patient Carlos HPI noted above including several week history of worsening intermittent left-sided chest pain described as a pressure which is made worse with exertion with some occasional dyspnea when symptoms are present.  Patient has noted some palpitations on occasion recently and also reports that approximately 2 months prior she went on a cruise where after carrying her and her friends luggage a long distance she experienced severe chest pain which lasted throughout the majority of the day before slowly resolving spontaneously.  She denies any nausea, vomiting, episodes of diaphoresis, cough or fever or sick contacts.  Patient does not smoke or drink alcohol.  Family history notable for father who had CAD with a CABG in his late sixties and a younger sister with vascular disease and carotid stenosis    Discharge Diagnosis:       Hypertension    Chest pain    Unstable angina (HCC)    Abnormal stress test    Follow-up Appointments         Future Appointments   Date Time Provider Department Center   2/4/2022 11:40 AM Dale Rawls MD [facility]           The following portions of the patient's history were reviewed and updated as appropriate: allergies, current medications, past family history, past medical history, past social history, past surgical history and problem list.    Review of Systems   HENT:        Sense of smell and taste are beginning to return   Cardiovascular: Positive for chest pain and palpitations.        Continues to have mild, intermittent chest pain, rates at 1-2/10   Neurological: Negative for dizziness, weakness and headaches.       Objective   Physical Exam  Vitals reviewed.   Constitutional:       Appearance: Normal appearance.   Neck:      Vascular: No carotid bruit.   Cardiovascular:      Rate and Rhythm: Normal rate and regular rhythm.      Pulses: Normal pulses.      Heart sounds: Normal heart sounds.   Pulmonary:      Effort:  Pulmonary effort is normal.      Breath sounds: Normal breath sounds.   Musculoskeletal:      Cervical back: Neck supple.      Right lower leg: No edema.      Left lower leg: No edema.   Neurological:      Mental Status: She is alert and oriented to person, place, and time.         Assessment/Plan   Diagnoses and all orders for this visit:    1. Hospital discharge follow-up (Primary)    2. Chest pain, unspecified type

## 2022-02-17 ENCOUNTER — TELEPHONE (OUTPATIENT)
Dept: FAMILY MEDICINE CLINIC | Facility: CLINIC | Age: 72
End: 2022-02-17

## 2022-02-17 NOTE — TELEPHONE ENCOUNTER
HUB TO SHARE: LEFT VM LETTING PATIENT KNOW DR CARNES CANNOT PRESCRIBE AN ANTIBIOTIC WITHOUT HER AT LEAST LEAVING A URINE SAMPLE.

## 2022-02-17 NOTE — TELEPHONE ENCOUNTER
DELETE AFTER REVIEWING: Telephone encounter to be sent to the clinical pool     Caller: Kourtney Siddiqi    Relationship: Self    Best call back number:[phone]  What medication are you requesting: ANTIBIOTIC    What are your current symptoms: UTI    How long have you been experiencing symptoms: 1 DAY     Have you had these symptoms before:    [] Yes  [] No    Have you been treated for these symptoms before:   [] Yes  [] No    If a prescription is needed, what is your preferred pharmacy and phone number: [id number] [address]  [address]  - [phone]  - [phone] FX     Additional notes:IS GOING OUT OF TOWN 2/17/22. WANTS THIS CALLED IN AS SOON AS POSSIBLE

## 2022-02-28 ENCOUNTER — HOSPITAL ENCOUNTER (OUTPATIENT)
Dept: MAMMOGRAPHY | Facility: HOSPITAL | Age: 72
Discharge: HOME | End: 2022-02-28
Admitting: NURSE PRACTITIONER

## 2022-02-28 DIAGNOSIS — Z12.31 ENCOUNTER FOR SCREENING MAMMOGRAM FOR MALIGNANT NEOPLASM OF BREAST: ICD-10-CM

## 2022-02-28 PROCEDURE — 77067 SCR MAMMO BI INCL CAD: CPT

## 2022-02-28 PROCEDURE — 77063 BREAST TOMOSYNTHESIS BI: CPT

## 2022-03-07 ENCOUNTER — OFFICE VISIT (OUTPATIENT)
Dept: CARDIOLOGY | Facility: CLINIC | Age: 72
End: 2022-03-07

## 2022-03-07 VITALS
HEART RATE: 75 BPM | SYSTOLIC BLOOD PRESSURE: 137 MMHG | OXYGEN SATURATION: 95 % | DIASTOLIC BLOOD PRESSURE: 87 MMHG | WEIGHT: 219 LBS | BODY MASS INDEX: 37.39 KG/M2 | HEIGHT: 64 IN

## 2022-03-07 DIAGNOSIS — R73.03 PREDIABETES: ICD-10-CM

## 2022-03-07 DIAGNOSIS — Z82.49 FAMILY HISTORY OF PREMATURE CAD: ICD-10-CM

## 2022-03-07 DIAGNOSIS — E66.9 OBESITY (BMI 30-39.9): ICD-10-CM

## 2022-03-07 DIAGNOSIS — I10 ESSENTIAL HYPERTENSION: Primary | ICD-10-CM

## 2022-03-07 PROCEDURE — 99214 OFFICE O/P EST MOD 30 MIN: CPT | Performed by: INTERNAL MEDICINE

## 2022-03-07 NOTE — PROGRESS NOTES
"    Subjective:     Encounter Date:03/07/2022      Patient ID: Kourtney Siddiqi is a 72 y.o. female.    Chief Complaint : Hospital follow-up for chest pain, palpitations, hypertension, obesity with BMI over 30, positive family history  History of Present Illness      Ms. Kourtney Siddiqi has PMH of    - CAD, cath 2/2/22No obstructive CAD.  Normal LV systolic function and LVEDP  - hypertension   - COVID 19 (3 weeks ago and last year)  - diverticulitis, anxiety/depression, GERD   - vocal cord paralysis s/p surgery   - previous history of cholecystectomy, hysterectomy,  Bladder suspension   - family history of CAD/CABG in father  - non smoker   - Allergy to hydrocodone      Here for follow-up.  Patient denies any chest pain or shortness of breath.  Patient thinks her chest pain in the hospital was due to Covid.  Review of records reveal that patient presented to the ED with chest pain.  Patient reports over the last several weeks and even months she has had intermittent chest pain with exertion, as well as shortness of breath that improves with rest.  She states that it is not \"excuriating\" pain, more of a discomfort.  Yesterday the pain became worse therefore she came to the ED. She also states that she is has had intermittent pain into her left neck and jaw area. Patient has seen her PCP recently for this and had been referred to me, with appointment scheduled for Friday.  Patient's labs reviewed and showed negative serial cardiac enzymes, BMP and CBC were unremarkable. EKG showed normal sinus rhythm with possible old infarct.  Patient underwent stress test this morning that was abnormal.         Assessment:  :     Chest pain, palpitations, normal cardiac cath  Abnormal stress test  Hypertension  Family history of heart disease in father  Hyperglycemia, 135, A1c 6.2 on 7/20/2021 consistent with prediabetes  Obesity with BMI over 32  Hepatic steatosis by ultrasound dated 3/21  Status post cholecystectomy, " hysterectomy  Allergies/intolerance to hydrocodone              Recommendations / Plan:         Reviewed cardiac cath results with patient.  We will continue medical management with amlodipine, benazepril, aspirin, as tolerated.  Reviewed BMI over 30, counseled on weight loss diet and exercise.  Blood pressure is well controlled.  Advised her to follow-up with PMD and follow-up with me as needed.    Procedures cardiac cath 2/2/2022 reviewed, performed, interpreted by me reveals no obstructive CAD.    The following portions of the patient's history were reviewed and updated as appropriate: allergies, current medications, past family history, past medical history, past social history, past surgical history and problem list.    Assessment:         [facility]     Diagnosis Plan   1. Essential hypertension     2. Prediabetes     3. Obesity (BMI 30-39.9)     4. Family history of premature CAD            Plan:               Past Medical History:  Past Medical History:   Diagnosis Date   • Acute recurrent maxillary sinusitis    • Allergic    • Anxiety    • Arthritis    • Colon polyp    • COVID-19 01/01/2022    and 1/2021   • Depression    • Diverticulitis    • Diverticulosis    • Dysphonia    • Environmental allergies    • GERD (gastroesophageal reflux disease)    • Headache    • History of medical problems    • HL (hearing loss)    • Hyperlipidemia    • Hypertension    • Insomnia    • Unstable angina (HCC) 02/01/2022    @ [facility]    • Vocal cord paralysis     follows w/ ENT- [facility]     Past Surgical History:  Past Surgical History:   Procedure Laterality Date   • BLADDER REPAIR     • BLADDER SUSPENSION     • CARDIAC CATHETERIZATION N/A 2/2/2022    Procedure: Left Heart Cath;  Surgeon: Dale Rawls MD;  Location: [facility] INVASIVE LOCATION;  Service: Cardiovascular;  Laterality: N/A;   • CHOLECYSTECTOMY     • COLONOSCOPY W/ POLYPECTOMY  08/17/2018    REPEAT 5 YRS - DUE 8/2023   • ESOPHAGOSCOPY / EGD  08/17/2018    W.  DILATION   • GALLBLADDER SURGERY     • LARYNX SURGERY     • OTHER SURGICAL HISTORY      had abox implaned to help stimulate the vocal cord surgery   • SUBTOTAL HYSTERECTOMY     • THYROPLASTY Left 02/25/2021    DR VO @ [facility]   • VOCAL CORD INJECTION        Allergies:  Allergies   Allergen Reactions   • Hydrocodone Mental Status Change     Home Meds:  Current Meds:     Current Outpatient Medications:   •  ALLERGY SERUM INJECTION, Inject  under the skin into the appropriate area as directed 1 (One) Time., Disp: , Rfl:   •  amLODIPine-benazepril (LOTREL 5-10) 5-10 MG per capsule, TAKE 1 CAPSULE BY MOUTH EVERY DAY, Disp: 90 capsule, Rfl: 1  •  ascorbic acid (VITAMIN C) 500 MG tablet, Take 1 tablet by mouth Daily., Disp: , Rfl:   •  aspirin 325 MG tablet, Take 325 mg by mouth Daily., Disp: , Rfl:   •  Calcium Carbonate-Vit D-Min (Calcium 600+D3 Plus Minerals) 600-800 MG-UNIT tablet, Take 1 capsule by mouth Daily., Disp: , Rfl:   •  escitalopram (LEXAPRO) 10 MG tablet, TAKE 1 TABLET BY MOUTH EVERY DAY, Disp: 90 tablet, Rfl: 1  •  levocetirizine (XYZAL) 5 MG tablet, , Disp: , Rfl:   •  LORazepam (ATIVAN) 1 MG tablet, TAKE 1 TABLET BY MOUTH TWICE DAILY AS NEEDED FOR ANXIETY OR SLEEP, Disp: 60 tablet, Rfl: 2  •  meloxicam (MOBIC) 15 MG tablet, TAKE 1 TABLET BY MOUTH DAILY, Disp: 90 tablet, Rfl: 1  •  montelukast (SINGULAIR) 10 MG tablet, TAKE 1 TABLET BY MOUTH EVERY NIGHT, Disp: 90 tablet, Rfl: 1  •  multivitamin (THERAGRAN) tablet tablet, Take 1 tablet by mouth., Disp: , Rfl:   •  Omega-3 Fatty Acids (Fish Oil Extra Strength) 435 MG capsule, , Disp: , Rfl:   •  omeprazole (priLOSEC) 40 MG capsule, TAKE 1 CAPSULE BY MOUTH DAILY, Disp: 90 capsule, Rfl: 1  •  traZODone (DESYREL) 100 MG tablet, TAKE 1/2 TABLET BY MOUTH NIGHTLY FOR THE FIRST WEEK THEN INCREASE TO 1 TABLET AT NIGHT, Disp: 90 tablet, Rfl: 1  Social History:   Social History     Tobacco Use   • Smoking status: Never Smoker   • Smokeless tobacco: Never Used  "  Substance Use Topics   • Alcohol use: No      Family History:  Family History   Problem Relation Age of Onset   • Hodgkin's lymphoma Mother    • Heart disease Father    • COPD Father    • Colon cancer Father    • Diabetes Sister    • Thyroid disease Sister    • Neuropathy Sister    • COPD Brother               Review of Systems   Constitutional: Negative for malaise/fatigue.   Cardiovascular: Negative for chest pain, leg swelling and palpitations.   Respiratory: Negative for shortness of breath.    Skin: Negative for rash.   Neurological: Negative for dizziness, light-headedness and numbness.     All other systems are negative         Objective:     Physical Exam  /87   Pulse 75   Ht 162.6 cm (64\")   Wt 99.3 kg (219 lb)   SpO2 95%   BMI 37.59 kg/m²   General:  Appears in no acute distress  Eyes: Sclera is anicteric,  conjunctiva is clear   HEENT:  No JVD.  No carotid bruits  Respiratory: Respirations regular and unlabored at rest.  Clear to auscultation  Cardiovascular: S1,S2 Regular rate and rhythm. No murmur, rub or gallop auscultated.   Extremities: No digital clubbing or cyanosis, no edema  Skin: Color pink. Skin warm and dry to touch. No rashes  No xanthoma  Neuro: Alert and awake.    Lab Reviewed:         Dale Rawls MD  3/7/2022 12:14 EST      Much of the above report is an electronic transcription/translation of the spoken language to printed text using Dragon Software. As such, the subtleties and finesse of the spoken language may permit erroneous, or at times, nonsensical words or phrases to be inadvertently transcribed; thus changes may be made at a later date to rectify these errors.         "

## 2022-03-25 DIAGNOSIS — F41.9 ANXIETY: ICD-10-CM

## 2022-03-25 RX ORDER — ESCITALOPRAM OXALATE 10 MG/1
TABLET ORAL
Qty: 90 TABLET | Refills: 1 | Status: SHIPPED | OUTPATIENT
Start: 2022-03-25 | End: 2022-09-21

## 2022-03-28 RX ORDER — LORAZEPAM 1 MG/1
TABLET ORAL
Qty: 60 TABLET | Refills: 1 | Status: SHIPPED | OUTPATIENT
Start: 2022-03-28 | End: 2022-06-20

## 2022-04-11 ENCOUNTER — LAB (OUTPATIENT)
Dept: FAMILY MEDICINE CLINIC | Facility: CLINIC | Age: 72
End: 2022-04-11

## 2022-04-11 ENCOUNTER — OFFICE VISIT (OUTPATIENT)
Dept: FAMILY MEDICINE CLINIC | Facility: CLINIC | Age: 72
End: 2022-04-11

## 2022-04-11 VITALS
SYSTOLIC BLOOD PRESSURE: 106 MMHG | HEIGHT: 64 IN | DIASTOLIC BLOOD PRESSURE: 68 MMHG | WEIGHT: 212 LBS | HEART RATE: 71 BPM | OXYGEN SATURATION: 97 % | RESPIRATION RATE: 17 BRPM | BODY MASS INDEX: 36.19 KG/M2 | TEMPERATURE: 96.6 F

## 2022-04-11 DIAGNOSIS — H81.09 MENIERE'S DISEASE, UNSPECIFIED LATERALITY: ICD-10-CM

## 2022-04-11 DIAGNOSIS — Z00.00 MEDICARE ANNUAL WELLNESS VISIT, SUBSEQUENT: Primary | ICD-10-CM

## 2022-04-11 DIAGNOSIS — J38.00 VOCAL CORD PARALYSIS: ICD-10-CM

## 2022-04-11 DIAGNOSIS — K21.9 GASTROESOPHAGEAL REFLUX DISEASE, UNSPECIFIED WHETHER ESOPHAGITIS PRESENT: ICD-10-CM

## 2022-04-11 DIAGNOSIS — E55.9 VITAMIN D DEFICIENCY, UNSPECIFIED: ICD-10-CM

## 2022-04-11 DIAGNOSIS — F41.9 ANXIETY: ICD-10-CM

## 2022-04-11 DIAGNOSIS — F32.A DEPRESSION, UNSPECIFIED DEPRESSION TYPE: ICD-10-CM

## 2022-04-11 DIAGNOSIS — M15.8 OTHER OSTEOARTHRITIS INVOLVING MULTIPLE JOINTS: ICD-10-CM

## 2022-04-11 DIAGNOSIS — J30.9 ALLERGIC RHINITIS, UNSPECIFIED SEASONALITY, UNSPECIFIED TRIGGER: ICD-10-CM

## 2022-04-11 DIAGNOSIS — I10 PRIMARY HYPERTENSION: ICD-10-CM

## 2022-04-11 DIAGNOSIS — R93.7 ABNORMAL FINDINGS ON DIAGNOSTIC IMAGING OF OTHER PARTS OF MUSCULOSKELETAL SYSTEM: ICD-10-CM

## 2022-04-11 PROBLEM — R07.9 CHEST PAIN: Status: RESOLVED | Noted: 2022-02-01 | Resolved: 2022-04-11

## 2022-04-11 LAB
25(OH)D3 SERPL-MCNC: 63.6 NG/ML (ref 30–100)
ALBUMIN SERPL-MCNC: 4.7 G/DL (ref 3.5–5.2)
ALBUMIN/GLOB SERPL: 1.7 G/DL
ALP SERPL-CCNC: 101 U/L (ref 39–117)
ALT SERPL W P-5'-P-CCNC: 24 U/L (ref 1–33)
ANION GAP SERPL CALCULATED.3IONS-SCNC: 13.3 MMOL/L (ref 5–15)
AST SERPL-CCNC: 24 U/L (ref 1–32)
BASOPHILS # BLD AUTO: 0.06 10*3/MM3 (ref 0–0.2)
BASOPHILS NFR BLD AUTO: 0.6 % (ref 0–1.5)
BILIRUB SERPL-MCNC: 0.3 MG/DL (ref 0–1.2)
BUN SERPL-MCNC: 19 MG/DL (ref 8–23)
BUN/CREAT SERPL: 22.1 (ref 7–25)
CALCIUM SPEC-SCNC: 10 MG/DL (ref 8.6–10.5)
CHLORIDE SERPL-SCNC: 102 MMOL/L (ref 98–107)
CHOLEST SERPL-MCNC: 145 MG/DL (ref 0–200)
CO2 SERPL-SCNC: 25.7 MMOL/L (ref 22–29)
CREAT SERPL-MCNC: 0.86 MG/DL (ref 0.57–1)
DEPRECATED RDW RBC AUTO: 41.6 FL (ref 37–54)
EGFRCR SERPLBLD CKD-EPI 2021: 72.8 ML/MIN/1.73
EOSINOPHIL # BLD AUTO: 0.24 10*3/MM3 (ref 0–0.4)
EOSINOPHIL NFR BLD AUTO: 2.4 % (ref 0.3–6.2)
ERYTHROCYTE [DISTWIDTH] IN BLOOD BY AUTOMATED COUNT: 12.7 % (ref 12.3–15.4)
GLOBULIN UR ELPH-MCNC: 2.7 GM/DL
GLUCOSE SERPL-MCNC: 96 MG/DL (ref 65–99)
HBA1C MFR BLD: 6 % (ref 3.5–5.6)
HCT VFR BLD AUTO: 43.4 % (ref 34–46.6)
HDLC SERPL-MCNC: 53 MG/DL (ref 40–60)
HGB BLD-MCNC: 14.5 G/DL (ref 12–15.9)
IMM GRANULOCYTES # BLD AUTO: 0.04 10*3/MM3 (ref 0–0.05)
IMM GRANULOCYTES NFR BLD AUTO: 0.4 % (ref 0–0.5)
LDLC SERPL CALC-MCNC: 72 MG/DL (ref 0–100)
LDLC/HDLC SERPL: 1.32 {RATIO}
LYMPHOCYTES # BLD AUTO: 2.03 10*3/MM3 (ref 0.7–3.1)
LYMPHOCYTES NFR BLD AUTO: 20.4 % (ref 19.6–45.3)
MCH RBC QN AUTO: 30.5 PG (ref 26.6–33)
MCHC RBC AUTO-ENTMCNC: 33.4 G/DL (ref 31.5–35.7)
MCV RBC AUTO: 91.2 FL (ref 79–97)
MONOCYTES # BLD AUTO: 0.85 10*3/MM3 (ref 0.1–0.9)
MONOCYTES NFR BLD AUTO: 8.5 % (ref 5–12)
NEUTROPHILS NFR BLD AUTO: 6.74 10*3/MM3 (ref 1.7–7)
NEUTROPHILS NFR BLD AUTO: 67.7 % (ref 42.7–76)
NRBC BLD AUTO-RTO: 0 /100 WBC (ref 0–0.2)
PLATELET # BLD AUTO: 313 10*3/MM3 (ref 140–450)
PMV BLD AUTO: 9.1 FL (ref 6–12)
POTASSIUM SERPL-SCNC: 4.1 MMOL/L (ref 3.5–5.2)
PROT SERPL-MCNC: 7.4 G/DL (ref 6–8.5)
RBC # BLD AUTO: 4.76 10*6/MM3 (ref 3.77–5.28)
SODIUM SERPL-SCNC: 141 MMOL/L (ref 136–145)
T4 FREE SERPL-MCNC: 1.37 NG/DL (ref 0.93–1.7)
TRIGL SERPL-MCNC: 109 MG/DL (ref 0–150)
TSH SERPL DL<=0.05 MIU/L-ACNC: 0.96 UIU/ML (ref 0.27–4.2)
VIT B12 BLD-MCNC: >2000 PG/ML (ref 211–946)
VLDLC SERPL-MCNC: 20 MG/DL (ref 5–40)
WBC NRBC COR # BLD: 9.96 10*3/MM3 (ref 3.4–10.8)

## 2022-04-11 PROCEDURE — 80053 COMPREHEN METABOLIC PANEL: CPT | Performed by: FAMILY MEDICINE

## 2022-04-11 PROCEDURE — 84439 ASSAY OF FREE THYROXINE: CPT | Performed by: FAMILY MEDICINE

## 2022-04-11 PROCEDURE — 83036 HEMOGLOBIN GLYCOSYLATED A1C: CPT | Performed by: FAMILY MEDICINE

## 2022-04-11 PROCEDURE — 82607 VITAMIN B-12: CPT | Performed by: FAMILY MEDICINE

## 2022-04-11 PROCEDURE — 1170F FXNL STATUS ASSESSED: CPT | Performed by: FAMILY MEDICINE

## 2022-04-11 PROCEDURE — 82306 VITAMIN D 25 HYDROXY: CPT | Performed by: FAMILY MEDICINE

## 2022-04-11 PROCEDURE — 36415 COLL VENOUS BLD VENIPUNCTURE: CPT | Performed by: FAMILY MEDICINE

## 2022-04-11 PROCEDURE — 84443 ASSAY THYROID STIM HORMONE: CPT | Performed by: FAMILY MEDICINE

## 2022-04-11 PROCEDURE — G0439 PPPS, SUBSEQ VISIT: HCPCS | Performed by: FAMILY MEDICINE

## 2022-04-11 PROCEDURE — 80061 LIPID PANEL: CPT | Performed by: FAMILY MEDICINE

## 2022-04-11 PROCEDURE — 1159F MED LIST DOCD IN RCRD: CPT | Performed by: FAMILY MEDICINE

## 2022-04-11 PROCEDURE — 99214 OFFICE O/P EST MOD 30 MIN: CPT | Performed by: FAMILY MEDICINE

## 2022-04-11 PROCEDURE — 85025 COMPLETE CBC W/AUTO DIFF WBC: CPT | Performed by: FAMILY MEDICINE

## 2022-04-11 RX ORDER — MECLIZINE HYDROCHLORIDE 25 MG/1
25 TABLET ORAL 3 TIMES DAILY PRN
Qty: 30 TABLET | Refills: 2 | Status: SHIPPED | OUTPATIENT
Start: 2022-04-11

## 2022-04-11 NOTE — PROGRESS NOTES
The ABCs of the Annual Wellness Visit  Welcome to Medicare Visit    Chief Complaint   Patient presents with   • Welcome To Medicare     Subjective {   History of Present Illness:  Kourtney Siddiqi is a 72 y.o. female who presents for a  Welcome to Medicare Visit.    Vocal Cord Paralysis: s/p underwent R vocal cord surgery x4 (2 injections to R, then bilateral implants) to help stimulate this vocal cord. Patient has had good result w/ improved speech. Happy w/ current quality of life. Has been released by ENT and will simply up PRN now.      HTN: 106/68 today. Maintained on Lotrel 5/10 daily. No LH, CP, SOB, leg swelling.    Meniere's disease: patient reports some intermittent dizziness, which has been previously diagnosed as Meniere's. Watches sodium closely.     Anxiety/depression: No concerns at this time. Improved now- retired, family issues better. Maintained on escitalopram 10. She uses trazodone 100mg nightly and 1-2mg Ativan nightly for sleep.      GERD: maintained on omeprazole 40 daily. No heartburn/reflux or dysphagia on medicine.    Allergic rhinitis: maintained on allergy shots, Xyzal, Flonase, and Singulair. She continues to have some difficulty but reports these medications help a lot. Follows w/ ENT    OA: patient reports chronic arthritis of hand bilaterally. Maintained on meloxicam 15 daily, which seems to help. Happy w/ control    Reports itchy scalp- will try Head and Shoulders    The following portions of the patient's history were reviewed and   updated as appropriate: allergies, current medications, past family history, past medical history, past social history, past surgical history and problem list.     Compared to one year ago, the patient feels her physical   health is better.    Compared to one year ago, the patient feels her mental   health is the same.    Recent Hospitalizations:  She was admitted within the past 365 days at 1 hospital.   2/2022- unstable angina. CP r/o w/ negative cath.      Current Medical Providers:  Patient Care Team:  Bert Shultz MD as PCP - General (Internal Medicine)  Dale Rawls MD as Consulting Physician (Cardiology)    Outpatient Medications Prior to Visit   Medication Sig Dispense Refill   • ALLERGY SERUM INJECTION Inject  under the skin into the appropriate area as directed 1 (One) Time.     • ascorbic acid (VITAMIN C) 500 MG tablet Take 1 tablet by mouth Daily.     • aspirin 325 MG tablet Take 325 mg by mouth Daily.     • Calcium Carbonate-Vit D-Min (Calcium 600+D3 Plus Minerals) 600-800 MG-UNIT tablet Take 1 capsule by mouth Daily.     • escitalopram (LEXAPRO) 10 MG tablet TAKE 1 TABLET BY MOUTH EVERY DAY 90 tablet 1   • levocetirizine (XYZAL) 5 MG tablet      • LORazepam (ATIVAN) 1 MG tablet TAKE 1 TABLET BY MOUTH TWICE DAILY AS NEEDED FOR ANXIETY OR SLEEP 60 tablet 1   • meloxicam (MOBIC) 15 MG tablet TAKE 1 TABLET BY MOUTH DAILY 90 tablet 1   • montelukast (SINGULAIR) 10 MG tablet TAKE 1 TABLET BY MOUTH EVERY NIGHT 90 tablet 1   • multivitamin (THERAGRAN) tablet tablet Take 1 tablet by mouth.     • Omega-3 Fatty Acids (Fish Oil Extra Strength) 435 MG capsule      • omeprazole (priLOSEC) 40 MG capsule TAKE 1 CAPSULE BY MOUTH DAILY 90 capsule 1   • traZODone (DESYREL) 100 MG tablet TAKE 1/2 TABLET BY MOUTH NIGHTLY FOR THE FIRST WEEK THEN INCREASE TO 1 TABLET AT NIGHT 90 tablet 1   • amLODIPine-benazepril (LOTREL 5-10) 5-10 MG per capsule TAKE 1 CAPSULE BY MOUTH EVERY DAY 90 capsule 1     No facility-administered medications prior to visit.     No opioid medication identified on active medication list. I have reviewed chart for other potential  high risk medication/s and harmful drug interactions in the elderly.        Aspirin is on active medication list. Aspirin use is indicated based on review of current medical condition/s. Pros and cons of this therapy have been discussed today. Benefits of this medication outweigh potential harm.  Patient  "has been encouraged to continue taking this medication.  .    Patient Active Problem List   Diagnosis   • Colon polyps   • Depression   • Family history of malignant neoplasm of colon   • Gastroesophageal reflux disease   • Hyperlipidemia   • Hypertension   • Osteoarthritis   • Dysphonia   • Migraine aura without headache   • Vocal cord paralysis   • Anxiety   • Bilateral partial vocal cord paralysis   • Diverticulitis   • Hearing loss   • Primary insomnia   • Acute recurrent maxillary sinusitis   • Hx of motion sickness   • Unstable angina (HCC)   • Abnormal stress test   • Bilateral pseudophakia   • Keratoconjunctivitis sicca not due to Sjogren's syndrome     Advance Care Planning  Advance Directive is not on file.  ACP discussion was held with the patient during this visit. Patient does not have an advance directive, information provided.  Review of Systems   Constitutional: Negative for chills and fever.   HENT: Positive for rhinorrhea and voice change. Negative for congestion and trouble swallowing.    Eyes: Negative for visual disturbance.   Respiratory: Negative for cough and shortness of breath.    Cardiovascular: Negative for chest pain and palpitations.   Gastrointestinal: Negative for abdominal pain and vomiting.   Genitourinary: Negative for difficulty urinating and dysuria.   Musculoskeletal: Positive for arthralgias. Negative for back pain.   Skin: Positive for rash.   Neurological: Positive for dizziness. Negative for light-headedness.   Psychiatric/Behavioral: Negative for dysphoric mood and sleep disturbance. The patient is not nervous/anxious.         Objective   Vitals:    04/11/22 0801   BP: 106/68   Pulse: 71   Resp: 17   Temp: 96.6 °F (35.9 °C)   SpO2: 97%   Weight: 96.2 kg (212 lb)   Height: 162.6 cm (64\")     BMI Readings from Last 1 Encounters:   04/11/22 36.39 kg/m²   BMI is above normal parameters. Recommendations include: exercise counseling  Patient's Body mass index is 36.39 kg/m². " indicating that she is obese (BMI >30). Obesity-related health conditions include the following: hypertension, dyslipidemias, GERD and osteoarthritis. Obesity is unchanged. BMI is is above average; BMI management plan is completed. We discussed portion control and increasing exercise..    Does the patient have evidence of cognitive impairment? No    Physical Exam  Constitutional:       General: She is not in acute distress.     Appearance: She is well-developed. She is obese.   HENT:      Head: Normocephalic and atraumatic.      Right Ear: Tympanic membrane and external ear normal. There is no impacted cerumen.      Left Ear: Tympanic membrane and external ear normal. There is no impacted cerumen.      Nose: Nose normal.      Mouth/Throat:      Mouth: Mucous membranes are moist.      Pharynx: No oropharyngeal exudate or posterior oropharyngeal erythema.   Eyes:      General: No scleral icterus.        Right eye: No discharge.         Left eye: No discharge.      Extraocular Movements: Extraocular movements intact.      Conjunctiva/sclera: Conjunctivae normal.      Pupils: Pupils are equal, round, and reactive to light.   Neck:      Thyroid: No thyromegaly.      Vascular: No carotid bruit.   Cardiovascular:      Rate and Rhythm: Normal rate and regular rhythm.      Heart sounds: Normal heart sounds. No murmur heard.  Pulmonary:      Effort: Pulmonary effort is normal. No respiratory distress.      Breath sounds: Normal breath sounds. No wheezing or rales.   Abdominal:      General: Bowel sounds are normal. There is no distension.      Palpations: Abdomen is soft.      Tenderness: There is no abdominal tenderness.   Musculoskeletal:         General: No deformity. Normal range of motion.      Cervical back: Normal range of motion and neck supple.   Lymphadenopathy:      Cervical: No cervical adenopathy.   Skin:     General: Skin is warm and dry.      Findings: No rash.   Neurological:      General: No focal deficit  present.      Mental Status: She is alert and oriented to person, place, and time. Mental status is at baseline.      Cranial Nerves: No cranial nerve deficit.      Sensory: No sensory deficit.   Psychiatric:         Behavior: Behavior normal.         Thought Content: Thought content normal.         Lab Results   Component Value Date    TRIG 109 04/11/2022    HDL 53 04/11/2022    LDL 72 04/11/2022    VLDL 20 04/11/2022    HGBA1C 6.0 (H) 04/11/2022       Procedures     HEALTH RISK ASSESSMENT    Smoking Status:  Social History     Tobacco Use   Smoking Status Never Smoker   Smokeless Tobacco Never Used     Alcohol Consumption:  Social History     Substance and Sexual Activity   Alcohol Use No     Fall Risk Screen:  STEADI Fall Risk Assessment was completed, and patient is at LOW risk for falls.Assessment completed on:4/11/2022    Depression Screen:   PHQ-2/PHQ-9 Depression Screening 4/11/2022   Retired PHQ-9 Total Score -   Retired Total Score -   Little Interest or Pleasure in Doing Things 0-->not at all   Feeling Down, Depressed or Hopeless 0-->not at all   PHQ-9: Brief Depression Severity Measure Score 0     Health Habits and Functional and Cognitive Screening:  Functional & Cognitive Status 4/11/2022   Do you have difficulty preparing food and eating? No   Do you have difficulty bathing yourself, getting dressed or grooming yourself? No   Do you have difficulty using the toilet? No   Do you have difficulty moving around from place to place? No   Do you have trouble with steps or getting out of a bed or a chair? No   Current Diet Low Carb Diet   Dental Exam Up to date   Eye Exam Up to date   Exercise (times per week) 0 times per week   Current Exercises Include No Regular Exercise   Current Exercise Activities Include -   Do you need help using the phone?  No   Are you deaf or do you have serious difficulty hearing?  Yes   Do you need help with transportation? No   Do you need help shopping? No   Do you need help  preparing meals?  No   Do you need help with housework?  No   Do you need help with laundry? No   Do you need help taking your medications? No   Do you need help managing money? No   Do you ever drive or ride in a car without wearing a seat belt? No   Have you felt unusual stress, anger or loneliness in the last month? No   Who do you live with? Alone   If you need help, do you have trouble finding someone available to you? No   Have you been bothered in the last four weeks by sexual problems? No   Do you have difficulty concentrating, remembering or making decisions? No     Visual Acuity:    No exam data present    Age-appropriate Screening Schedule:  Refer to the list below for future screening recommendations based on patient's age, sex and/or medical conditions. Orders for these recommended tests are listed in the plan section. The patient has been provided with a written plan.    Health Maintenance   Topic Date Due   • DXA SCAN  Never done   • TDAP/TD VACCINES (1 - Tdap) Never done   • ZOSTER VACCINE (1 of 2) 07/29/2022 (Originally 2/2/2002)   • INFLUENZA VACCINE  08/01/2022   • LIPID PANEL  04/11/2023   • MAMMOGRAM  02/28/2024          Assessment/Plan   CMS Preventative Services Quick Reference  Risk Factors Identified During Encounter  Immunizations Discussed/Encouraged (specific Immunizations; Tdap and Influenza  Obesity/Overweight   The above risks/problems have been discussed with the patient.  Pertinent information has been shared with the patient in the After Visit Summary.  Follow up plans and orders are seen below in the Assessment/Plan Section.    Diagnoses and all orders for this visit:    1. Medicare annual wellness visit, subsequent (Primary)  -     CBC & Differential  -     Comprehensive Metabolic Panel  -     Hemoglobin A1c  -     Lipid Panel  -     TSH  -     T4, Free  -     Vitamin D 25 Hydroxy  -     Vitamin B12  -     DEXA Bone Density Axial; Future  -  Counseled regarding diet, exercise,  weight loss, and preventative health maintenance items/immunizations below    2. Vocal cord paralysis: s/p underwent R vocal cord surgery x4 (2 injections to R, then bilateral implants) to help stimulate this vocal cord   -ENT follow-up as needed    3. Primary hypertension: 106/68 today  -     Comprehensive Metabolic Panel  - Okay to trial off Lotrel 5/10 mg daily.  Monitor BP    4. Meniere's disease, unspecified laterality  -     Continue meclizine (ANTIVERT) 25 MG tablet; Take 1 tablet by mouth 3 (Three) Times a Day As Needed for Dizziness.  Dispense: 30 tablet; Refill: 2  - Monitor sodium    5. Anxiety  6. Depression, unspecified depression type   -Continue home Lexapro 10 mg daily   -Continue home trazodone 100 mg nightly and Ativan 1 mg nightly as needed    7. Gastroesophageal reflux disease, unspecified whether esophagitis present   -Continue home omeprazole 40 mg daily    8. Other osteoarthritis involving multiple joints   -Continue home meloxicam 15 daily    9. Allergic rhinitis, unspecified seasonality, unspecified trigger   -Continue ENT follow-up   -Continue home allergy shots, Xyzal, Flonase, Singulair    10. Vitamin D deficiency, unspecified   -     Vitamin D 25 Hydroxy    11. Abnormal findings on diagnostic imaging of other parts of musculoskeletal system   -     DEXA Bone Density Axial; Future    Preventative  Colonoscopy: 2018, due 2023  Mammogram: 2/2022  Pap smear: aged out, s/p hysterectomy  DEXA: Ordered today  Shingles: Completed- 2018  Pneumonia: Prevnar 2017; Pneumovax 2019  Tdap: Recommended- deferred 2/2 cost  Influenza: 10/2021, recommended  COVID: Completed 2 shots Moderna (5/2021) and illness twice    Follow Up:   Return in about 1 year (around 4/11/2023) for Medicare Wellness.     An After Visit Summary and PPPS were made available to the patient.

## 2022-04-26 DIAGNOSIS — M79.671 FOOT PAIN, RIGHT: ICD-10-CM

## 2022-04-26 RX ORDER — OMEPRAZOLE 40 MG/1
40 CAPSULE, DELAYED RELEASE ORAL DAILY
Qty: 90 CAPSULE | Refills: 1 | Status: SHIPPED | OUTPATIENT
Start: 2022-04-26 | End: 2022-10-26

## 2022-04-26 RX ORDER — MELOXICAM 15 MG/1
15 TABLET ORAL DAILY
Qty: 90 TABLET | Refills: 1 | Status: SHIPPED | OUTPATIENT
Start: 2022-04-26 | End: 2022-10-26

## 2022-04-26 RX ORDER — MONTELUKAST SODIUM 10 MG/1
10 TABLET ORAL NIGHTLY
Qty: 90 TABLET | Refills: 1 | Status: SHIPPED | OUTPATIENT
Start: 2022-04-26 | End: 2022-10-26

## 2022-04-26 RX ORDER — AMLODIPINE BESYLATE AND BENAZEPRIL HYDROCHLORIDE 5; 10 MG/1; MG/1
CAPSULE ORAL
Qty: 90 CAPSULE | Refills: 1 | OUTPATIENT
Start: 2022-04-26

## 2022-05-03 RX ORDER — AMLODIPINE BESYLATE AND BENAZEPRIL HYDROCHLORIDE 5; 10 MG/1; MG/1
CAPSULE ORAL
Qty: 90 CAPSULE | Refills: 1 | Status: SHIPPED | OUTPATIENT
Start: 2022-05-03 | End: 2022-10-26

## 2022-05-04 ENCOUNTER — HOSPITAL ENCOUNTER (OUTPATIENT)
Dept: BONE DENSITY | Facility: HOSPITAL | Age: 72
Discharge: HOME | End: 2022-05-04
Admitting: FAMILY MEDICINE

## 2022-05-04 DIAGNOSIS — R93.7 ABNORMAL FINDINGS ON DIAGNOSTIC IMAGING OF OTHER PARTS OF MUSCULOSKELETAL SYSTEM: ICD-10-CM

## 2022-05-04 DIAGNOSIS — Z00.00 MEDICARE ANNUAL WELLNESS VISIT, SUBSEQUENT: ICD-10-CM

## 2022-05-04 PROCEDURE — 77080 DXA BONE DENSITY AXIAL: CPT

## 2022-06-06 ENCOUNTER — LAB (OUTPATIENT)
Dept: FAMILY MEDICINE CLINIC | Facility: CLINIC | Age: 72
End: 2022-06-06

## 2022-06-06 DIAGNOSIS — R35.0 URINARY FREQUENCY: Primary | ICD-10-CM

## 2022-06-06 PROCEDURE — 81001 URINALYSIS AUTO W/SCOPE: CPT | Performed by: FAMILY MEDICINE

## 2022-06-06 PROCEDURE — 87088 URINE BACTERIA CULTURE: CPT | Performed by: FAMILY MEDICINE

## 2022-06-06 PROCEDURE — 87186 SC STD MICRODIL/AGAR DIL: CPT

## 2022-06-06 PROCEDURE — 87086 URINE CULTURE/COLONY COUNT: CPT | Performed by: FAMILY MEDICINE

## 2022-06-07 LAB
BACTERIA UR QL AUTO: ABNORMAL /HPF
BILIRUB UR QL STRIP: NEGATIVE
CLARITY UR: CLEAR
COLOR UR: ABNORMAL
GLUCOSE UR STRIP-MCNC: NEGATIVE MG/DL
HGB UR QL STRIP.AUTO: ABNORMAL
HYALINE CASTS UR QL AUTO: ABNORMAL /LPF
KETONES UR QL STRIP: NEGATIVE
LEUKOCYTE ESTERASE UR QL STRIP.AUTO: ABNORMAL
NITRITE UR QL STRIP: POSITIVE
PH UR STRIP.AUTO: 6 [PH] (ref 5–8)
PROT UR QL STRIP: NEGATIVE
RBC # UR STRIP: ABNORMAL /HPF
REF LAB TEST METHOD: ABNORMAL
SP GR UR STRIP: 1.02 (ref 1–1.03)
SQUAMOUS #/AREA URNS HPF: ABNORMAL /HPF
UROBILINOGEN UR QL STRIP: ABNORMAL
WBC # UR STRIP: ABNORMAL /HPF

## 2022-06-07 RX ORDER — CEPHALEXIN 500 MG/1
500 CAPSULE ORAL 2 TIMES DAILY
Qty: 14 CAPSULE | Refills: 0 | Status: SHIPPED | OUTPATIENT
Start: 2022-06-07 | End: 2022-06-14

## 2022-06-09 LAB — BACTERIA SPEC AEROBE CULT: ABNORMAL

## 2022-06-19 DIAGNOSIS — F41.9 ANXIETY: ICD-10-CM

## 2022-06-20 RX ORDER — LORAZEPAM 1 MG/1
TABLET ORAL
Qty: 60 TABLET | Refills: 1 | Status: SHIPPED | OUTPATIENT
Start: 2022-06-20 | End: 2022-09-14

## 2022-09-14 DIAGNOSIS — F41.9 ANXIETY: ICD-10-CM

## 2022-09-14 DIAGNOSIS — Z87.898 HX OF MOTION SICKNESS: ICD-10-CM

## 2022-09-14 RX ORDER — LORAZEPAM 1 MG/1
TABLET ORAL
Qty: 60 TABLET | Refills: 2 | Status: SHIPPED | OUTPATIENT
Start: 2022-09-14 | End: 2023-03-06

## 2022-09-14 RX ORDER — SCOLOPAMINE TRANSDERMAL SYSTEM 1 MG/1
PATCH, EXTENDED RELEASE TRANSDERMAL
Qty: 4 PATCH | Refills: 0 | Status: SHIPPED | OUTPATIENT
Start: 2022-09-14

## 2022-09-14 NOTE — TELEPHONE ENCOUNTER
Caller: Kourtney Siddiqi    Relationship: Self    Best call back number: [phone]    What medication are you requesting: SCOPOLAMINE BASE 1 MG FOR THREE DAYS     What are your current symptoms: MOTION SICKNESS     How long have you been experiencing symptoms: N/A    Have you had these symptoms before:    [x] Yes  [] No    Have you been treated for these symptoms before:   [x] Yes  [] No    If a prescription is needed, what is your preferred [facility] and phone number[id number] [address] [facility] [address] [facility] OF  &  - [phone] [facility] [phone] FX     Additional notes:    PATIENT IS GOING ON CRUISE AND WOULD LIKE TO HAVE THE MOTION SICKNESS PATCHES AGAIN

## 2022-09-21 RX ORDER — ESCITALOPRAM OXALATE 10 MG/1
TABLET ORAL
Qty: 90 TABLET | Refills: 1 | Status: SHIPPED | OUTPATIENT
Start: 2022-09-21 | End: 2023-01-31

## 2022-10-26 DIAGNOSIS — M79.671 FOOT PAIN, RIGHT: ICD-10-CM

## 2022-10-26 RX ORDER — MELOXICAM 15 MG/1
15 TABLET ORAL DAILY
Qty: 90 TABLET | Refills: 1 | Status: SHIPPED | OUTPATIENT
Start: 2022-10-26 | End: 2023-03-29

## 2022-10-26 RX ORDER — OMEPRAZOLE 40 MG/1
40 CAPSULE, DELAYED RELEASE ORAL DAILY
Qty: 90 CAPSULE | Refills: 1 | Status: SHIPPED | OUTPATIENT
Start: 2022-10-26 | End: 2023-03-29

## 2022-10-26 RX ORDER — MONTELUKAST SODIUM 10 MG/1
10 TABLET ORAL NIGHTLY
Qty: 90 TABLET | Refills: 1 | Status: SHIPPED | OUTPATIENT
Start: 2022-10-26

## 2022-10-26 RX ORDER — AMLODIPINE BESYLATE AND BENAZEPRIL HYDROCHLORIDE 5; 10 MG/1; MG/1
CAPSULE ORAL
Qty: 90 CAPSULE | Refills: 1 | Status: SHIPPED | OUTPATIENT
Start: 2022-10-26 | End: 2023-03-29

## 2022-11-07 ENCOUNTER — OFFICE VISIT (OUTPATIENT)
Dept: FAMILY MEDICINE CLINIC | Facility: CLINIC | Age: 72
End: 2022-11-07

## 2022-11-07 VITALS
DIASTOLIC BLOOD PRESSURE: 80 MMHG | WEIGHT: 220 LBS | OXYGEN SATURATION: 96 % | BODY MASS INDEX: 37.76 KG/M2 | SYSTOLIC BLOOD PRESSURE: 134 MMHG | HEART RATE: 85 BPM | RESPIRATION RATE: 16 BRPM

## 2022-11-07 DIAGNOSIS — J01.90 ACUTE SINUSITIS, RECURRENCE NOT SPECIFIED, UNSPECIFIED LOCATION: Primary | ICD-10-CM

## 2022-11-07 PROCEDURE — 99213 OFFICE O/P EST LOW 20 MIN: CPT | Performed by: NURSE PRACTITIONER

## 2022-11-07 RX ORDER — AMOXICILLIN AND CLAVULANATE POTASSIUM 875; 125 MG/1; MG/1
1 TABLET, FILM COATED ORAL 2 TIMES DAILY
Qty: 20 TABLET | Refills: 0 | Status: SHIPPED | OUTPATIENT
Start: 2022-11-07 | End: 2023-01-12

## 2022-11-07 NOTE — PROGRESS NOTES
Chief Complaint  Cough    Subjective          Kourtney Siddiqi presents to [facility]  History of Present Illness    Is here today with c/o 8 days h/o of uri symptoms  She is having drainage, headache, sinus tenderness with purulent rhinorrhea    Review of Systems   Constitutional: Positive for appetite change and fever.        Maybe mild fever at onset of illness  Appetite is decreased, is working on drinking plenty of fluids   HENT: Positive for ear pain, postnasal drip, sinus pressure and sinus pain.    Respiratory: Positive for cough.    Gastrointestinal: Negative.    Allergic/Immunologic: Positive for environmental allergies.        On allergy shots currently has been weaning off from them over the past few weeks  Uses a nasal steroid and an antihistamine daily   Neurological: Positive for headaches.   Psychiatric/Behavioral: Negative for sleep disturbance.       Objective   Vital Signs:  /80 (BP Location: Left arm, Patient Position: Sitting)   Pulse 85   Resp 16   Wt 99.8 kg (220 lb)   SpO2 96%   BMI 37.76 kg/m²     BP Readings from Last 3 Encounters:   11/07/22 134/80   04/11/22 106/68   03/07/22 137/87        Wt Readings from Last 3 Encounters:   11/07/22 99.8 kg (220 lb)   04/11/22 96.2 kg (212 lb)   03/07/22 99.3 kg (219 lb)              Physical Exam  Vitals reviewed.   Constitutional:       Appearance: Normal appearance.   HENT:      Right Ear: Tympanic membrane, ear canal and external ear normal.      Left Ear: Tympanic membrane, ear canal and external ear normal.      Nose: Congestion present.      Comments: Tenderness over facial sinuses     Mouth/Throat:      Mouth: Mucous membranes are moist.      Pharynx: Posterior oropharyngeal erythema present. No oropharyngeal exudate.   Eyes:      Extraocular Movements: Extraocular movements intact.   Neck:      Vascular: No carotid bruit.   Cardiovascular:      Rate and Rhythm: Normal rate and regular rhythm.       Heart sounds: Normal heart sounds.   Pulmonary:      Effort: Pulmonary effort is normal.      Breath sounds: Normal breath sounds.   Musculoskeletal:      Cervical back: Neck supple. No tenderness.   Lymphadenopathy:      Cervical: No cervical adenopathy.   Skin:     General: Skin is warm.   Neurological:      Mental Status: She is alert.        Result Review :                 Assessment and Plan    Diagnoses and all orders for this visit:    1. Acute sinusitis, recurrence not specified, unspecified location (Primary)  -     amoxicillin-clavulanate (Augmentin) 875-125 MG per tablet; Take 1 tablet by mouth 2 (Two) Times a Day.  Dispense: 20 tablet; Refill: 0           Follow Up   Return if symptoms worsen or fail to improve.  Patient was given instructions and counseling regarding her condition or for health maintenance advice. Please see specific information pulled into the AVS if appropriate.

## 2022-12-29 DIAGNOSIS — F41.1 GENERALIZED ANXIETY DISORDER: ICD-10-CM

## 2022-12-29 DIAGNOSIS — G47.00 INSOMNIA, UNSPECIFIED TYPE: ICD-10-CM

## 2022-12-29 RX ORDER — TRAZODONE HYDROCHLORIDE 100 MG/1
TABLET ORAL
Qty: 90 TABLET | Refills: 1 | Status: SHIPPED | OUTPATIENT
Start: 2022-12-29

## 2023-01-06 ENCOUNTER — TELEPHONE (OUTPATIENT)
Dept: FAMILY MEDICINE CLINIC | Facility: CLINIC | Age: 73
End: 2023-01-06
Payer: COMMERCIAL

## 2023-01-06 NOTE — TELEPHONE ENCOUNTER
Patient is having pain with urination and has been fighting this for several days already.  She said she always gets much worse and cannot go thru the weekend with medication.  Said you sometimes send an antibiotic in for her.  Will you send an antibiotic in?

## 2023-01-12 ENCOUNTER — OFFICE VISIT (OUTPATIENT)
Dept: FAMILY MEDICINE CLINIC | Facility: CLINIC | Age: 73
End: 2023-01-12
Payer: MEDICARE

## 2023-01-12 ENCOUNTER — LAB (OUTPATIENT)
Dept: FAMILY MEDICINE CLINIC | Facility: CLINIC | Age: 73
End: 2023-01-12
Payer: MEDICARE

## 2023-01-12 VITALS
BODY MASS INDEX: 37.93 KG/M2 | DIASTOLIC BLOOD PRESSURE: 76 MMHG | RESPIRATION RATE: 18 BRPM | HEART RATE: 82 BPM | SYSTOLIC BLOOD PRESSURE: 120 MMHG | WEIGHT: 221 LBS | OXYGEN SATURATION: 97 %

## 2023-01-12 DIAGNOSIS — Z87.19 HISTORY OF COLONIC DIVERTICULITIS: ICD-10-CM

## 2023-01-12 DIAGNOSIS — R10.32 LEFT LOWER QUADRANT ABDOMINAL PAIN: Primary | ICD-10-CM

## 2023-01-12 DIAGNOSIS — R10.32 LEFT LOWER QUADRANT ABDOMINAL PAIN: ICD-10-CM

## 2023-01-12 LAB
ALBUMIN SERPL-MCNC: 4.4 G/DL (ref 3.5–5.2)
ALBUMIN/GLOB SERPL: 1.5 G/DL
ALP SERPL-CCNC: 110 U/L (ref 39–117)
ALT SERPL W P-5'-P-CCNC: 20 U/L (ref 1–33)
ANION GAP SERPL CALCULATED.3IONS-SCNC: 10 MMOL/L (ref 5–15)
AST SERPL-CCNC: 20 U/L (ref 1–32)
BILIRUB SERPL-MCNC: 0.2 MG/DL (ref 0–1.2)
BUN SERPL-MCNC: 18 MG/DL (ref 8–23)
BUN/CREAT SERPL: 20.2 (ref 7–25)
CALCIUM SPEC-SCNC: 9.6 MG/DL (ref 8.6–10.5)
CHLORIDE SERPL-SCNC: 101 MMOL/L (ref 98–107)
CO2 SERPL-SCNC: 31 MMOL/L (ref 22–29)
CREAT SERPL-MCNC: 0.89 MG/DL (ref 0.57–1)
DEPRECATED RDW RBC AUTO: 41.9 FL (ref 37–54)
EGFRCR SERPLBLD CKD-EPI 2021: 69.8 ML/MIN/1.73
ERYTHROCYTE [DISTWIDTH] IN BLOOD BY AUTOMATED COUNT: 12.7 % (ref 12.3–15.4)
GLOBULIN UR ELPH-MCNC: 2.9 GM/DL
GLUCOSE SERPL-MCNC: 102 MG/DL (ref 65–99)
HCT VFR BLD AUTO: 44.1 % (ref 34–46.6)
HGB BLD-MCNC: 14.7 G/DL (ref 12–15.9)
MCH RBC QN AUTO: 30.2 PG (ref 26.6–33)
MCHC RBC AUTO-ENTMCNC: 33.3 G/DL (ref 31.5–35.7)
MCV RBC AUTO: 90.6 FL (ref 79–97)
PLATELET # BLD AUTO: 307 10*3/MM3 (ref 140–450)
PMV BLD AUTO: 8.9 FL (ref 6–12)
POTASSIUM SERPL-SCNC: 4.5 MMOL/L (ref 3.5–5.2)
PROT SERPL-MCNC: 7.3 G/DL (ref 6–8.5)
RBC # BLD AUTO: 4.87 10*6/MM3 (ref 3.77–5.28)
SODIUM SERPL-SCNC: 142 MMOL/L (ref 136–145)
WBC NRBC COR # BLD: 9.95 10*3/MM3 (ref 3.4–10.8)

## 2023-01-12 PROCEDURE — 85027 COMPLETE CBC AUTOMATED: CPT | Performed by: NURSE PRACTITIONER

## 2023-01-12 PROCEDURE — 36415 COLL VENOUS BLD VENIPUNCTURE: CPT

## 2023-01-12 PROCEDURE — 99213 OFFICE O/P EST LOW 20 MIN: CPT | Performed by: NURSE PRACTITIONER

## 2023-01-12 PROCEDURE — 80053 COMPREHEN METABOLIC PANEL: CPT | Performed by: NURSE PRACTITIONER

## 2023-01-12 RX ORDER — METRONIDAZOLE 500 MG/1
500 TABLET ORAL 3 TIMES DAILY
Qty: 30 TABLET | Refills: 0 | Status: SHIPPED | OUTPATIENT
Start: 2023-01-12

## 2023-01-12 RX ORDER — NITROFURANTOIN 25; 75 MG/1; MG/1
CAPSULE ORAL
COMMUNITY
Start: 2023-01-06

## 2023-01-12 RX ORDER — CIPROFLOXACIN 500 MG/1
500 TABLET, FILM COATED ORAL 2 TIMES DAILY
Qty: 20 TABLET | Refills: 0 | Status: SHIPPED | OUTPATIENT
Start: 2023-01-12

## 2023-01-31 RX ORDER — ESCITALOPRAM OXALATE 10 MG/1
TABLET ORAL
Qty: 90 TABLET | Refills: 1 | Status: SHIPPED | OUTPATIENT
Start: 2023-01-31

## 2023-03-06 DIAGNOSIS — F41.9 ANXIETY: ICD-10-CM

## 2023-03-07 RX ORDER — LORAZEPAM 1 MG/1
TABLET ORAL
Qty: 60 TABLET | Refills: 1 | Status: SHIPPED | OUTPATIENT
Start: 2023-03-07

## 2023-03-13 ENCOUNTER — TELEPHONE (OUTPATIENT)
Dept: FAMILY MEDICINE CLINIC | Facility: CLINIC | Age: 73
End: 2023-03-13
Payer: COMMERCIAL

## 2023-03-13 NOTE — TELEPHONE ENCOUNTER
Caller: Kourtney Siddiqi    Relationship to patient: Self    Best call back number: [phone]    Chief complaint: MWV    Type of visit: SUBSEQUENT MEDICARE WELLNESS     Requested date: April OR MAY    If rescheduling, when is the original appointment: 4/14/23     Additional notes: PATIENT STATES SHE DID NOT KNOW HER  APPOINTMENT NEEDED TO BE RESCHEDULED BUT DOES NOT WANT TO WAIT UNTIL 8/8/23 FOR AN APPOINTMENT.     PATIENT STATES SHE WOULD LIKE TO BE SEEN IN April OR MAY BUT THE WEEK OF MAY 13TH-20TH SHE WILL BE GONE.

## 2023-03-29 DIAGNOSIS — M79.671 FOOT PAIN, RIGHT: ICD-10-CM

## 2023-03-29 RX ORDER — AMLODIPINE BESYLATE AND BENAZEPRIL HYDROCHLORIDE 5; 10 MG/1; MG/1
CAPSULE ORAL
Qty: 90 CAPSULE | Refills: 1 | Status: SHIPPED | OUTPATIENT
Start: 2023-03-29

## 2023-03-29 RX ORDER — MELOXICAM 15 MG/1
15 TABLET ORAL DAILY
Qty: 90 TABLET | Refills: 1 | Status: SHIPPED | OUTPATIENT
Start: 2023-03-29

## 2023-03-29 RX ORDER — OMEPRAZOLE 40 MG/1
40 CAPSULE, DELAYED RELEASE ORAL DAILY
Qty: 90 CAPSULE | Refills: 1 | Status: SHIPPED | OUTPATIENT
Start: 2023-03-29

## 2023-04-20 ENCOUNTER — LAB (OUTPATIENT)
Dept: FAMILY MEDICINE CLINIC | Facility: CLINIC | Age: 73
End: 2023-04-20
Payer: MEDICARE

## 2023-04-20 ENCOUNTER — OFFICE VISIT (OUTPATIENT)
Dept: FAMILY MEDICINE CLINIC | Facility: CLINIC | Age: 73
End: 2023-04-20
Payer: MEDICARE

## 2023-04-20 VITALS
HEIGHT: 64 IN | OXYGEN SATURATION: 97 % | HEART RATE: 71 BPM | SYSTOLIC BLOOD PRESSURE: 112 MMHG | WEIGHT: 220.4 LBS | BODY MASS INDEX: 37.63 KG/M2 | RESPIRATION RATE: 16 BRPM | DIASTOLIC BLOOD PRESSURE: 72 MMHG

## 2023-04-20 DIAGNOSIS — I10 PRIMARY HYPERTENSION: ICD-10-CM

## 2023-04-20 DIAGNOSIS — M19.90 OSTEOARTHRITIS, UNSPECIFIED OSTEOARTHRITIS TYPE, UNSPECIFIED SITE: ICD-10-CM

## 2023-04-20 DIAGNOSIS — Z00.00 MEDICARE ANNUAL WELLNESS VISIT, SUBSEQUENT: Primary | ICD-10-CM

## 2023-04-20 DIAGNOSIS — Z12.31 ENCOUNTER FOR SCREENING MAMMOGRAM FOR MALIGNANT NEOPLASM OF BREAST: ICD-10-CM

## 2023-04-20 DIAGNOSIS — F41.9 ANXIETY AND DEPRESSION: ICD-10-CM

## 2023-04-20 DIAGNOSIS — J38.02 BILATERAL PARTIAL VOCAL CORD PARALYSIS: ICD-10-CM

## 2023-04-20 DIAGNOSIS — M65.352 TRIGGER LITTLE FINGER OF LEFT HAND: ICD-10-CM

## 2023-04-20 DIAGNOSIS — J30.9 ALLERGIC RHINITIS, UNSPECIFIED SEASONALITY, UNSPECIFIED TRIGGER: ICD-10-CM

## 2023-04-20 DIAGNOSIS — K21.9 GASTROESOPHAGEAL REFLUX DISEASE, UNSPECIFIED WHETHER ESOPHAGITIS PRESENT: ICD-10-CM

## 2023-04-20 DIAGNOSIS — B35.1 ONYCHOMYCOSIS: ICD-10-CM

## 2023-04-20 DIAGNOSIS — E55.9 VITAMIN D DEFICIENCY, UNSPECIFIED: ICD-10-CM

## 2023-04-20 DIAGNOSIS — H81.09 MENIERE'S DISEASE, UNSPECIFIED LATERALITY: ICD-10-CM

## 2023-04-20 DIAGNOSIS — F32.A ANXIETY AND DEPRESSION: ICD-10-CM

## 2023-04-20 PROBLEM — J38.00 VOCAL CORD PARALYSIS: Status: RESOLVED | Noted: 2020-11-03 | Resolved: 2023-04-20

## 2023-04-20 PROBLEM — I20.0 UNSTABLE ANGINA: Status: RESOLVED | Noted: 2022-02-01 | Resolved: 2023-04-20

## 2023-04-20 LAB
25(OH)D3 SERPL-MCNC: 104 NG/ML (ref 30–100)
ALBUMIN SERPL-MCNC: 4.5 G/DL (ref 3.5–5.2)
ALBUMIN/GLOB SERPL: 1.4 G/DL
ALP SERPL-CCNC: 106 U/L (ref 39–117)
ALT SERPL W P-5'-P-CCNC: 21 U/L (ref 1–33)
ANION GAP SERPL CALCULATED.3IONS-SCNC: 12.5 MMOL/L (ref 5–15)
AST SERPL-CCNC: 22 U/L (ref 1–32)
BASOPHILS # BLD AUTO: 0.07 10*3/MM3 (ref 0–0.2)
BASOPHILS NFR BLD AUTO: 0.8 % (ref 0–1.5)
BILIRUB SERPL-MCNC: 0.4 MG/DL (ref 0–1.2)
BUN SERPL-MCNC: 15 MG/DL (ref 8–23)
BUN/CREAT SERPL: 17.4 (ref 7–25)
CALCIUM SPEC-SCNC: 10.3 MG/DL (ref 8.6–10.5)
CHLORIDE SERPL-SCNC: 101 MMOL/L (ref 98–107)
CHOLEST SERPL-MCNC: 205 MG/DL (ref 0–200)
CO2 SERPL-SCNC: 29.5 MMOL/L (ref 22–29)
CREAT SERPL-MCNC: 0.86 MG/DL (ref 0.57–1)
DEPRECATED RDW RBC AUTO: 42.8 FL (ref 37–54)
EGFRCR SERPLBLD CKD-EPI 2021: 72.3 ML/MIN/1.73
EOSINOPHIL # BLD AUTO: 0.2 10*3/MM3 (ref 0–0.4)
EOSINOPHIL NFR BLD AUTO: 2.3 % (ref 0.3–6.2)
ERYTHROCYTE [DISTWIDTH] IN BLOOD BY AUTOMATED COUNT: 12.9 % (ref 12.3–15.4)
GLOBULIN UR ELPH-MCNC: 3.3 GM/DL
GLUCOSE SERPL-MCNC: 118 MG/DL (ref 65–99)
HBA1C MFR BLD: 6.1 % (ref 4.8–5.6)
HCT VFR BLD AUTO: 44.9 % (ref 34–46.6)
HDLC SERPL-MCNC: 58 MG/DL (ref 40–60)
HGB BLD-MCNC: 15.2 G/DL (ref 12–15.9)
IMM GRANULOCYTES # BLD AUTO: 0.03 10*3/MM3 (ref 0–0.05)
IMM GRANULOCYTES NFR BLD AUTO: 0.3 % (ref 0–0.5)
LDLC SERPL CALC-MCNC: 116 MG/DL (ref 0–100)
LDLC/HDLC SERPL: 1.93 {RATIO}
LYMPHOCYTES # BLD AUTO: 2.22 10*3/MM3 (ref 0.7–3.1)
LYMPHOCYTES NFR BLD AUTO: 25.8 % (ref 19.6–45.3)
MCH RBC QN AUTO: 30.9 PG (ref 26.6–33)
MCHC RBC AUTO-ENTMCNC: 33.9 G/DL (ref 31.5–35.7)
MCV RBC AUTO: 91.3 FL (ref 79–97)
MONOCYTES # BLD AUTO: 0.75 10*3/MM3 (ref 0.1–0.9)
MONOCYTES NFR BLD AUTO: 8.7 % (ref 5–12)
NEUTROPHILS NFR BLD AUTO: 5.34 10*3/MM3 (ref 1.7–7)
NEUTROPHILS NFR BLD AUTO: 62.1 % (ref 42.7–76)
NRBC BLD AUTO-RTO: 0 /100 WBC (ref 0–0.2)
PLATELET # BLD AUTO: 325 10*3/MM3 (ref 140–450)
PMV BLD AUTO: 8.8 FL (ref 6–12)
POTASSIUM SERPL-SCNC: 4.4 MMOL/L (ref 3.5–5.2)
PROT SERPL-MCNC: 7.8 G/DL (ref 6–8.5)
RBC # BLD AUTO: 4.92 10*6/MM3 (ref 3.77–5.28)
SODIUM SERPL-SCNC: 143 MMOL/L (ref 136–145)
T4 FREE SERPL-MCNC: 1.33 NG/DL (ref 0.93–1.7)
TRIGL SERPL-MCNC: 176 MG/DL (ref 0–150)
TSH SERPL DL<=0.05 MIU/L-ACNC: 1.03 UIU/ML (ref 0.27–4.2)
VIT B12 BLD-MCNC: >2000 PG/ML (ref 211–946)
VLDLC SERPL-MCNC: 31 MG/DL (ref 5–40)
WBC NRBC COR # BLD: 8.61 10*3/MM3 (ref 3.4–10.8)

## 2023-04-20 PROCEDURE — 85025 COMPLETE CBC W/AUTO DIFF WBC: CPT | Performed by: FAMILY MEDICINE

## 2023-04-20 PROCEDURE — 36415 COLL VENOUS BLD VENIPUNCTURE: CPT | Performed by: FAMILY MEDICINE

## 2023-04-20 PROCEDURE — 80053 COMPREHEN METABOLIC PANEL: CPT | Performed by: FAMILY MEDICINE

## 2023-04-20 PROCEDURE — 80061 LIPID PANEL: CPT | Performed by: FAMILY MEDICINE

## 2023-04-20 PROCEDURE — 84439 ASSAY OF FREE THYROXINE: CPT | Performed by: FAMILY MEDICINE

## 2023-04-20 PROCEDURE — 83036 HEMOGLOBIN GLYCOSYLATED A1C: CPT | Performed by: FAMILY MEDICINE

## 2023-04-20 PROCEDURE — 82607 VITAMIN B-12: CPT | Performed by: FAMILY MEDICINE

## 2023-04-20 PROCEDURE — 82306 VITAMIN D 25 HYDROXY: CPT | Performed by: FAMILY MEDICINE

## 2023-04-20 PROCEDURE — 84443 ASSAY THYROID STIM HORMONE: CPT | Performed by: FAMILY MEDICINE

## 2023-04-20 RX ORDER — ASPIRIN 81 MG/1
81 TABLET ORAL DAILY
COMMUNITY

## 2023-04-20 NOTE — PROGRESS NOTES
The ABCs of the Annual Wellness Visit  Subsequent Medicare Wellness Visit    Subjective    Kourtney Siddiqi is a 73 y.o. female who presents for a Subsequent Medicare Wellness Visit.    The following portions of the patient's history were reviewed and   updated as appropriate: allergies, current medications, past family history, past medical history, past social history, past surgical history and problem list.    Compared to one year ago, the patient feels her physical   health is the same.    Compared to one year ago, the patient feels her mental   health is the same.    Recent Hospitalizations:  She was not admitted to the hospital during the last year.     Current Medical Providers:  Patient Care Team:  Bert Suhltz MD as PCP - General (Internal Medicine)  Dale Rawls MD as Consulting Physician (Cardiology)    Outpatient Medications Prior to Visit   Medication Sig Dispense Refill   • ALLERGY SERUM INJECTION Inject  under the skin into the appropriate area as directed 1 (One) Time.     • amLODIPine-benazepril (LOTREL 5-10) 5-10 MG per capsule TAKE 1 CAPSULE BY MOUTH EVERY DAY 90 capsule 1   • ascorbic acid (VITAMIN C) 500 MG tablet Take 1 tablet by mouth Daily.     • aspirin 81 MG EC tablet Take 1 tablet by mouth Daily.     • Calcium Carbonate-Vit D-Min (Calcium 600+D3 Plus Minerals) 600-800 MG-UNIT tablet Take 1 capsule by mouth Daily.     • escitalopram (LEXAPRO) 10 MG tablet TAKE 1 TABLET BY MOUTH EVERY DAY 90 tablet 1   • levocetirizine (XYZAL) 5 MG tablet      • LORazepam (ATIVAN) 1 MG tablet TAKE 1 TABLET BY MOUTH TWICE DAILY AS NEEDED FOR ANXIETY OR SLEEP 60 tablet 1   • meclizine (ANTIVERT) 25 MG tablet Take 1 tablet by mouth 3 (Three) Times a Day As Needed for Dizziness. 30 tablet 2   • meloxicam (MOBIC) 15 MG tablet TAKE 1 TABLET BY MOUTH DAILY 90 tablet 1   • montelukast (SINGULAIR) 10 MG tablet TAKE 1 TABLET BY MOUTH EVERY NIGHT 90 tablet 1   • multivitamin (THERAGRAN) tablet  tablet Take 1 tablet by mouth.     • Omega-3 Fatty Acids (Fish Oil Extra Strength) 435 MG capsule      • omeprazole (priLOSEC) 40 MG capsule TAKE 1 CAPSULE BY MOUTH DAILY 90 capsule 1   • traZODone (DESYREL) 100 MG tablet TAKE 1/2 TABLET BY MOUTH NIGHTLY FOR THE FIRST WEEK, THEN INCREASE TO 1 TABLET AT NIGHT 90 tablet 1   • aspirin 325 MG tablet Take 325 mg by mouth Daily. (Patient not taking: Reported on 4/20/2023)     • ciprofloxacin (Cipro) 500 MG tablet Take 1 tablet by mouth 2 (Two) Times a Day. (Patient not taking: Reported on 4/20/2023) 20 tablet 0   • metroNIDAZOLE (Flagyl) 500 MG tablet Take 1 tablet by mouth 3 (Three) Times a Day. 30 tablet 0   • nitrofurantoin, macrocrystal-monohydrate, (MACROBID) 100 MG capsule      • Scopolamine 1 MG/3DAYS patch PLACE 1 PATCH ON THE SKIN AS DIRECTED BY PROVIDER EVERY 72 HOURS (Patient not taking: Reported on 4/20/2023) 4 patch 0     No facility-administered medications prior to visit.     No opioid medication identified on active medication list. I have reviewed chart for other potential  high risk medication/s and harmful drug interactions in the elderly.        Aspirin is on active medication list. Aspirin use is indicated based on review of current medical condition/s. Pros and cons of this therapy have been discussed today. Benefits of this medication outweigh potential harm.  Patient has been encouraged to continue taking this medication.  .    Patient Active Problem List   Diagnosis   • Colon polyps   • Family history of malignant neoplasm of colon   • Gastroesophageal reflux disease   • Hyperlipidemia   • Hypertension   • Osteoarthritis   • Dysphonia   • Migraine aura without headache   • Anxiety and depression   • Bilateral partial vocal cord paralysis   • Diverticulitis   • Hearing loss   • Primary insomnia   • Acute recurrent maxillary sinusitis   • Hx of motion sickness   • Abnormal stress test   • Bilateral pseudophakia   • Keratoconjunctivitis sicca not due  "to Sjogren's syndrome   • Meniere's disease     Advance Care Planning   Advance Care Planning     Advance Directive is not on file.  ACP discussion was held with the patient during this visit. Patient does not have an advance directive, declines further assistance.     Objective    Vitals:    23 0835   BP: 112/72   Pulse: 71   Resp: 16   SpO2: 97%   Weight: 100 kg (220 lb 6.4 oz)   Height: 162.6 cm (64\")     Estimated body mass index is 37.83 kg/m² as calculated from the following:    Height as of this encounter: 162.6 cm (64\").    Weight as of this encounter: 100 kg (220 lb 6.4 oz).    Class 2 Severe Obesity (BMI >=35 and <=39.9). Obesity-related health conditions include the following: hypertension and GERD. Obesity is unchanged. BMI is is above average; BMI management plan is completed. We discussed portion control and increasing exercise.    Does the patient have evidence of cognitive impairment? No  Lab Results   Component Value Date    TRIG 176 (H) 2023    HDL 58 2023     (H) 2023    VLDL 31 2023    HGBA1C 6.10 (H) 2023        HEALTH RISK ASSESSMENT    Smoking Status:  Social History     Tobacco Use   Smoking Status Never   Smokeless Tobacco Never     Alcohol Consumption:  Social History     Substance and Sexual Activity   Alcohol Use No     Fall Risk Screen:  STEADI Fall Risk Assessment was completed, and patient is at LOW risk for falls.Assessment completed on:2023    Depression Screenin/20/2023     8:32 AM   PHQ-2/PHQ-9 Depression Screening   Little Interest or Pleasure in Doing Things 0-->not at all   Feeling Down, Depressed or Hopeless 0-->not at all   PHQ-9: Brief Depression Severity Measure Score 0     Health Habits and Functional and Cognitive Screenin/20/2023     8:33 AM   Functional & Cognitive Status   Do you have difficulty preparing food and eating? No   Do you have difficulty bathing yourself, getting dressed or grooming yourself? " No   Do you have difficulty using the toilet? No   Do you have difficulty moving around from place to place? No   Do you have trouble with steps or getting out of a bed or a chair? No   Current Diet Well Balanced Diet   Dental Exam Up to date   Eye Exam Up to date   Exercise (times per week) 0 times per week   Current Exercises Include No Regular Exercise   Do you need help using the phone?  No   Are you deaf or do you have serious difficulty hearing?  No   Do you need help with transportation? No   Do you need help shopping? No   Do you need help preparing meals?  No   Do you need help with housework?  No   Do you need help with laundry? No   Do you need help taking your medications? No   Do you need help managing money? No   Do you ever drive or ride in a car without wearing a seat belt? No   Have you felt unusual stress, anger or loneliness in the last month? No   Who do you live with? Alone   If you need help, do you have trouble finding someone available to you? No   Do you have difficulty concentrating, remembering or making decisions? No     Age-appropriate Screening Schedule:  Refer to the list below for future screening recommendations based on patient's age, sex and/or medical conditions. Orders for these recommended tests are listed in the plan section. The patient has been provided with a written plan.    Health Maintenance   Topic Date Due   • TDAP/TD VACCINES (1 - Tdap) Never done   • ZOSTER VACCINE (1 of 2) Never done   • COVID-19 Vaccine (3 - Booster for Moderna series) 07/06/2021   • INFLUENZA VACCINE  08/01/2023   • MAMMOGRAM  02/28/2024   • ANNUAL WELLNESS VISIT  04/20/2024   • LIPID PANEL  04/20/2024   • DXA SCAN  05/04/2024   • COLORECTAL CANCER SCREENING  08/17/2028   • HEPATITIS C SCREENING  Completed   • Pneumococcal Vaccine 65+  Completed        CMS Preventative Services Quick Reference  Risk Factors Identified During Encounter  Immunizations Discussed/Encouraged: Tdap and Influenza  The  above risks/problems have been discussed with the patient.  Pertinent information has been shared with the patient in the After Visit Summary.  An After Visit Summary and PPPS were made available to the patient.    Preventative  Colonoscopy: 8/2018, due 8/2023. Ordered today  Mammogram: 2/2022. Ordered today  Pap smear: aged out, s/p hysterectomy  DEXA: 5/2022- normal  Shingles: Zostavax 2016  Pneumonia: Prevnar 2017; Pneumovax 2019  Tdap: Recommended- deferred 2/2 cost  Influenza: 10/2022, recommended  COVID: Completed 3 shots Moderna (9/2022) and illness twice    Follow Up:   Next Medicare Wellness visit to be scheduled in 1 year.       Additional E&M Note during same encounter follows:  Patient has multiple medical problems which are significant and separately identifiable that require additional work above and beyond the Medicare Wellness Visit.      Chief Complaint  Medicare Wellness-subsequent    Subjective        HPI  Kourtney Siddiqi is also being seen today for multiple medical problems    Vocal Cord Paralysis: s/p underwent R vocal cord surgery x4 (2 injections to R, then bilateral implants) to help stimulate this vocal cord. Patient has had good result w/ improved speech. Happy w/ current quality of life. Has been released by ENT but will f/u annually     HTN: 112/72 today. Maintained on Lotrel 5/10 daily. No LH/dizziness, CP, SOB, leg swelling.     Meniere's disease: well controlled. No episodes in over 1 year. Watches sodium closely.     Anxiety/depression: No concerns at this time. Previously exacerbated by work and oldest son. Improved now- retired, family issues better. Previously cried a lot but no longer. Maintained on escitalopram 10. She uses trazodone 50mg nightly and 1mg Ativan nightly for sleep. Might taken another 0.5-1 Ativan during the day PRN     GERD: maintained on omeprazole 40 daily. Rare heartburn when eating provoking foods. Reports some intermittent dysphagia on various  "foods.     Allergic rhinitis: maintained on weekly allergy shots, Xyzal, Flonase, and Singulair. She continues to have some difficulty but reports these medications help a lot. Follows w/ ENT     OA: patient reports chronic arthritis of hand bilaterally as well as knees. Maintained on meloxicam 15 daily, which seems to help. Reports triggering of L 4th and 5th digit    Nail discoloration involving R 2nd digit. Reports dark line to nail    Review of Systems   Constitutional: Negative for chills and fever.   HENT: Positive for congestion, rhinorrhea and trouble swallowing.    Eyes: Negative for visual disturbance.   Respiratory: Negative for cough and shortness of breath.    Cardiovascular: Negative for chest pain, palpitations and leg swelling.   Gastrointestinal: Negative for abdominal pain and vomiting.        +heartburn   Endocrine: Positive for cold intolerance.   Genitourinary: Negative for difficulty urinating and dysuria.   Musculoskeletal: Positive for arthralgias. Negative for back pain.   Skin: Positive for rash.   Neurological: Negative for dizziness and light-headedness.   Psychiatric/Behavioral: Negative for sleep disturbance. The patient is not nervous/anxious.      Objective   Vital Signs:  /72   Pulse 71   Resp 16   Ht 162.6 cm (64\")   Wt 100 kg (220 lb 6.4 oz)   SpO2 97%   BMI 37.83 kg/m²     Physical Exam  Constitutional:       General: She is not in acute distress.     Appearance: She is well-developed. She is obese.   HENT:      Head: Normocephalic and atraumatic.      Right Ear: Tympanic membrane and external ear normal. There is no impacted cerumen.      Left Ear: Tympanic membrane and external ear normal. There is no impacted cerumen.      Nose: Nose normal.      Mouth/Throat:      Mouth: Mucous membranes are moist.      Pharynx: No oropharyngeal exudate or posterior oropharyngeal erythema.   Eyes:      General: No scleral icterus.        Right eye: No discharge.         Left eye: " No discharge.      Extraocular Movements: Extraocular movements intact.      Conjunctiva/sclera: Conjunctivae normal.      Pupils: Pupils are equal, round, and reactive to light.   Neck:      Thyroid: No thyromegaly.      Vascular: No carotid bruit.   Cardiovascular:      Rate and Rhythm: Normal rate and regular rhythm.      Heart sounds: Normal heart sounds. No murmur heard.  Pulmonary:      Effort: Pulmonary effort is normal. No respiratory distress.      Breath sounds: Normal breath sounds. No wheezing or rales.   Abdominal:      General: Bowel sounds are normal. There is no distension.      Palpations: Abdomen is soft.      Tenderness: There is no abdominal tenderness.   Musculoskeletal:         General: No deformity. Normal range of motion.      Cervical back: Normal range of motion and neck supple.   Lymphadenopathy:      Cervical: No cervical adenopathy.   Skin:     General: Skin is warm and dry.      Findings: Lesion (discoloration of R 2nd digit) present. No rash.   Neurological:      General: No focal deficit present.      Mental Status: She is alert and oriented to person, place, and time. Mental status is at baseline.      Cranial Nerves: No cranial nerve deficit.      Sensory: No sensory deficit.   Psychiatric:         Behavior: Behavior normal.         Thought Content: Thought content normal.             Assessment and Plan   Diagnoses and all orders for this visit:    1. Medicare annual wellness visit, subsequent (Primary)  -     Ambulatory Referral For Screening Colonoscopy  -     CBC & Differential  -     Comprehensive Metabolic Panel  -     Hemoglobin A1c  -     Lipid Panel  -     TSH  -     T4, Free  -     Vitamin D,25-Hydroxy  -     Vitamin B12  -     Mammo Screening Digital Tomosynthesis Bilateral With CAD; Future  -  Counseled regarding diet, exercise, weight loss, and preventative health maintenance items/immunizations below    2. Bilateral partial vocal cord paralysis: s/p underwent R vocal  cord surgery x4 (2 injections to R, then bilateral implants) to help stimulate this vocal cord   -Continue ENT follow-up    3. Primary hypertension: 112/72 today  -     Comprehensive Metabolic Panel  - Continue home amlodipine/benazepril 5/10 mg daily    4. Meniere's disease, unspecified laterality: Well-controlled   -Continue low-sodium diet with meclizine as needed    5. Anxiety and depression   -Continue home Lexapro 10 mg daily and trazodone 100 mg nightly    6. Gastroesophageal reflux disease, unspecified whether esophagitis present: Reports persistent dysphagia  -     Ambulatory referral for Screening EGD  - Continue home omeprazole 40 mg daily    7. Allergic rhinitis, unspecified seasonality, unspecified trigger   -Continue home Xyzal and Singulair daily    8. Onychomycosis  -     Start ciclopirox (PENLAC) 8 % solution; Apply  topically to the appropriate area as directed Every Night.  Dispense: 6 mL; Refill: 1    9. Osteoarthritis, unspecified osteoarthritis type, unspecified site   - Cont home meloxicam 15mg daily    10. Trigger little finger of left hand  -     Ambulatory Referral to Orthopedic Surgery    11. Vitamin D deficiency, unspecified  -     Vitamin D,25-Hydroxy    12. Encounter for screening mammogram for malignant neoplasm of breast  -     Mammo Screening Digital Tomosynthesis Bilateral With CAD; Future     I spent 38 minutes caring for Kourtney on this date of service. This time includes time spent by me in the following activities:reviewing tests, performing a medically appropriate examination and/or evaluation , counseling and educating the patient/family/caregiver, ordering medications, tests, or procedures and documenting information in the medical record  Follow Up   Return in about 1 year (around 4/20/2024) for Medicare Wellness.  Patient was given instructions and counseling regarding her condition or for health maintenance advice. Please see specific information pulled into the AVS if  appropriate.

## 2023-04-24 ENCOUNTER — TELEPHONE (OUTPATIENT)
Dept: FAMILY MEDICINE CLINIC | Facility: CLINIC | Age: 73
End: 2023-04-24
Payer: COMMERCIAL

## 2023-04-24 NOTE — TELEPHONE ENCOUNTER
HUB to Share the following:  I left patient a voicemail to call me back so I can confirm her vitamin intake. ----- Message from Paris Peck MA sent at 4/24/2023  9:03 AM EDT -----    ----- Message -----  From: Bert Shultz MD  Sent: 4/24/2023   6:53 AM EDT  To: [facility]    Labs reveal excessive VitD and B12 supplementation. Both supplements can be discontinued at this time. Blood sugar is stable with a1c 6.1. Cholesterol is mildly elevated on these labs. Will defer medication at this time but recommend working to limit fatty/greasy foods where possible and exercise as able

## 2023-05-09 DIAGNOSIS — F41.9 ANXIETY: ICD-10-CM

## 2023-05-09 RX ORDER — LORAZEPAM 1 MG/1
TABLET ORAL
Qty: 60 TABLET | Refills: 2 | Status: SHIPPED | OUTPATIENT
Start: 2023-05-09

## 2023-05-22 RX ORDER — MONTELUKAST SODIUM 10 MG/1
10 TABLET ORAL NIGHTLY
Qty: 90 TABLET | Refills: 1 | Status: SHIPPED | OUTPATIENT
Start: 2023-05-22

## 2023-06-14 PROBLEM — R13.10 DYSPHAGIA: Status: ACTIVE | Noted: 2018-08-17

## 2023-08-14 ENCOUNTER — OFFICE VISIT (OUTPATIENT)
Dept: FAMILY MEDICINE CLINIC | Facility: CLINIC | Age: 73
End: 2023-08-14
Payer: MEDICARE

## 2023-08-14 VITALS
WEIGHT: 227 LBS | HEIGHT: 64 IN | OXYGEN SATURATION: 98 % | BODY MASS INDEX: 38.76 KG/M2 | SYSTOLIC BLOOD PRESSURE: 118 MMHG | HEART RATE: 70 BPM | DIASTOLIC BLOOD PRESSURE: 84 MMHG | RESPIRATION RATE: 16 BRPM

## 2023-08-14 DIAGNOSIS — J01.00 ACUTE NON-RECURRENT MAXILLARY SINUSITIS: Primary | ICD-10-CM

## 2023-08-14 PROCEDURE — 3079F DIAST BP 80-89 MM HG: CPT | Performed by: STUDENT IN AN ORGANIZED HEALTH CARE EDUCATION/TRAINING PROGRAM

## 2023-08-14 PROCEDURE — 99213 OFFICE O/P EST LOW 20 MIN: CPT | Performed by: STUDENT IN AN ORGANIZED HEALTH CARE EDUCATION/TRAINING PROGRAM

## 2023-08-14 PROCEDURE — 3074F SYST BP LT 130 MM HG: CPT | Performed by: STUDENT IN AN ORGANIZED HEALTH CARE EDUCATION/TRAINING PROGRAM

## 2023-08-14 RX ORDER — PREDNISONE 20 MG/1
40 TABLET ORAL DAILY
Qty: 10 TABLET | Refills: 0 | Status: SHIPPED | OUTPATIENT
Start: 2023-08-14 | End: 2023-08-19

## 2023-08-14 RX ORDER — AMOXICILLIN AND CLAVULANATE POTASSIUM 875; 125 MG/1; MG/1
1 TABLET, FILM COATED ORAL 2 TIMES DAILY
Qty: 14 TABLET | Refills: 0 | Status: SHIPPED | OUTPATIENT
Start: 2023-08-14 | End: 2023-08-21

## 2023-08-14 NOTE — PROGRESS NOTES
"Chief Complaint  Chief Complaint   Patient presents with    Headache    Sinus Problem    Facial Pain     Subjective        Kourtney Siddiqi is a 73 y.o. female who presents to [facility].    History of Present Illness  HA / sinus / facial pain: symptoms x 3 wks.  Face swelling.  Has gotten better then will come back worse.  Mild cough.  No fevers currently.  Eating and drinking okay.  Lots and lots of drainage.  Has some nausea associated with the drainage.      Objective   /84   Pulse 70   Resp 16   Ht 162.6 cm (64\")   Wt 103 kg (227 lb)   SpO2 98%   BMI 38.96 kg/mý     Estimated body mass index is 38.96 kg/mý as calculated from the following:    Height as of this encounter: 162.6 cm (64\").    Weight as of this encounter: 103 kg (227 lb).     Physical Exam   GEN: In no acute distress, fatigued appearing  HEENT: Pupils equal and reactive to light, sclera clear. Mucous membranes moist. Oropharynx without erythema or exudate.  Mild submandibular lymphadenopathy.  Tenderness to palpation over frontal and maxillary areas.  CV: Regular rate and rhythm, no murmurs  RESP: Lungs clear to auscultation anteriorly and posteriorly in all lung fields bilaterally.  No increased work of breathing.    Result Review :              Assessment and Plan     Diagnoses and all orders for this visit:    1. Acute non-recurrent maxillary sinusitis (Primary)  Symptoms present x3 weeks indicate indicate likely bacterial sinus infection.  Treat with Augmentin twice daily x7 days.  We will add prednisone 40 mg daily x5 days for swelling.  Continue daily Flonase.  Encourage plenty of fluid intake.  -     amoxicillin-clavulanate (AUGMENTIN) 875-125 MG per tablet; Take 1 tablet by mouth 2 (Two) Times a Day for 7 days.  Dispense: 14 tablet; Refill: 0  -     predniSONE (DELTASONE) 20 MG tablet; Take 2 tablets by mouth Daily for 5 days.  Dispense: 10 tablet; Refill: 0         "

## 2023-08-29 ENCOUNTER — OFFICE (AMBULATORY)
Dept: URBAN - METROPOLITAN AREA PATHOLOGY 4 | Facility: PATHOLOGY | Age: 73
End: 2023-08-29

## 2023-08-29 ENCOUNTER — OFFICE (AMBULATORY)
Dept: URBAN - METROPOLITAN AREA PATHOLOGY 4 | Facility: PATHOLOGY | Age: 73
End: 2023-08-29
Payer: COMMERCIAL

## 2023-08-29 ENCOUNTER — ON CAMPUS - OUTPATIENT (AMBULATORY)
Dept: URBAN - METROPOLITAN AREA HOSPITAL 2 | Facility: HOSPITAL | Age: 73
End: 2023-08-29

## 2023-08-29 VITALS
DIASTOLIC BLOOD PRESSURE: 75 MMHG | HEIGHT: 66 IN | DIASTOLIC BLOOD PRESSURE: 78 MMHG | HEART RATE: 59 BPM | SYSTOLIC BLOOD PRESSURE: 123 MMHG | OXYGEN SATURATION: 96 % | OXYGEN SATURATION: 95 % | SYSTOLIC BLOOD PRESSURE: 115 MMHG | HEART RATE: 77 BPM | DIASTOLIC BLOOD PRESSURE: 70 MMHG | SYSTOLIC BLOOD PRESSURE: 138 MMHG | RESPIRATION RATE: 16 BRPM | SYSTOLIC BLOOD PRESSURE: 101 MMHG | WEIGHT: 227 LBS | OXYGEN SATURATION: 97 % | HEART RATE: 60 BPM | HEART RATE: 76 BPM | SYSTOLIC BLOOD PRESSURE: 134 MMHG | HEART RATE: 84 BPM | DIASTOLIC BLOOD PRESSURE: 63 MMHG | HEART RATE: 63 BPM | SYSTOLIC BLOOD PRESSURE: 117 MMHG | OXYGEN SATURATION: 99 % | RESPIRATION RATE: 18 BRPM | HEART RATE: 67 BPM | RESPIRATION RATE: 15 BRPM | OXYGEN SATURATION: 100 % | HEART RATE: 71 BPM | SYSTOLIC BLOOD PRESSURE: 112 MMHG | DIASTOLIC BLOOD PRESSURE: 88 MMHG | TEMPERATURE: 97 F | DIASTOLIC BLOOD PRESSURE: 67 MMHG | DIASTOLIC BLOOD PRESSURE: 58 MMHG | SYSTOLIC BLOOD PRESSURE: 114 MMHG | RESPIRATION RATE: 17 BRPM | HEART RATE: 74 BPM | SYSTOLIC BLOOD PRESSURE: 107 MMHG | DIASTOLIC BLOOD PRESSURE: 69 MMHG | DIASTOLIC BLOOD PRESSURE: 86 MMHG

## 2023-08-29 DIAGNOSIS — K57.30 DIVERTICULOSIS OF LARGE INTESTINE WITHOUT PERFORATION OR ABS: ICD-10-CM

## 2023-08-29 DIAGNOSIS — D12.3 BENIGN NEOPLASM OF TRANSVERSE COLON: ICD-10-CM

## 2023-08-29 DIAGNOSIS — Z86.010 PERSONAL HISTORY OF COLONIC POLYPS: ICD-10-CM

## 2023-08-29 DIAGNOSIS — R13.10 DYSPHAGIA, UNSPECIFIED: ICD-10-CM

## 2023-08-29 DIAGNOSIS — K31.819 ANGIODYSPLASIA OF STOMACH AND DUODENUM WITHOUT BLEEDING: ICD-10-CM

## 2023-08-29 PROBLEM — K63.5 POLYP OF COLON: Status: ACTIVE | Noted: 2023-08-29

## 2023-08-29 LAB
GI HISTOLOGY: A. UNSPECIFIED: (no result)
GI HISTOLOGY: B. UNSPECIFIED: (no result)
GI HISTOLOGY: PDF REPORT: (no result)

## 2023-08-29 PROCEDURE — 43450 DILATE ESOPHAGUS 1/MULT PASS: CPT | Performed by: INTERNAL MEDICINE

## 2023-08-29 PROCEDURE — 43235 EGD DIAGNOSTIC BRUSH WASH: CPT | Performed by: INTERNAL MEDICINE

## 2023-08-29 PROCEDURE — 88305 TISSUE EXAM BY PATHOLOGIST: CPT | Mod: 26 | Performed by: INTERNAL MEDICINE

## 2023-08-29 PROCEDURE — 45385 COLONOSCOPY W/LESION REMOVAL: CPT | Mod: PT | Performed by: INTERNAL MEDICINE

## 2023-08-29 NOTE — SERVICEHPINOTES
EUGENIE PRUITT  is a  73  female   who presents today for a  EGD-Colonoscopy   for   the indications listed below. The updated Patient Profile was reviewed prior to the procedure, in conjunction with the Physical Exam, including medical conditions, surgical procedures, medications, allergies, family history and social history. See Physical Exam time stamp below for date and time of HPI completion.Pre-operatively, I reviewed the indication(s) for the procedure, the risks of the procedure [including but not limited to: unexpected bleeding possibly requiring hospitalization and/or unplanned repeat procedures, perforation possibly requiring surgical treatment, missed lesions and complications of sedation/MAC (also explained by anesthesia staff)]. I have evaluated the patient for risks associated with the planned anesthesia and the procedure to be performed and find the patient an acceptable candidate for IV sedation.Multiple opportunities were provided for any questions or concerns, and all questions were answered satisfactorily before any anesthesia was administered. We will proceed with the planned procedure.br

## 2023-09-18 ENCOUNTER — TRANSCRIBE ORDERS (OUTPATIENT)
Dept: ADMINISTRATIVE | Facility: HOSPITAL | Age: 73
End: 2023-09-18
Payer: COMMERCIAL

## 2023-09-18 DIAGNOSIS — F41.9 ANXIETY: ICD-10-CM

## 2023-09-18 DIAGNOSIS — Z12.31 BREAST CANCER SCREENING BY MAMMOGRAM: Primary | ICD-10-CM

## 2023-09-18 RX ORDER — LORAZEPAM 1 MG/1
1 TABLET ORAL 2 TIMES DAILY PRN
Qty: 60 TABLET | Refills: 2 | Status: SHIPPED | OUTPATIENT
Start: 2023-09-18

## 2023-09-18 NOTE — TELEPHONE ENCOUNTER
Caller: Kourtney Siddiqi    Relationship: Self    Best call back number: [phone]     Requested Prescriptions:   Requested Prescriptions     Pending Prescriptions Disp Refills    LORazepam (ATIVAN) 1 MG tablet 60 tablet 2     Sig: Take 1 tablet by mouth 2 (Two) Times a Day As Needed for Anxiety or Sleep.        Pharmacy where request should be sent: [id number] - [place] [address] [address] [address] AT [address] OF  & [facility], [place] - [phone] [place] [phone] FX     Last office visit with prescribing clinician: 4/20/2023   Last telemedicine visit with prescribing clinician: Visit date not found   Next office visit with prescribing clinician: 4/22/2024     Additional details provided by patient: PATIENT STATES THAT SHE HAD TO HAVE THE PRESCRIPTION FILLED WHILE OUT OF STATE, AND NOW HER REGULAR PHARMACY DOES NOT HAVE REFILLS FOR THE MEDICATION ON FILE.    Does the patient have less than a 3 day supply:  [x] Yes  [] No    Would you like a call back once the refill request has been completed: [] Yes [x] No    If the office needs to give you a call back, can they leave a voicemail: [] Yes [x] No    Renetta Glasgow Rep   09/18/23 09:27 EDT

## 2023-09-25 ENCOUNTER — HOSPITAL ENCOUNTER (OUTPATIENT)
Dept: MAMMOGRAPHY | Facility: HOSPITAL | Age: 73
Discharge: HOME | End: 2023-09-25
Admitting: FAMILY MEDICINE
Payer: MEDICARE

## 2023-09-25 DIAGNOSIS — Z12.31 BREAST CANCER SCREENING BY MAMMOGRAM: ICD-10-CM

## 2023-09-25 PROCEDURE — 77067 SCR MAMMO BI INCL CAD: CPT

## 2023-09-25 PROCEDURE — 77063 BREAST TOMOSYNTHESIS BI: CPT

## 2023-09-26 ENCOUNTER — TELEPHONE (OUTPATIENT)
Dept: FAMILY MEDICINE CLINIC | Facility: CLINIC | Age: 73
End: 2023-09-26
Payer: COMMERCIAL

## 2023-09-26 NOTE — TELEPHONE ENCOUNTER
Relay:  Left detailed voicemail for Kourtney Siddiqi as per verbal release. Advised Kourtney Siddiqi to call the office with any additional questions.    Normal mammogram. Plan to repeat next year. No further work-up needed at this time

## 2023-11-06 ENCOUNTER — TELEPHONE (OUTPATIENT)
Dept: FAMILY MEDICINE CLINIC | Facility: CLINIC | Age: 73
End: 2023-11-06
Payer: COMMERCIAL

## 2023-11-06 NOTE — TELEPHONE ENCOUNTER
Caller: Kourtney Siddiqi    Relationship: Self    Best call back number: [phone]     Which medication are you concerned about: AMLODIPINE-BENAZEPRIL        What are your concerns:   PHARMACY TOLD PATIENT THAT A PRIOR AUTHORIZATION IS NEEDED FOR THIS MEDICATION. PATIENT IS COMPLETELY OUT OF MEDICINE      [id number] - [place], [place] - [address] [address] [address] AT [facility] OF  & [facility] [place] - [phone]  - [phone] FX

## 2023-11-06 NOTE — TELEPHONE ENCOUNTER
RELAY    Left detailed message on patient's voice mail at 4pm.  SHE NEEDS TO HAVE HER PHARMACY SEND THE REJECTION TO US before we can start a PA.

## 2023-11-06 NOTE — TELEPHONE ENCOUNTER
SHE NEEDS TO HAVE HER PHARMACY SEND THE REJECTION TO US  I DO NOT HAVE ONE   AND  CAN'T UNDERSTAND WHY THIS MED WOULD NEED A PA

## 2023-11-07 RX ORDER — AMLODIPINE BESYLATE AND BENAZEPRIL HYDROCHLORIDE 5; 10 MG/1; MG/1
CAPSULE ORAL
Qty: 90 CAPSULE | Refills: 1 | Status: SHIPPED | OUTPATIENT
Start: 2023-11-07

## 2023-11-25 RX ORDER — MONTELUKAST SODIUM 10 MG/1
10 TABLET ORAL NIGHTLY
Qty: 90 TABLET | Refills: 1 | Status: SHIPPED | OUTPATIENT
Start: 2023-11-25

## 2023-11-25 RX ORDER — MONTELUKAST SODIUM 10 MG/1
10 TABLET ORAL NIGHTLY
Qty: 90 TABLET | Refills: 1 | Status: SHIPPED | OUTPATIENT
Start: 2023-11-25 | End: 2023-11-25 | Stop reason: SDUPTHER

## 2023-12-21 DIAGNOSIS — M79.671 FOOT PAIN, RIGHT: ICD-10-CM

## 2023-12-21 RX ORDER — OMEPRAZOLE 40 MG/1
40 CAPSULE, DELAYED RELEASE ORAL DAILY
Qty: 90 CAPSULE | Refills: 1 | Status: SHIPPED | OUTPATIENT
Start: 2023-12-21

## 2023-12-21 RX ORDER — MELOXICAM 15 MG/1
15 TABLET ORAL DAILY
Qty: 90 TABLET | Refills: 1 | Status: SHIPPED | OUTPATIENT
Start: 2023-12-21

## 2024-01-26 DIAGNOSIS — F41.1 GENERALIZED ANXIETY DISORDER: ICD-10-CM

## 2024-01-26 DIAGNOSIS — G47.00 INSOMNIA, UNSPECIFIED TYPE: ICD-10-CM

## 2024-01-26 RX ORDER — TRAZODONE HYDROCHLORIDE 100 MG/1
TABLET ORAL
Qty: 90 TABLET | Refills: 1 | Status: SHIPPED | OUTPATIENT
Start: 2024-01-26

## 2024-02-01 DIAGNOSIS — F41.9 ANXIETY: ICD-10-CM

## 2024-02-02 RX ORDER — LORAZEPAM 1 MG/1
1 TABLET ORAL 2 TIMES DAILY PRN
Qty: 60 TABLET | Refills: 1 | Status: SHIPPED | OUTPATIENT
Start: 2024-02-02

## 2024-02-15 RX ORDER — ESCITALOPRAM OXALATE 10 MG/1
TABLET ORAL
Qty: 90 TABLET | Refills: 1 | Status: SHIPPED | OUTPATIENT
Start: 2024-02-15

## 2024-03-25 DIAGNOSIS — F41.9 ANXIETY: ICD-10-CM

## 2024-03-25 RX ORDER — LORAZEPAM 1 MG/1
1 TABLET ORAL 2 TIMES DAILY PRN
Qty: 180 TABLET | Refills: 0 | Status: SHIPPED | OUTPATIENT
Start: 2024-03-25

## 2024-04-16 RX ORDER — AMLODIPINE BESYLATE AND BENAZEPRIL HYDROCHLORIDE 5; 10 MG/1; MG/1
CAPSULE ORAL
Qty: 90 CAPSULE | Refills: 1 | Status: SHIPPED | OUTPATIENT
Start: 2024-04-16 | End: 2024-04-22

## 2024-04-22 ENCOUNTER — OFFICE VISIT (OUTPATIENT)
Dept: FAMILY MEDICINE CLINIC | Facility: CLINIC | Age: 74
End: 2024-04-22
Payer: MEDICARE

## 2024-04-22 ENCOUNTER — LAB (OUTPATIENT)
Dept: FAMILY MEDICINE CLINIC | Facility: CLINIC | Age: 74
End: 2024-04-22
Payer: MEDICARE

## 2024-04-22 VITALS
DIASTOLIC BLOOD PRESSURE: 74 MMHG | BODY MASS INDEX: 35.65 KG/M2 | SYSTOLIC BLOOD PRESSURE: 116 MMHG | HEIGHT: 66 IN | RESPIRATION RATE: 18 BRPM | OXYGEN SATURATION: 98 % | HEART RATE: 71 BPM | WEIGHT: 221.8 LBS

## 2024-04-22 DIAGNOSIS — I10 PRIMARY HYPERTENSION: ICD-10-CM

## 2024-04-22 DIAGNOSIS — F41.9 ANXIETY AND DEPRESSION: ICD-10-CM

## 2024-04-22 DIAGNOSIS — J30.9 ALLERGIC RHINITIS, UNSPECIFIED SEASONALITY, UNSPECIFIED TRIGGER: ICD-10-CM

## 2024-04-22 DIAGNOSIS — H81.09 MENIERE'S DISEASE, UNSPECIFIED LATERALITY: ICD-10-CM

## 2024-04-22 DIAGNOSIS — R00.2 PALPITATIONS: ICD-10-CM

## 2024-04-22 DIAGNOSIS — M19.90 OSTEOARTHRITIS, UNSPECIFIED OSTEOARTHRITIS TYPE, UNSPECIFIED SITE: ICD-10-CM

## 2024-04-22 DIAGNOSIS — F32.A ANXIETY AND DEPRESSION: ICD-10-CM

## 2024-04-22 DIAGNOSIS — R61 NIGHT SWEATS: ICD-10-CM

## 2024-04-22 DIAGNOSIS — Z12.31 ENCOUNTER FOR SCREENING MAMMOGRAM FOR MALIGNANT NEOPLASM OF BREAST: ICD-10-CM

## 2024-04-22 DIAGNOSIS — Z00.00 MEDICARE ANNUAL WELLNESS VISIT, SUBSEQUENT: Primary | ICD-10-CM

## 2024-04-22 DIAGNOSIS — E55.9 VITAMIN D DEFICIENCY, UNSPECIFIED: ICD-10-CM

## 2024-04-22 DIAGNOSIS — K21.9 GASTROESOPHAGEAL REFLUX DISEASE, UNSPECIFIED WHETHER ESOPHAGITIS PRESENT: ICD-10-CM

## 2024-04-22 DIAGNOSIS — M79.89 LEG SWELLING: ICD-10-CM

## 2024-04-22 DIAGNOSIS — J38.02 BILATERAL PARTIAL VOCAL CORD PARALYSIS: ICD-10-CM

## 2024-04-22 LAB
25(OH)D3 SERPL-MCNC: 58 NG/ML (ref 30–100)
ALBUMIN SERPL-MCNC: 4.5 G/DL (ref 3.5–5.2)
ALBUMIN/GLOB SERPL: 1.6 G/DL
ALP SERPL-CCNC: 103 U/L (ref 39–117)
ALT SERPL W P-5'-P-CCNC: 28 U/L (ref 1–33)
ANION GAP SERPL CALCULATED.3IONS-SCNC: 10 MMOL/L (ref 5–15)
AST SERPL-CCNC: 24 U/L (ref 1–32)
BASOPHILS # BLD AUTO: 0.06 10*3/MM3 (ref 0–0.2)
BASOPHILS NFR BLD AUTO: 0.6 % (ref 0–1.5)
BILIRUB SERPL-MCNC: 0.2 MG/DL (ref 0–1.2)
BUN SERPL-MCNC: 10 MG/DL (ref 8–23)
BUN/CREAT SERPL: 12.7 (ref 7–25)
CALCIUM SPEC-SCNC: 9.7 MG/DL (ref 8.6–10.5)
CHLORIDE SERPL-SCNC: 101 MMOL/L (ref 98–107)
CHOLEST SERPL-MCNC: 174 MG/DL (ref 0–200)
CO2 SERPL-SCNC: 31 MMOL/L (ref 22–29)
CREAT SERPL-MCNC: 0.79 MG/DL (ref 0.57–1)
DEPRECATED RDW RBC AUTO: 42.6 FL (ref 37–54)
EGFRCR SERPLBLD CKD-EPI 2021: 79.6 ML/MIN/1.73
EOSINOPHIL # BLD AUTO: 0.16 10*3/MM3 (ref 0–0.4)
EOSINOPHIL NFR BLD AUTO: 1.6 % (ref 0.3–6.2)
ERYTHROCYTE [DISTWIDTH] IN BLOOD BY AUTOMATED COUNT: 12.6 % (ref 12.3–15.4)
GLOBULIN UR ELPH-MCNC: 2.9 GM/DL
GLUCOSE SERPL-MCNC: 101 MG/DL (ref 65–99)
HBA1C MFR BLD: 6.2 % (ref 4.8–5.6)
HCT VFR BLD AUTO: 43.5 % (ref 34–46.6)
HDLC SERPL-MCNC: 58 MG/DL (ref 40–60)
HGB BLD-MCNC: 14.5 G/DL (ref 12–15.9)
IMM GRANULOCYTES # BLD AUTO: 0.05 10*3/MM3 (ref 0–0.05)
IMM GRANULOCYTES NFR BLD AUTO: 0.5 % (ref 0–0.5)
LDLC SERPL CALC-MCNC: 91 MG/DL (ref 0–100)
LDLC/HDLC SERPL: 1.5 {RATIO}
LYMPHOCYTES # BLD AUTO: 2.13 10*3/MM3 (ref 0.7–3.1)
LYMPHOCYTES NFR BLD AUTO: 21.1 % (ref 19.6–45.3)
MCH RBC QN AUTO: 30.8 PG (ref 26.6–33)
MCHC RBC AUTO-ENTMCNC: 33.3 G/DL (ref 31.5–35.7)
MCV RBC AUTO: 92.4 FL (ref 79–97)
MONOCYTES # BLD AUTO: 0.79 10*3/MM3 (ref 0.1–0.9)
MONOCYTES NFR BLD AUTO: 7.8 % (ref 5–12)
NEUTROPHILS NFR BLD AUTO: 6.89 10*3/MM3 (ref 1.7–7)
NEUTROPHILS NFR BLD AUTO: 68.4 % (ref 42.7–76)
NRBC BLD AUTO-RTO: 0 /100 WBC (ref 0–0.2)
PATHOLOGY REVIEW: YES
PLATELET # BLD AUTO: 324 10*3/MM3 (ref 140–450)
PMV BLD AUTO: 9.1 FL (ref 6–12)
POTASSIUM SERPL-SCNC: 4.5 MMOL/L (ref 3.5–5.2)
PROT SERPL-MCNC: 7.4 G/DL (ref 6–8.5)
RBC # BLD AUTO: 4.71 10*6/MM3 (ref 3.77–5.28)
SODIUM SERPL-SCNC: 142 MMOL/L (ref 136–145)
T4 FREE SERPL-MCNC: 1.18 NG/DL (ref 0.93–1.7)
TRIGL SERPL-MCNC: 144 MG/DL (ref 0–150)
TSH SERPL DL<=0.05 MIU/L-ACNC: 0.56 UIU/ML (ref 0.27–4.2)
VIT B12 BLD-MCNC: >2000 PG/ML (ref 211–946)
VLDLC SERPL-MCNC: 25 MG/DL (ref 5–40)
WBC NRBC COR # BLD AUTO: 10.08 10*3/MM3 (ref 3.4–10.8)

## 2024-04-22 PROCEDURE — G0439 PPPS, SUBSEQ VISIT: HCPCS | Performed by: FAMILY MEDICINE

## 2024-04-22 PROCEDURE — 83036 HEMOGLOBIN GLYCOSYLATED A1C: CPT | Performed by: FAMILY MEDICINE

## 2024-04-22 PROCEDURE — 80053 COMPREHEN METABOLIC PANEL: CPT | Performed by: FAMILY MEDICINE

## 2024-04-22 PROCEDURE — 82607 VITAMIN B-12: CPT | Performed by: FAMILY MEDICINE

## 2024-04-22 PROCEDURE — 3078F DIAST BP <80 MM HG: CPT | Performed by: FAMILY MEDICINE

## 2024-04-22 PROCEDURE — 80061 LIPID PANEL: CPT | Performed by: FAMILY MEDICINE

## 2024-04-22 PROCEDURE — 84443 ASSAY THYROID STIM HORMONE: CPT | Performed by: FAMILY MEDICINE

## 2024-04-22 PROCEDURE — 3074F SYST BP LT 130 MM HG: CPT | Performed by: FAMILY MEDICINE

## 2024-04-22 PROCEDURE — 1170F FXNL STATUS ASSESSED: CPT | Performed by: FAMILY MEDICINE

## 2024-04-22 PROCEDURE — 1160F RVW MEDS BY RX/DR IN RCRD: CPT | Performed by: FAMILY MEDICINE

## 2024-04-22 PROCEDURE — 84439 ASSAY OF FREE THYROXINE: CPT | Performed by: FAMILY MEDICINE

## 2024-04-22 PROCEDURE — 99214 OFFICE O/P EST MOD 30 MIN: CPT | Performed by: FAMILY MEDICINE

## 2024-04-22 PROCEDURE — 83615 LACTATE (LD) (LDH) ENZYME: CPT | Performed by: FAMILY MEDICINE

## 2024-04-22 PROCEDURE — 36415 COLL VENOUS BLD VENIPUNCTURE: CPT | Performed by: FAMILY MEDICINE

## 2024-04-22 PROCEDURE — 82306 VITAMIN D 25 HYDROXY: CPT | Performed by: FAMILY MEDICINE

## 2024-04-22 PROCEDURE — 1159F MED LIST DOCD IN RCRD: CPT | Performed by: FAMILY MEDICINE

## 2024-04-22 PROCEDURE — 85025 COMPLETE CBC W/AUTO DIFF WBC: CPT | Performed by: FAMILY MEDICINE

## 2024-04-22 RX ORDER — FLUTICASONE PROPIONATE 50 MCG
2 SPRAY, SUSPENSION (ML) NASAL DAILY
COMMUNITY
Start: 2024-03-24

## 2024-04-22 NOTE — PROGRESS NOTES
The ABCs of the Annual Wellness Visit  Subsequent Medicare Wellness Visit    Subjective    Kourtney Siddiqi is a 74 y.o. female who presents for a Subsequent Medicare Wellness Visit.    The following portions of the patient's history were reviewed and   updated as appropriate: allergies, current medications, past family history, past medical history, past social history, past surgical history, and problem list.    Compared to one year ago, the patient feels her physical   health is the same.    Compared to one year ago, the patient feels her mental   health is the same.    Recent Hospitalizations:  She was not admitted to the hospital during the last year.     Current Medical Providers:  Patient Care Team:  Bert Shultz MD as PCP - General (Internal Medicine)  Dale Rawls MD as Consulting Physician (Cardiology)    Outpatient Medications Prior to Visit   Medication Sig Dispense Refill    ALLERGY SERUM INJECTION Inject  under the skin into the appropriate area as directed 1 (One) Time.      ascorbic acid (VITAMIN C) 500 MG tablet Take 1 tablet by mouth Daily.      aspirin 81 MG EC tablet Take 1 tablet by mouth Daily.      Calcium Carbonate-Vit D-Min (Calcium 600+D3 Plus Minerals) 600-800 MG-UNIT tablet Take 1 capsule by mouth Daily.      ciclopirox (PENLAC) 8 % solution Apply  topically to the appropriate area as directed Every Night. 6 mL 1    escitalopram (LEXAPRO) 10 MG tablet TAKE 1 TABLET BY MOUTH EVERY DAY 90 tablet 1    fluticasone (FLONASE) 50 MCG/ACT nasal spray 2 sprays into the nostril(s) as directed by provider Daily.      levocetirizine (XYZAL) 5 MG tablet       LORazepam (ATIVAN) 1 MG tablet TAKE 1 TABLET BY MOUTH TWICE DAILY AS NEEDED FOR ANXIETY OR SLEEP 180 tablet 0    meloxicam (MOBIC) 15 MG tablet TAKE 1 TABLET BY MOUTH DAILY 90 tablet 1    montelukast (SINGULAIR) 10 MG tablet TAKE 1 TABLET BY MOUTH EVERY NIGHT 90 tablet 1    multivitamin (THERAGRAN) tablet tablet Take 1 tablet  by mouth.      Omega-3 Fatty Acids (Fish Oil Extra Strength) 435 MG capsule       omeprazole (priLOSEC) 40 MG capsule TAKE 1 CAPSULE BY MOUTH DAILY 90 capsule 1    traZODone (DESYREL) 100 MG tablet TAKE 1/2 TABLET BY MOUTH NIGHTLY FOR THE FIRST WEEK, THEN INCREASE TO 1 TABLET NIGHTLY 90 tablet 1    amLODIPine-benazepril (LOTREL 5-10) 5-10 MG per capsule TAKE 1 CAPSULE BY MOUTH EVERY DAY 90 capsule 1    meclizine (ANTIVERT) 25 MG tablet Take 1 tablet by mouth 3 (Three) Times a Day As Needed for Dizziness. (Patient not taking: Reported on 4/22/2024) 30 tablet 2     No facility-administered medications prior to visit.     No opioid medication identified on active medication list. I have reviewed chart for other potential  high risk medication/s and harmful drug interactions in the elderly.      Aspirin is on active medication list. Aspirin use is indicated based on review of current medical condition/s. Pros and cons of this therapy have been discussed today. Benefits of this medication outweigh potential harm.  Patient has been encouraged to continue taking this medication.  .    Patient Active Problem List   Diagnosis    Colon polyps    Family history of malignant neoplasm of colon    Gastroesophageal reflux disease    Hyperlipidemia    Hypertension    Osteoarthritis    Dysphonia    Migraine aura without headache    Anxiety and depression    Bilateral partial vocal cord paralysis    Diverticulitis    Hearing loss    Primary insomnia    Acute recurrent maxillary sinusitis    Hx of motion sickness    Abnormal stress test    Bilateral pseudophakia    Keratoconjunctivitis sicca not due to Sjogren's syndrome    Meniere's disease     Advance Care Planning   Advance Care Planning     Advance Directive is not on file.  ACP discussion was held with the patient during this visit. Patient does not have an advance directive, information provided.     Objective    Vitals:    04/22/24 1042   BP: 116/74   Pulse: 71   Resp: 18  "  SpO2: 98%   Weight: 101 kg (221 lb 12.8 oz)   Height: 166.4 cm (65.5\")     Estimated body mass index is 36.35 kg/m² as calculated from the following:    Height as of this encounter: 166.4 cm (65.5\").    Weight as of this encounter: 101 kg (221 lb 12.8 oz).    Class 2 Severe Obesity (BMI >=35 and <=39.9). Obesity-related health conditions include the following: hypertension, dyslipidemias, GERD, and osteoarthritis. Obesity is unchanged. BMI is is above average; BMI management plan is completed. We discussed portion control and increasing exercise.    Does the patient have evidence of cognitive impairment? No        HEALTH RISK ASSESSMENT    Smoking Status:  Social History     Tobacco Use   Smoking Status Never   Smokeless Tobacco Never     Alcohol Consumption:  Social History     Substance and Sexual Activity   Alcohol Use No     Fall Risk Screen:  KAITLYNADI Fall Risk Assessment was completed, and patient is at LOW risk for falls.Assessment completed on:2024    Depression Screenin/22/2024    10:39 AM   PHQ-2/PHQ-9 Depression Screening   Little Interest or Pleasure in Doing Things 0-->not at all   Feeling Down, Depressed or Hopeless 0-->not at all   PHQ-9: Brief Depression Severity Measure Score 0     Health Habits and Functional and Cognitive Screenin/22/2024    10:37 AM   Functional & Cognitive Status   Do you have difficulty preparing food and eating? No   Do you have difficulty bathing yourself, getting dressed or grooming yourself? No   Do you have difficulty using the toilet? No   Do you have difficulty moving around from place to place? No   Do you have trouble with steps or getting out of a bed or a chair? No   Current Diet Other   Dental Exam Up to date   Eye Exam Up to date   Exercise (times per week) 5 times per week   Current Exercises Include House Cleaning;Yard Work;Walking   Do you need help using the phone?  No   Are you deaf or do you have serious difficulty hearing?  No   Do you " need help to go to places out of walking distance? No   Do you need help shopping? No   Do you need help preparing meals?  No   Do you need help with housework?  No   Do you need help with laundry? No   Do you need help taking your medications? No   Do you need help managing money? No   Do you ever drive or ride in a car without wearing a seat belt? No   Have you felt unusual stress, anger or loneliness in the last month? No   Who do you live with? Alone   If you need help, do you have trouble finding someone available to you? No   Have you been bothered in the last four weeks by sexual problems? No   Do you have difficulty concentrating, remembering or making decisions? No     Age-appropriate Screening Schedule:  Refer to the list below for future screening recommendations based on patient's age, sex and/or medical conditions. Orders for these recommended tests are listed in the plan section. The patient has been provided with a written plan.    Health Maintenance   Topic Date Due    TDAP/TD VACCINES (1 - Tdap) Never done    ZOSTER VACCINE (1 of 2) Never done    RSV Vaccine - Adults (1 - 1-dose 60+ series) Never done    COVID-19 Vaccine (4 - 2023-24 season) 09/01/2023    LIPID PANEL  04/20/2024    DXA SCAN  05/04/2024    INFLUENZA VACCINE  08/01/2024    ANNUAL WELLNESS VISIT  04/22/2025    BMI FOLLOWUP  04/22/2025    MAMMOGRAM  09/25/2025    COLORECTAL CANCER SCREENING  08/29/2033    HEPATITIS C SCREENING  Completed    Pneumococcal Vaccine 65+  Completed        CMS Preventative Services Quick Reference  Risk Factors Identified During Encounter  Immunizations Discussed/Encouraged: Tdap, Influenza, and Shingrix  The above risks/problems have been discussed with the patient.  Pertinent information has been shared with the patient in the After Visit Summary.  An After Visit Summary and PPPS were made available to the patient.    Preventative  Colonoscopy: 8/2023, 8/2030  Mammogram: 9/2023, ordered today  Pap smear:  aged out, s/p hysterectomy  DEXA: 5/2022- normal  Shingles: Zostavax 2016  Pneumonia: Prevnar 2017; Pneumovax 2019  Tdap: Recommended- deferred 2/2 cost  Influenza: 11/2023  COVID: Completed 3 shots Moderna (9/2022) and illness twice    Follow Up:   Next Medicare Wellness visit to be scheduled in 1 year.       Additional E&M Note during same encounter follows:  Patient has multiple medical problems which are significant and separately identifiable that require additional work above and beyond the Medicare Wellness Visit.      Chief Complaint  Medicare Wellness-subsequent    Subjective        HPI  Kourtney Siddiqi is also being seen today for multiple medical problems    Vocal Cord Paralysis: s/p underwent R vocal cord surgery x4 (2 injections to R, then bilateral implants) to help stimulate this vocal cord. Patient has had good result w/ improved speech. Happy w/ current quality of life. Has been released by ENT but will f/u annually     HTN: 116/74 today. Maintained on Lotrel 5/10 daily. No LH/dizziness, CP, SOB (exertion), leg swelling. She does report some palpitations most days. Reports some leg swelling     Meniere's disease: well controlled. No episodes in several years. Watches sodium closely.     Anxiety/depression: No concerns at this time, well controlled. Previously exacerbated by work and oldest son. Maintained on escitalopram 10. She uses trazodone 50mg nightly and 1mg Ativan nightly for sleep. Might taken another 0.5-1 Ativan during the day PRN     GERD: maintained on omeprazole 40 daily. No heartburn/reflux or dysphagia on medicine.     Allergic rhinitis: maintained on weekly allergy shots, Xyzal, Flonase, and Singulair. She continues to have some difficulty but reports these medications help a lot. Follows w/ ENT- Shots     OA: patient reports chronic arthritis of hand bilaterally as well as knees. Maintained on meloxicam 15 daily, which seems to help. Reasonably controlled at this time     Night  "sweats: thermostat set at 63 degrees and still wakes up soaking wet. Has to change pajamas. Occurs a multiple nights per week since [place]. No weight loss.          Objective   Vital Signs:  /74   Pulse 71   Resp 18   Ht 166.4 cm (65.5\")   Wt 101 kg (221 lb 12.8 oz)   SpO2 98%   BMI 36.35 kg/m²     Physical Exam  Constitutional:       General: She is not in acute distress.     Appearance: She is well-developed. She is obese.   HENT:      Head: Normocephalic and atraumatic.      Right Ear: External ear normal. There is no impacted cerumen.      Left Ear: Tympanic membrane and external ear normal. There is no impacted cerumen.      Nose: Nose normal.      Mouth/Throat:      Mouth: Mucous membranes are moist.      Pharynx: No oropharyngeal exudate or posterior oropharyngeal erythema.   Eyes:      General: No scleral icterus.        Right eye: No discharge.         Left eye: No discharge.      Extraocular Movements: Extraocular movements intact.      Conjunctiva/sclera: Conjunctivae normal.      Pupils: Pupils are equal, round, and reactive to light.   Neck:      Thyroid: No thyromegaly.      Vascular: No carotid bruit.   Cardiovascular:      Rate and Rhythm: Normal rate and regular rhythm.      Heart sounds: Normal heart sounds. No murmur heard.  Pulmonary:      Effort: Pulmonary effort is normal. No respiratory distress.      Breath sounds: Normal breath sounds. No wheezing or rales.   Abdominal:      General: Bowel sounds are normal. There is no distension.      Palpations: Abdomen is soft.      Tenderness: There is no abdominal tenderness.   Musculoskeletal:         General: Swelling (trace) present. No deformity. Normal range of motion.      Cervical back: Normal range of motion and neck supple.   Lymphadenopathy:      Cervical: No cervical adenopathy.   Skin:     General: Skin is warm and dry.      Findings: No rash.   Neurological:      General: No focal deficit present.      Mental Status: She is " alert and oriented to person, place, and time. Mental status is at baseline.      Cranial Nerves: No cranial nerve deficit.      Sensory: No sensory deficit.   Psychiatric:         Behavior: Behavior normal.         Thought Content: Thought content normal.             Assessment and Plan   Diagnoses and all orders for this visit:    1. Medicare annual wellness visit, subsequent (Primary)  -     Mammo Screening Digital Tomosynthesis Bilateral With CAD; Future  -     CBC & Differential  -     Comprehensive Metabolic Panel  -     Hemoglobin A1c  -     Lipid Panel  -     TSH  -     T4, Free  -     Vitamin D,25-Hydroxy  -     Vitamin B12  -  Counseled regarding diet, exercise, weight loss, and preventative health maintenance items/immunizations below    2. Bilateral partial vocal cord paralysis: stable   - Monitor    3. Primary hypertension: 116/74 today. Complains of leg swelling and palpitations  -     Start benazepril-hydrochlorthiazide (LOTENSIN HCT) 10-12.5 MG per tablet; Take 1 tablet by mouth Daily.  Dispense: 90 tablet; Refill: 3  - D/C Lotrel 5-10mg daily    4. Meniere's disease, unspecified laterality: no recent issues   - Cont low salt diet    5. Anxiety and depression: well controlled   - Cont home Lexapro 10mg daily, trazodone 50mg nightly and Ativan 1mg nightly    6. Gastroesophageal reflux disease, unspecified whether esophagitis present   - Cont home omeprazole 40mg daily    7. Allergic rhinitis, unspecified seasonality, unspecified trigger   - Cont allergy shots per ENT   - Cont home Xyzal daily, Flonase daily, and Singulair nightly   - Cont ENT f/u- Shots    8. Osteoarthritis, unspecified osteoarthritis type, unspecified site  -     Comprehensive Metabolic Panel  - Cont home meloxicam 15mg daily    9. Night sweats: new complaint. Unclear etiology. Labs as below. Consider adding LDH. No weight loss or fever  -     CBC & Differential  -     Peripheral Blood Smear    10. Palpitations: new complaint. Will  further evaluated w/ Holter  -     Holter Monitor - 72 Hour Up To 15 Days; Future    11. Leg swelling: amlodipine and chronic venous stasis likely playing a role  -     benazepril-hydrochlorthiazide (LOTENSIN HCT) 10-12.5 MG per tablet; Take 1 tablet by mouth Daily.  Dispense: 90 tablet; Refill: 3  - D/C Lotrel 5-10mg daily    12. Encounter for screening mammogram for malignant neoplasm of breast  -     Mammo Screening Digital Tomosynthesis Bilateral With CAD; Future    13. Vitamin D deficiency, unspecified  -     Vitamin D,25-Hydroxy         Follow Up   Return in about 12 weeks (around 7/15/2024) for Recheck- 30min.  Patient was given instructions and counseling regarding her condition or for health maintenance advice. Please see specific information pulled into the AVS if appropriate.

## 2024-04-23 ENCOUNTER — TELEPHONE (OUTPATIENT)
Dept: FAMILY MEDICINE CLINIC | Facility: CLINIC | Age: 74
End: 2024-04-23
Payer: COMMERCIAL

## 2024-04-23 DIAGNOSIS — R61 NIGHT SWEATS: Primary | ICD-10-CM

## 2024-04-23 LAB
LAB AP CASE REPORT: NORMAL
LDH SERPL-CCNC: 214 U/L (ref 135–214)
PATH REPORT.FINAL DX SPEC: NORMAL

## 2024-04-23 NOTE — TELEPHONE ENCOUNTER
Please Relay:  Left Kourtney Siddiqi a detailed message with results, per verbal release.     Labs look great aside from elevated blood sugar. This is in the prediabetic range. No medication is needed but recommend working to reduce sweets and increase exercise to prevent progression to diabetes. No other changes based on these results

## 2024-05-13 ENCOUNTER — HOSPITAL ENCOUNTER (OUTPATIENT)
Dept: RESPIRATORY THERAPY | Facility: HOSPITAL | Age: 74
Discharge: HOME | End: 2024-05-13
Admitting: FAMILY MEDICINE
Payer: MEDICARE

## 2024-05-13 DIAGNOSIS — R00.2 PALPITATIONS: ICD-10-CM

## 2024-05-13 PROCEDURE — 93246 EXT ECG>7D<15D RECORDING: CPT

## 2024-05-20 DIAGNOSIS — M79.671 FOOT PAIN, RIGHT: ICD-10-CM

## 2024-05-20 RX ORDER — MELOXICAM 15 MG/1
15 TABLET ORAL DAILY
Qty: 90 TABLET | Refills: 1 | Status: SHIPPED | OUTPATIENT
Start: 2024-05-20

## 2024-05-20 RX ORDER — OMEPRAZOLE 40 MG/1
40 CAPSULE, DELAYED RELEASE ORAL DAILY
Qty: 90 CAPSULE | Refills: 1 | Status: SHIPPED | OUTPATIENT
Start: 2024-05-20

## 2024-05-21 RX ORDER — MONTELUKAST SODIUM 10 MG/1
10 TABLET ORAL NIGHTLY
Qty: 90 TABLET | Refills: 1 | Status: SHIPPED | OUTPATIENT
Start: 2024-05-21

## 2024-06-04 ENCOUNTER — TELEPHONE (OUTPATIENT)
Dept: FAMILY MEDICINE CLINIC | Facility: CLINIC | Age: 74
End: 2024-06-04

## 2024-06-04 RX ORDER — METOPROLOL SUCCINATE 25 MG/1
25 TABLET, EXTENDED RELEASE ORAL DAILY
Qty: 90 TABLET | Refills: 1 | Status: SHIPPED | OUTPATIENT
Start: 2024-06-04

## 2024-06-04 NOTE — TELEPHONE ENCOUNTER
Caller: Kourtney Siddiqi    Relationship: Self    Best call back number: [phone]     What test was performed: HOLTER MONITOR     Where was the test performed: WAS MAILED IN

## 2024-07-22 ENCOUNTER — LAB (OUTPATIENT)
Dept: FAMILY MEDICINE CLINIC | Facility: CLINIC | Age: 74
End: 2024-07-22
Payer: MEDICARE

## 2024-07-22 ENCOUNTER — OFFICE VISIT (OUTPATIENT)
Dept: FAMILY MEDICINE CLINIC | Facility: CLINIC | Age: 74
End: 2024-07-22
Payer: MEDICARE

## 2024-07-22 VITALS
BODY MASS INDEX: 35.58 KG/M2 | WEIGHT: 221.4 LBS | SYSTOLIC BLOOD PRESSURE: 122 MMHG | OXYGEN SATURATION: 97 % | DIASTOLIC BLOOD PRESSURE: 68 MMHG | HEIGHT: 66 IN | HEART RATE: 56 BPM | RESPIRATION RATE: 18 BRPM

## 2024-07-22 DIAGNOSIS — G47.10 HYPERSOMNIA, UNSPECIFIED: ICD-10-CM

## 2024-07-22 DIAGNOSIS — R00.2 PALPITATIONS: ICD-10-CM

## 2024-07-22 DIAGNOSIS — R53.83 FATIGUE, UNSPECIFIED TYPE: ICD-10-CM

## 2024-07-22 DIAGNOSIS — I10 PRIMARY HYPERTENSION: Primary | ICD-10-CM

## 2024-07-22 LAB
ALBUMIN SERPL-MCNC: 4.2 G/DL (ref 3.5–5.2)
ALBUMIN/GLOB SERPL: 1.3 G/DL
ALP SERPL-CCNC: 84 U/L (ref 39–117)
ALT SERPL W P-5'-P-CCNC: 25 U/L (ref 1–33)
ANION GAP SERPL CALCULATED.3IONS-SCNC: 12.2 MMOL/L (ref 5–15)
AST SERPL-CCNC: 24 U/L (ref 1–32)
BASOPHILS # BLD AUTO: 0.05 10*3/MM3 (ref 0–0.2)
BASOPHILS NFR BLD AUTO: 0.6 % (ref 0–1.5)
BILIRUB SERPL-MCNC: 0.3 MG/DL (ref 0–1.2)
BUN SERPL-MCNC: 14 MG/DL (ref 8–23)
BUN/CREAT SERPL: 13.6 (ref 7–25)
CALCIUM SPEC-SCNC: 9.9 MG/DL (ref 8.6–10.5)
CHLORIDE SERPL-SCNC: 100 MMOL/L (ref 98–107)
CK SERPL-CCNC: 70 U/L (ref 20–180)
CO2 SERPL-SCNC: 27.8 MMOL/L (ref 22–29)
CREAT SERPL-MCNC: 1.03 MG/DL (ref 0.57–1)
CRP SERPL-MCNC: 0.62 MG/DL (ref 0–0.5)
DEPRECATED RDW RBC AUTO: 43.8 FL (ref 37–54)
EGFRCR SERPLBLD CKD-EPI 2021: 57.9 ML/MIN/1.73
EOSINOPHIL # BLD AUTO: 0.39 10*3/MM3 (ref 0–0.4)
EOSINOPHIL NFR BLD AUTO: 4.4 % (ref 0.3–6.2)
ERYTHROCYTE [DISTWIDTH] IN BLOOD BY AUTOMATED COUNT: 12.8 % (ref 12.3–15.4)
ERYTHROCYTE [SEDIMENTATION RATE] IN BLOOD: 6 MM/HR (ref 0–30)
GLOBULIN UR ELPH-MCNC: 3.3 GM/DL
GLUCOSE SERPL-MCNC: 107 MG/DL (ref 65–99)
HCT VFR BLD AUTO: 43.5 % (ref 34–46.6)
HGB BLD-MCNC: 14.2 G/DL (ref 12–15.9)
IMM GRANULOCYTES # BLD AUTO: 0.03 10*3/MM3 (ref 0–0.05)
IMM GRANULOCYTES NFR BLD AUTO: 0.3 % (ref 0–0.5)
LDH SERPL-CCNC: 210 U/L (ref 135–214)
LYMPHOCYTES # BLD AUTO: 2.02 10*3/MM3 (ref 0.7–3.1)
LYMPHOCYTES NFR BLD AUTO: 22.8 % (ref 19.6–45.3)
MCH RBC QN AUTO: 30.5 PG (ref 26.6–33)
MCHC RBC AUTO-ENTMCNC: 32.6 G/DL (ref 31.5–35.7)
MCV RBC AUTO: 93.5 FL (ref 79–97)
MONOCYTES # BLD AUTO: 0.7 10*3/MM3 (ref 0.1–0.9)
MONOCYTES NFR BLD AUTO: 7.9 % (ref 5–12)
NEUTROPHILS NFR BLD AUTO: 5.67 10*3/MM3 (ref 1.7–7)
NEUTROPHILS NFR BLD AUTO: 64 % (ref 42.7–76)
NRBC BLD AUTO-RTO: 0 /100 WBC (ref 0–0.2)
PLATELET # BLD AUTO: 345 10*3/MM3 (ref 140–450)
PMV BLD AUTO: 8.9 FL (ref 6–12)
POTASSIUM SERPL-SCNC: 4.8 MMOL/L (ref 3.5–5.2)
PROT SERPL-MCNC: 7.5 G/DL (ref 6–8.5)
RBC # BLD AUTO: 4.65 10*6/MM3 (ref 3.77–5.28)
SODIUM SERPL-SCNC: 140 MMOL/L (ref 136–145)
WBC NRBC COR # BLD AUTO: 8.86 10*3/MM3 (ref 3.4–10.8)

## 2024-07-22 PROCEDURE — 36415 COLL VENOUS BLD VENIPUNCTURE: CPT | Performed by: FAMILY MEDICINE

## 2024-07-22 PROCEDURE — 1160F RVW MEDS BY RX/DR IN RCRD: CPT | Performed by: FAMILY MEDICINE

## 2024-07-22 PROCEDURE — 83615 LACTATE (LD) (LDH) ENZYME: CPT | Performed by: FAMILY MEDICINE

## 2024-07-22 PROCEDURE — 86255 FLUORESCENT ANTIBODY SCREEN: CPT | Performed by: FAMILY MEDICINE

## 2024-07-22 PROCEDURE — 3078F DIAST BP <80 MM HG: CPT | Performed by: FAMILY MEDICINE

## 2024-07-22 PROCEDURE — 86042 ACETYLCHOLN RCPTR BLCKG ANTB: CPT | Performed by: FAMILY MEDICINE

## 2024-07-22 PROCEDURE — 85652 RBC SED RATE AUTOMATED: CPT | Performed by: FAMILY MEDICINE

## 2024-07-22 PROCEDURE — 86140 C-REACTIVE PROTEIN: CPT | Performed by: FAMILY MEDICINE

## 2024-07-22 PROCEDURE — 1159F MED LIST DOCD IN RCRD: CPT | Performed by: FAMILY MEDICINE

## 2024-07-22 PROCEDURE — 3074F SYST BP LT 130 MM HG: CPT | Performed by: FAMILY MEDICINE

## 2024-07-22 PROCEDURE — 86043 ACETYLCHOLN RCPTR MODLG ANTB: CPT | Performed by: FAMILY MEDICINE

## 2024-07-22 PROCEDURE — 80053 COMPREHEN METABOLIC PANEL: CPT | Performed by: FAMILY MEDICINE

## 2024-07-22 PROCEDURE — 99214 OFFICE O/P EST MOD 30 MIN: CPT | Performed by: FAMILY MEDICINE

## 2024-07-22 PROCEDURE — 82550 ASSAY OF CK (CPK): CPT | Performed by: FAMILY MEDICINE

## 2024-07-22 PROCEDURE — 86041 ACETYLCHOLN RCPTR BNDNG ANTB: CPT | Performed by: FAMILY MEDICINE

## 2024-07-22 PROCEDURE — 85025 COMPLETE CBC W/AUTO DIFF WBC: CPT | Performed by: FAMILY MEDICINE

## 2024-07-22 RX ORDER — METOPROLOL SUCCINATE 25 MG/1
12.5 TABLET, EXTENDED RELEASE ORAL DAILY
Qty: 90 TABLET | Refills: 1 | Status: SHIPPED | OUTPATIENT
Start: 2024-07-22

## 2024-07-22 NOTE — PROGRESS NOTES
Chief Complaint   Patient presents with    Hypertension    Anxiety    Depression     HPI  Kourtney Siddiqi is a 74 y.o. female that presents for   Chief Complaint   Patient presents with    Hypertension    Anxiety    Depression     HTN: 122/68 today. Lotrel was discontinued at last visit due to leg swelling. She was started on benazepril-HCTZ 10/12.5mg daily. This has done well and leg swelling is much improved. Reports some LH/dizziness, SOB as below. No CP    Palpitations: new complaint at visit 3 months ago. Holter monitor revealed premature beats. She was subsequently started on metoprolol XL 25mg daily. This has made considerable improvement in palpitations. Still gets a few on rare occasion    Fatigue: ongoing for the last year or so but progressive. Fatigue is always present but flares intermittently. No clear exacerbating factors. She reports associated symptoms of LH/dizziness. Patient reports some SOB but attributes this to nasal congestion. 4/2024 annual labs unremarkable. Patient reports 7-8hrs sleep/night. Has been told that she snores. Maybe a single nighttime awakening. 2/2022 LHC w/ clean coronaries. Patient is maintained on metoprolol but fatigue predates the addition of metoprolol    Review of Systems  Pertinent positives of ROS documented in HPI    The following portions of the patient's history were reviewed and updated as appropriate: problem list, past medical history, past surgical history, allergies, current medications, past social history and past family history.    Problem List Tab  Patient History Tab  Immunizations Tab  Medications Tab  Chart Review Tab  Care Everywhere Tab  Synopsis Tab    PE  Vitals:    07/22/24 1106   BP: 122/68   Pulse: 56   Resp: 18   SpO2: 97%     Body mass index is 36.28 kg/m².  General: Obese, NAD  Head: AT/NC  Eyes: EOMI, anicteric sclera  Resp: CTAB, SCR, BS equal  CV: RRR w/o m/r/g; 2+ pulses  GI: Soft, NT/ND, +BS  MSK: FROM, no deformity, no edema  Skin: Warm,  dry, intact  Neuro: Alert and oriented. No focal deficits. 5/5 strength  Psych: Appropriate mood and affect    Imaging  No Images in the past 120 days found..    Assessment & Plan   Kourtney Siddiqi is a 74 y.o. female that presents for   Chief Complaint   Patient presents with    Hypertension    Anxiety    Depression     Diagnoses and all orders for this visit:    1. Primary hypertension (Primary): 122/68 today   - Cont home benazepril-HCTZ 10/12.5mg daily    2. Palpitations: improved. HR 56 today   - Reduce metoprolol XL to 12.5mg daily   - Consider CARDS if concerns for tachy/lenin syndrome arise    3. Fatigue, unspecified type: unclear etiology. Patient reports feeling fatigued, tired and states limbs feel extremely heavy. 4/2024 labs reviewed w/ normal hgb, TSH, B12. Further lab evaluation as below. Will r/o ERNESTINA  -     Myasthenia Gravis Profile  -     C-reactive Protein  -     Sedimentation Rate  -     CK  -     CBC & Differential  -     Comprehensive Metabolic Panel  -     Lactate Dehydrogenase  -     Home Sleep Study; Future  - Reduce metoprolol XL to 12.5mg daily  - Consider CARDS for tachybrady syndrome    4. Hypersomnia, unspecified  -     Home Sleep Study; Future    Other orders  -     metoprolol succinate XL (Toprol XL) 25 MG 24 hr tablet; Take 0.5 tablets by mouth Daily.  Dispense: 90 tablet; Refill: 1       Return in about 3 months (around 10/22/2024) for Recheck- 30min.

## 2024-08-05 ENCOUNTER — TELEPHONE (OUTPATIENT)
Dept: FAMILY MEDICINE CLINIC | Facility: CLINIC | Age: 74
End: 2024-08-05

## 2024-08-05 ENCOUNTER — OFFICE VISIT (OUTPATIENT)
Dept: FAMILY MEDICINE CLINIC | Facility: CLINIC | Age: 74
End: 2024-08-05
Payer: MEDICARE

## 2024-08-05 ENCOUNTER — TELEPHONE (OUTPATIENT)
Dept: FAMILY MEDICINE CLINIC | Facility: CLINIC | Age: 74
End: 2024-08-05
Payer: MEDICARE

## 2024-08-05 VITALS
WEIGHT: 222.8 LBS | RESPIRATION RATE: 18 BRPM | BODY MASS INDEX: 35.81 KG/M2 | SYSTOLIC BLOOD PRESSURE: 122 MMHG | OXYGEN SATURATION: 97 % | HEART RATE: 68 BPM | DIASTOLIC BLOOD PRESSURE: 80 MMHG | HEIGHT: 66 IN

## 2024-08-05 DIAGNOSIS — N30.00 ACUTE CYSTITIS WITHOUT HEMATURIA: Primary | ICD-10-CM

## 2024-08-05 LAB
ACHR BIND AB SER-SCNC: <0.03 NMOL/L (ref 0–0.24)
ACHR BLOCK AB SER-ACNC: 21 % (ref 0–25)
ACHR MOD AB SER QL FC: 0 % (ref 0–45)
BILIRUB BLD-MCNC: NEGATIVE MG/DL
CLARITY, POC: ABNORMAL
COLOR UR: YELLOW
EXPIRATION DATE: ABNORMAL
GLUCOSE UR STRIP-MCNC: NEGATIVE MG/DL
KETONES UR QL: NEGATIVE
LEUKOCYTE EST, POC: ABNORMAL
Lab: ABNORMAL
MUSK AB SER IA-ACNC: <1 U/ML
NITRITE UR-MCNC: NEGATIVE MG/ML
PH UR: 6 [PH] (ref 5–8)
PROT UR STRIP-MCNC: NEGATIVE MG/DL
RBC # UR STRIP: ABNORMAL /UL
REFLEX INFORMATION: NORMAL
SP GR UR: 1 (ref 1–1.03)
STRIA MUS AB TITR SER IF: NEGATIVE {TITER}
UROBILINOGEN UR QL: ABNORMAL

## 2024-08-05 PROCEDURE — 3074F SYST BP LT 130 MM HG: CPT | Performed by: NURSE PRACTITIONER

## 2024-08-05 PROCEDURE — G2211 COMPLEX E/M VISIT ADD ON: HCPCS | Performed by: NURSE PRACTITIONER

## 2024-08-05 PROCEDURE — 81003 URINALYSIS AUTO W/O SCOPE: CPT | Performed by: NURSE PRACTITIONER

## 2024-08-05 PROCEDURE — 99213 OFFICE O/P EST LOW 20 MIN: CPT | Performed by: NURSE PRACTITIONER

## 2024-08-05 PROCEDURE — 87086 URINE CULTURE/COLONY COUNT: CPT | Performed by: NURSE PRACTITIONER

## 2024-08-05 PROCEDURE — 3079F DIAST BP 80-89 MM HG: CPT | Performed by: NURSE PRACTITIONER

## 2024-08-05 PROCEDURE — 87088 URINE BACTERIA CULTURE: CPT | Performed by: NURSE PRACTITIONER

## 2024-08-05 PROCEDURE — 1160F RVW MEDS BY RX/DR IN RCRD: CPT | Performed by: NURSE PRACTITIONER

## 2024-08-05 PROCEDURE — 87186 SC STD MICRODIL/AGAR DIL: CPT | Performed by: NURSE PRACTITIONER

## 2024-08-05 PROCEDURE — 1159F MED LIST DOCD IN RCRD: CPT | Performed by: NURSE PRACTITIONER

## 2024-08-05 RX ORDER — SULFAMETHOXAZOLE AND TRIMETHOPRIM 800; 160 MG/1; MG/1
1 TABLET ORAL 2 TIMES DAILY
Qty: 10 TABLET | Refills: 0 | Status: SHIPPED | OUTPATIENT
Start: 2024-08-05

## 2024-08-05 NOTE — PROGRESS NOTES
"Chief Complaint  Urinary Frequency (Burning while urinating ) and Urinary Tract Infection (Possible uti )  Subjective        Kourtney Siddiqi presents to [facility]  History of Present Illness  Pt comes in today with c/o dysuria and frequency.  Symptoms started on Saturday.   No hematuria  No fever or chills.  Some nausea. No vomiting or diarrhea.   Had a UTI around 6/28 and was treated with macrobid.   Urinary Frequency   Associated symptoms include frequency.   Urinary Tract Infection   Associated symptoms include frequency.     Review of Systems   Genitourinary:  Positive for frequency.     Objective     Vital Signs:   /80   Pulse 68   Resp 18   Ht 166.4 cm (65.51\")   Wt 101 kg (222 lb 12.8 oz)   SpO2 97%   BMI 36.50 kg/m²       BP Readings from Last 3 Encounters:   08/05/24 122/80   07/22/24 122/68   04/22/24 116/74       Wt Readings from Last 3 Encounters:   08/05/24 101 kg (222 lb 12.8 oz)   07/22/24 100 kg (221 lb 6.4 oz)   04/22/24 101 kg (221 lb 12.8 oz)     Physical Exam  Constitutional:       Appearance: She is well-developed.   Eyes:      Pupils: Pupils are equal, round, and reactive to light.   Cardiovascular:      Rate and Rhythm: Normal rate and regular rhythm.   Pulmonary:      Effort: Pulmonary effort is normal.      Breath sounds: Normal breath sounds.   Neurological:      Mental Status: She is alert and oriented to person, place, and time.        Result Review :                 Assessment and Plan    Diagnoses and all orders for this visit:    1. Acute cystitis without hematuria (Primary)  -     POCT urinalysis dipstick, automated  -     Urine Culture - Urine, Urine, Clean Catch; Future  -     sulfamethoxazole-trimethoprim (Bactrim DS) 800-160 MG per tablet; Take 1 tablet by mouth 2 (Two) Times a Day.  Dispense: 10 tablet; Refill: 0    UA with moderate blood and leukocytes  Culture urine  Start anbx  Push fluids  During this office visit, we discussed " the pertinent aspects of the visit and treatment recommendations. Pt verbalizes understanding. Follow up was discussed. Patient was given the opportunity to ask questions and discuss other concerns.         Follow Up   Return if symptoms worsen or fail to improve.  Patient was given instructions and counseling regarding her condition or for health maintenance advice. Please see specific information pulled into the AVS if appropriate.

## 2024-08-05 NOTE — TELEPHONE ENCOUNTER
Please Relay:  Left Kourtney Siddiqi a detailed message with results, per verbal release.     Labs have returned negative for Myasthenia gravis. Please call to see how patient is doing regarding weakness/fatigue

## 2024-08-05 NOTE — TELEPHONE ENCOUNTER
Caller: Kourtney Siddiqi    Relationship: Self    Best call back number:     [phone]        Which medication are you concerned about: sulfamethoxazole-trimethoprim (Bactrim DS) 800-160 MG per tablet [[id number]] (Order [id number])     Who prescribed you this medication:   TREV    When did you start taking this medication: NA    What are your concerns:   [facility] DID NOT RECEIVE THE PRESCRIPTION      [id number] - [place] [address] [address] AT [facility] OF  [address]  [address] - [phone] [facility] [phone] FX

## 2024-08-08 LAB — BACTERIA SPEC AEROBE CULT: ABNORMAL

## 2024-08-15 DIAGNOSIS — F41.9 ANXIETY: ICD-10-CM

## 2024-08-16 RX ORDER — LORAZEPAM 1 MG/1
1 TABLET ORAL 2 TIMES DAILY PRN
Qty: 180 TABLET | Refills: 1 | Status: SHIPPED | OUTPATIENT
Start: 2024-08-16

## 2024-08-16 RX ORDER — ESCITALOPRAM OXALATE 10 MG/1
TABLET ORAL
Qty: 90 TABLET | Refills: 1 | Status: SHIPPED | OUTPATIENT
Start: 2024-08-16

## 2024-08-21 NOTE — PROGRESS NOTES
Chief Complaint  Diverticulitis    Subjective          Kourtney Siddiqi presents to [facility]  History of Present Illness    Is here today with c/o abdominal pain which started on Sunday which is located in the left lower quadrant and is consistent with a diverticulitis flare up  She was constipated but her bowels have not moved in the past 2 days at this time  She tells me that she has always needed an antibiotic to recover from a diverticulitis flare up    She has had a fever off and on, none in the past 2 days    She tells me that yesterday afternoon the pain is less intense    Has been on macrobid since Friday for a UTI, the prescription was for 10 days    Review of Systems   Constitutional: Positive for fever. Negative for appetite change and fatigue.   Gastrointestinal: Positive for abdominal pain and nausea.        Llq abd pain, has noticed some mucus in the stool, stools are hard, denies any blood in the stools       Objective   Vital Signs:  /76 (BP Location: Left arm, Patient Position: Sitting)   Pulse 82   Resp 18   Wt 100 kg (221 lb)   SpO2 97%   BMI 37.93 kg/m²     BP Readings from Last 3 Encounters:   01/12/23 120/76   11/07/22 134/80   04/11/22 106/68        Wt Readings from Last 3 Encounters:   01/12/23 100 kg (221 lb)   11/07/22 99.8 kg (220 lb)   04/11/22 96.2 kg (212 lb)              Physical Exam  Vitals reviewed.   Constitutional:       Appearance: Normal appearance.   Cardiovascular:      Rate and Rhythm: Normal rate and regular rhythm.      Pulses: Normal pulses.      Heart sounds: Normal heart sounds.   Pulmonary:      Effort: Pulmonary effort is normal.      Breath sounds: Normal breath sounds.   Abdominal:      Palpations: Abdomen is soft.      Tenderness: There is abdominal tenderness in the left lower quadrant. There is no right CVA tenderness, left CVA tenderness, guarding or rebound.       Neurological:      Mental Status: She is alert.         Result Review :                 Assessment and Plan    Diagnoses and all orders for this visit:    1. Left lower quadrant abdominal pain (Primary)  -     CBC No Differential; Future  -     Comprehensive metabolic panel; Future  -     ciprofloxacin (Cipro) 500 MG tablet; Take 1 tablet by mouth 2 (Two) Times a Day.  Dispense: 20 tablet; Refill: 0  -     metroNIDAZOLE (Flagyl) 500 MG tablet; Take 1 tablet by mouth 3 (Three) Times a Day.  Dispense: 30 tablet; Refill: 0    2. History of colonic diverticulitis  -     CBC No Differential; Future  -     Comprehensive metabolic panel; Future    advised to discontinue the macrobid as she has taken it for more than 5 days  Continue probiotic, add stool softener, cipro & flagyl  To er if any worsening of symptoms, verbalizes understanding       Follow Up   Return if symptoms worsen or fail to improve.  Patient was given instructions and counseling regarding her condition or for health maintenance advice. Please see specific information pulled into the AVS if appropriate.        Yes - the patient is able to be screened

## 2024-10-24 ENCOUNTER — HOSPITAL ENCOUNTER (OUTPATIENT)
Dept: MAMMOGRAPHY | Facility: HOSPITAL | Age: 74
Discharge: HOME | End: 2024-10-24
Admitting: FAMILY MEDICINE
Payer: MEDICARE

## 2024-10-24 ENCOUNTER — OFFICE VISIT (OUTPATIENT)
Dept: FAMILY MEDICINE CLINIC | Facility: CLINIC | Age: 74
End: 2024-10-24
Payer: MEDICARE

## 2024-10-24 VITALS
HEIGHT: 66 IN | DIASTOLIC BLOOD PRESSURE: 88 MMHG | BODY MASS INDEX: 35.1 KG/M2 | OXYGEN SATURATION: 99 % | WEIGHT: 218.4 LBS | HEART RATE: 67 BPM | SYSTOLIC BLOOD PRESSURE: 128 MMHG

## 2024-10-24 DIAGNOSIS — R00.2 PALPITATIONS: Primary | ICD-10-CM

## 2024-10-24 DIAGNOSIS — Z23 NEED FOR VACCINATION: ICD-10-CM

## 2024-10-24 DIAGNOSIS — Z00.00 MEDICARE ANNUAL WELLNESS VISIT, SUBSEQUENT: ICD-10-CM

## 2024-10-24 DIAGNOSIS — R53.83 FATIGUE, UNSPECIFIED TYPE: ICD-10-CM

## 2024-10-24 DIAGNOSIS — Z12.31 ENCOUNTER FOR SCREENING MAMMOGRAM FOR MALIGNANT NEOPLASM OF BREAST: ICD-10-CM

## 2024-10-24 DIAGNOSIS — M54.2 NECK PAIN: ICD-10-CM

## 2024-10-24 PROCEDURE — 77063 BREAST TOMOSYNTHESIS BI: CPT

## 2024-10-24 PROCEDURE — 1159F MED LIST DOCD IN RCRD: CPT | Performed by: FAMILY MEDICINE

## 2024-10-24 PROCEDURE — G0008 ADMIN INFLUENZA VIRUS VAC: HCPCS | Performed by: FAMILY MEDICINE

## 2024-10-24 PROCEDURE — 77067 SCR MAMMO BI INCL CAD: CPT

## 2024-10-24 PROCEDURE — 3074F SYST BP LT 130 MM HG: CPT | Performed by: FAMILY MEDICINE

## 2024-10-24 PROCEDURE — 1160F RVW MEDS BY RX/DR IN RCRD: CPT | Performed by: FAMILY MEDICINE

## 2024-10-24 PROCEDURE — 90662 IIV NO PRSV INCREASED AG IM: CPT | Performed by: FAMILY MEDICINE

## 2024-10-24 PROCEDURE — 3079F DIAST BP 80-89 MM HG: CPT | Performed by: FAMILY MEDICINE

## 2024-10-24 PROCEDURE — 99214 OFFICE O/P EST MOD 30 MIN: CPT | Performed by: FAMILY MEDICINE

## 2024-10-24 RX ORDER — PREDNISONE 20 MG/1
TABLET ORAL
Qty: 15 TABLET | Refills: 0 | Status: SHIPPED | OUTPATIENT
Start: 2024-10-24

## 2024-10-24 NOTE — PROGRESS NOTES
Chief Complaint   Patient presents with    Hyperlipidemia     3 month     Hypertension     3 month     Neck Pain     Left side, pulling and tender.      HPI  Kourtney Siddiqi is a 74 y.o. female that presents for   Chief Complaint   Patient presents with    Hyperlipidemia     3 month     Hypertension     3 month     Neck Pain     Left side, pulling and tender.      Fatigue: improved. Patient reports this has improved and now back to baseline since reducing her metoprolol to 12.5mg daily. HR up to 67 today.    Palpitations: patient reports that she has had a few more palpitations since reducing her metoprolol XL to 12.5mg daily. She has been having palpitations about 1x/week. This is generally controlled by taking an extra 12.5mg metoprolol. Ativan also seems to help.     Neck pain: patient reports L sided neck pain for the last 3 weeks. Worse w/ palpation and turning head. This area intermittently gets more swollen. This first started 13 years ago and was evaluated by CT that was not concerning.     Review of Systems  Pertinent positives of ROS documented in HPI    The following portions of the patient's history were reviewed and updated as appropriate: problem list, past medical history, past surgical history, allergies, current medication    Problem List Tab  Patient History Tab  Immunizations Tab  Medications Tab  Chart Review Tab  Care Everywhere Tab  Synopsis Tab    PE  Vitals:    10/24/24 1109   BP: 128/88   Pulse: 67   SpO2: 99%     Body mass index is 35.78 kg/m².  General: Obese, NAD  Head: AT/NC  Eyes: EOMI, anicteric sclera  Neck: L supraclavicular fullness and TTP. No discrete lymphadenopathy or lipoma  Resp: CTAB, SCR, BS equal  CV: RRR w/o m/r/g; 2+ pulses  GI: Soft, NT/ND, +BS  MSK: FROM, no deformity, no edema  Skin: Warm, dry, intact  Neuro: Alert and oriented. No focal deficits  Psych: Appropriate mood and affect    Imaging  No Images in the past 120 days found..    Assessment & Plan   Kourtney Siddiqi  is a 74 y.o. female that presents for   Chief Complaint   Patient presents with    Hyperlipidemia     3 month     Hypertension     3 month     Neck Pain     Left side, pulling and tender.      Diagnoses and all orders for this visit:    1. Palpitations (Primary)   - Cont home metoprolol XL 12.5mg daily w/ additional 12.5mg PRN    2. Fatigue, unspecified type: improved on lower dose of metoprolol   - Monitor    3. Neck pain: tenderness along L supraclavicular fossa and sternocleidomastoid. Neck strain most likely but patient reports intermittent neck swelling for years  -     Start predniSONE (DELTASONE) 20 MG tablet; Take 2 tablets daily for 5 days and then 1 tablet daily for 5 days  Dispense: 15 tablet; Refill: 0  - Plan for CT soft tissues neck if not improved on pred    4. Need for vaccination  -     Fluzone High-Dose 65+yrs     Return in about 6 months (around 4/24/2025) for Medicare Wellness.

## 2024-10-25 ENCOUNTER — TELEPHONE (OUTPATIENT)
Dept: FAMILY MEDICINE CLINIC | Facility: CLINIC | Age: 74
End: 2024-10-25
Payer: MEDICARE

## 2024-10-25 NOTE — TELEPHONE ENCOUNTER
Please Relay:  Left Kourtney SENA Siddiqi a detailed message with results, per verbal release.     Normal mammogram. Plan to repeat next year. No further work-up needed at this time

## 2024-11-19 ENCOUNTER — TELEPHONE (OUTPATIENT)
Dept: FAMILY MEDICINE CLINIC | Facility: CLINIC | Age: 74
End: 2024-11-19

## 2024-11-19 NOTE — TELEPHONE ENCOUNTER
Caller: Kourtney Siddiqi    Relationship: Self    Best call back number: [phone]     What orders are you requesting (i.e. lab or imaging): CAT SCAN OF RIGHT SIDE OF NECK    In what timeframe would the patient need to come in: WEEK OF 11-    Additional notes: PATIENT STATED SHE WAS PRESCRIBED STEROIDS BY DR CARNES, AND WAS ADVISED TO CALL WHEN SHE FINISHED THE STEROIDS, AS DR CARNES WOULD LIKE TO HAVE A CAT SCAN DONE.    PATIENT STATED SHE FINISHED THE STEROIDS ON 11-

## 2024-11-20 DIAGNOSIS — R22.1 NECK SWELLING: Primary | ICD-10-CM

## 2024-11-20 NOTE — TELEPHONE ENCOUNTER
Called patient and gave her this message. She states that somebody else from the office had already called.

## 2024-12-02 RX ORDER — ESCITALOPRAM OXALATE 10 MG/1
TABLET ORAL
Qty: 90 TABLET | Refills: 1 | Status: SHIPPED | OUTPATIENT
Start: 2024-12-02

## 2024-12-02 RX ORDER — METOPROLOL SUCCINATE 25 MG/1
25 TABLET, EXTENDED RELEASE ORAL DAILY
Qty: 90 TABLET | Refills: 1 | Status: SHIPPED | OUTPATIENT
Start: 2024-12-02

## 2024-12-02 RX ORDER — MONTELUKAST SODIUM 10 MG/1
10 TABLET ORAL NIGHTLY
Qty: 90 TABLET | Refills: 1 | Status: SHIPPED | OUTPATIENT
Start: 2024-12-02

## 2024-12-04 DIAGNOSIS — R22.1 NECK SWELLING: ICD-10-CM

## 2024-12-05 DIAGNOSIS — E04.1 THYROID NODULE: Primary | ICD-10-CM

## 2024-12-18 DIAGNOSIS — E04.1 THYROID NODULE: ICD-10-CM

## 2024-12-19 ENCOUNTER — TELEPHONE (OUTPATIENT)
Dept: FAMILY MEDICINE CLINIC | Facility: CLINIC | Age: 74
End: 2024-12-19
Payer: MEDICARE

## 2024-12-19 DIAGNOSIS — E04.1 THYROID NODULE: Primary | ICD-10-CM

## 2024-12-19 NOTE — TELEPHONE ENCOUNTER
Caller: Kourtney Siddiqi    Relationship: Self    Best call back number: [phone]    What is the best time to reach you: ANYTIME    Who are you requesting to speak with (clinical staff, provider,  specific staff member): CLINICAL      What was the call regarding: PATIENT IS CALLING IN TO RETURN A CALL FROM THE OFFICE      PLEASE CALLBACK

## 2024-12-19 NOTE — TELEPHONE ENCOUNTER
Tried calling pt with results/orders and had to LVM to call the office back.  12-19-24 0908    ----- Message from Bert Shultz sent at 12/19/2024  6:06 AM EST -----  Thyroid US reveals 2.0cm thyroid nodule in the L lower portion of the thyroid. Because of the size, it would qualify for biopsy. The hospital will call you to schedule this procedure

## 2024-12-19 NOTE — TELEPHONE ENCOUNTER
[facility] Brief Op Note    Patient: Arianna Whitaker 70 year old female    Pre-Op Diagnosis: Severe Back Pain, Left L5 Radiculopathy, L4-L5 Severe Lumbar Stenosis, L4-L5 Grade 1 Spondylolisthesis     Post-Op Diagnosis:Severe Back Pain, Left L5 Radiculopathy, L4-L5 Severe Lumbar Stenosis, L4-L5 Grade 1 Spondylolisthesis     Surgeon(s): Tutu Sosa MD  Phone Number: [phone]                       Surgeon(s) and Role:     * Tutu Sosa MD - Primary    Assistant:  Alexander Montoya CNP      Procedure: Lumbar Four-Lumbar Five Posterior Spinal Fusion, Lumbar Four-Lumbar Five Transforaminal Lumbar Interbody Fusion, Intraoperative use of O-arm, C-arm, SSEP/EMG, Stealth Navigation, Autograft, Allograft, Bone Morphogenetic Protein    Anesthesia Type: General                                 General     Specimens Removed: No specimens collected during this procedure.     Estimated Blood Loss: 50 ml     Complications: None; patient tolerated the procedure well.    Implants: CareSpotter Instrumentation    Implant Name Type Inv. Item Serial No.  Lot No. LRB No. Used Action   PASTE ROLANDA PLUS DBM 10CC - [id number] Filler PASTE ROLANDA PLUS DBM 10CC [id number] [employer] NA N/A 1 Implanted   CHIP CANC CRUSHED 1-4MM 30CC - [id number] Tissue CHIP CANC CRUSHED 1-4MM 30CC  LIF[employer]ET NA N/A 1 Implanted   SCREW SET SOLERA 5.5MM TI - [id number] Screw SCREW SET SOLERA 5.5MM TI  [employer] NA N/A 4 Implanted   SCREW SOLERA MAS 6.5X45MM - [id number] Screw SCREW SOLERA MAS 6.5X45MM  [employer] NA N/A 2 Implanted   SCREW SOLERA MAS 7.5X45MM - [id number] Screw SCREW SOLERA MAS 7.5X45MM  [employer] NA N/A 2 Implanted   KIT INFUSE BONE GRAFT SM - [id number] Graft KIT INFUSE BONE GRAFT SM  [employer]  N/A 1 Implanted   UZIEL SOLERA CRVD 35MM - [id number] Uziel UZIEL SOLERA CRVD 35MM  [employer]  N/A 2 Implanted   CAGE ARTIC-L TI 20D [id number] - [id number] Cage CAGE ARTIC-L TI 20D [id number]  [employer]  Called pt back and notified her of results/order and she v/u.    INC  N/A 1 Implanted     IVF:  1700 ml    UO: 500 ml    Drains: 7 mm SAHIL Drain, Chacko Catheter    Patient extubated in the OR.  Transported to PACU in stable condition.  As per State Law Public Act 100-0564, [place]  prior to prescribing a scheduled II narcotic.

## 2024-12-20 RX ORDER — OMEPRAZOLE 40 MG/1
40 CAPSULE, DELAYED RELEASE ORAL DAILY
Qty: 90 CAPSULE | Refills: 1 | Status: SHIPPED | OUTPATIENT
Start: 2024-12-20

## 2025-01-02 ENCOUNTER — HOSPITAL ENCOUNTER (OUTPATIENT)
Dept: ULTRASOUND IMAGING | Facility: HOSPITAL | Age: 75
Discharge: HOME | End: 2025-01-02
Admitting: FAMILY MEDICINE
Payer: MEDICARE

## 2025-01-02 DIAGNOSIS — E04.1 THYROID NODULE: ICD-10-CM

## 2025-01-02 PROCEDURE — 25010000002 LIDOCAINE 1 % SOLUTION: Performed by: FAMILY MEDICINE

## 2025-01-02 PROCEDURE — 88173 CYTOPATH EVAL FNA REPORT: CPT | Performed by: FAMILY MEDICINE

## 2025-01-02 PROCEDURE — 88305 TISSUE EXAM BY PATHOLOGIST: CPT | Performed by: FAMILY MEDICINE

## 2025-01-02 RX ORDER — LIDOCAINE HYDROCHLORIDE 10 MG/ML
5 INJECTION, SOLUTION INFILTRATION; PERINEURAL ONCE
Status: COMPLETED | OUTPATIENT
Start: 2025-01-02 | End: 2025-01-02

## 2025-01-02 RX ADMIN — Medication 5 ML: at 12:36

## 2025-01-03 LAB
CYTO UR: NORMAL
LAB AP CASE REPORT: NORMAL
PATH REPORT.FINAL DX SPEC: NORMAL
PATH REPORT.GROSS SPEC: NORMAL

## 2025-01-15 DIAGNOSIS — G47.00 INSOMNIA, UNSPECIFIED TYPE: ICD-10-CM

## 2025-01-15 DIAGNOSIS — F41.1 GENERALIZED ANXIETY DISORDER: ICD-10-CM

## 2025-01-15 RX ORDER — TRAZODONE HYDROCHLORIDE 100 MG/1
TABLET ORAL
Qty: 90 TABLET | Refills: 1 | Status: SHIPPED | OUTPATIENT
Start: 2025-01-15

## 2025-02-14 DIAGNOSIS — M79.89 LEG SWELLING: ICD-10-CM

## 2025-02-14 DIAGNOSIS — I10 PRIMARY HYPERTENSION: ICD-10-CM

## 2025-02-14 RX ORDER — BENAZEPRIL HYDROCHLORIDE AND HYDROCHLOROTHIAZIDE 10; 12.5 MG/1; MG/1
1 TABLET ORAL DAILY
Qty: 90 TABLET | Refills: 1 | Status: SHIPPED | OUTPATIENT
Start: 2025-02-14

## 2025-03-07 ENCOUNTER — TELEPHONE (OUTPATIENT)
Dept: FAMILY MEDICINE CLINIC | Facility: CLINIC | Age: 75
End: 2025-03-07

## 2025-03-07 NOTE — TELEPHONE ENCOUNTER
Caller: Kourtney Siddiqi    Relationship: Self    Best call back number:[phone]    What is the best time to reach you: ANYTIME    Who are you requesting to speak with (clinical staff, provider,  specific staff member): CLINICAL    What was the call regarding: PATIENT IS CALLING IN TO SEE IF SHE CAN GET A NOTE TO DISMISS HER FROM BEING ON JURY DUTY. SHE GOT A LETTER SAYING SHE WAS UP FOR JURY DUTY AND WAS WONDERING IF SHE COULD GET SOMETHING FROM DR. CARNES THAT SHE COULD TURN IN ABOUT HER ANXIETY AND NOT HAVING TO PARTICIPATE. JURY DUTY IS ON 3/17 [facility].     PLEASE SEND EMAIL OF LETTER TO [email or web address]     PLEASE CALL TO DISCUSS     Is it okay if the provider responds through MyChart: NO

## 2025-03-11 NOTE — TELEPHONE ENCOUNTER
Caller: Kourtney Siddiqi    Relationship to patient: Self    Best call back number:   Telephone Information:   Mobile [phone]         Patient is needing: PATIENT CALLED TO CHECK STATUS OF LETTER REQUEST. NOT HUB TO RELAY. I DID CHECK APPOINTMENT WITH DR. CARNES AND HIS AVAILABILITY IS AFTER PATIENT NEEDS FOR COURT SYSTEM. PLEASE CALL BACK

## 2025-04-01 DIAGNOSIS — M79.671 FOOT PAIN, RIGHT: ICD-10-CM

## 2025-04-01 RX ORDER — MELOXICAM 15 MG/1
15 TABLET ORAL DAILY
Qty: 90 TABLET | Refills: 1 | Status: SHIPPED | OUTPATIENT
Start: 2025-04-01

## 2025-04-14 DIAGNOSIS — F41.9 ANXIETY: ICD-10-CM

## 2025-04-14 RX ORDER — LORAZEPAM 1 MG/1
1 TABLET ORAL 2 TIMES DAILY PRN
Qty: 180 TABLET | Refills: 0 | Status: SHIPPED | OUTPATIENT
Start: 2025-04-14

## 2025-04-28 ENCOUNTER — OFFICE VISIT (OUTPATIENT)
Dept: FAMILY MEDICINE CLINIC | Facility: CLINIC | Age: 75
End: 2025-04-28
Payer: MEDICARE

## 2025-04-28 ENCOUNTER — LAB (OUTPATIENT)
Dept: FAMILY MEDICINE CLINIC | Facility: CLINIC | Age: 75
End: 2025-04-28
Payer: MEDICARE

## 2025-04-28 VITALS
OXYGEN SATURATION: 97 % | WEIGHT: 224 LBS | RESPIRATION RATE: 18 BRPM | HEIGHT: 66 IN | BODY MASS INDEX: 36 KG/M2 | DIASTOLIC BLOOD PRESSURE: 76 MMHG | HEART RATE: 69 BPM | SYSTOLIC BLOOD PRESSURE: 124 MMHG

## 2025-04-28 DIAGNOSIS — I10 PRIMARY HYPERTENSION: ICD-10-CM

## 2025-04-28 DIAGNOSIS — M85.88 OTHER SPECIFIED DISORDERS OF BONE DENSITY AND STRUCTURE, OTHER SITE: ICD-10-CM

## 2025-04-28 DIAGNOSIS — E55.9 VITAMIN D DEFICIENCY, UNSPECIFIED: ICD-10-CM

## 2025-04-28 DIAGNOSIS — Z12.31 ENCOUNTER FOR SCREENING MAMMOGRAM FOR MALIGNANT NEOPLASM OF BREAST: ICD-10-CM

## 2025-04-28 DIAGNOSIS — J30.9 ALLERGIC RHINITIS, UNSPECIFIED SEASONALITY, UNSPECIFIED TRIGGER: ICD-10-CM

## 2025-04-28 DIAGNOSIS — F32.A ANXIETY AND DEPRESSION: ICD-10-CM

## 2025-04-28 DIAGNOSIS — H81.09 MENIERE'S DISEASE, UNSPECIFIED LATERALITY: ICD-10-CM

## 2025-04-28 DIAGNOSIS — J38.02 BILATERAL PARTIAL VOCAL CORD PARALYSIS: ICD-10-CM

## 2025-04-28 DIAGNOSIS — R00.2 PALPITATIONS: ICD-10-CM

## 2025-04-28 DIAGNOSIS — K21.9 GASTROESOPHAGEAL REFLUX DISEASE, UNSPECIFIED WHETHER ESOPHAGITIS PRESENT: ICD-10-CM

## 2025-04-28 DIAGNOSIS — Z00.00 MEDICARE ANNUAL WELLNESS VISIT, SUBSEQUENT: Primary | ICD-10-CM

## 2025-04-28 DIAGNOSIS — M19.90 OSTEOARTHRITIS, UNSPECIFIED OSTEOARTHRITIS TYPE, UNSPECIFIED SITE: ICD-10-CM

## 2025-04-28 DIAGNOSIS — F41.9 ANXIETY AND DEPRESSION: ICD-10-CM

## 2025-04-28 PROCEDURE — 83036 HEMOGLOBIN GLYCOSYLATED A1C: CPT | Performed by: FAMILY MEDICINE

## 2025-04-28 PROCEDURE — 3078F DIAST BP <80 MM HG: CPT | Performed by: FAMILY MEDICINE

## 2025-04-28 PROCEDURE — 82607 VITAMIN B-12: CPT | Performed by: FAMILY MEDICINE

## 2025-04-28 PROCEDURE — 82306 VITAMIN D 25 HYDROXY: CPT | Performed by: FAMILY MEDICINE

## 2025-04-28 PROCEDURE — 1159F MED LIST DOCD IN RCRD: CPT | Performed by: FAMILY MEDICINE

## 2025-04-28 PROCEDURE — 36415 COLL VENOUS BLD VENIPUNCTURE: CPT | Performed by: FAMILY MEDICINE

## 2025-04-28 PROCEDURE — 99214 OFFICE O/P EST MOD 30 MIN: CPT | Performed by: FAMILY MEDICINE

## 2025-04-28 PROCEDURE — 85025 COMPLETE CBC W/AUTO DIFF WBC: CPT | Performed by: FAMILY MEDICINE

## 2025-04-28 PROCEDURE — G2211 COMPLEX E/M VISIT ADD ON: HCPCS | Performed by: FAMILY MEDICINE

## 2025-04-28 PROCEDURE — G0439 PPPS, SUBSEQ VISIT: HCPCS | Performed by: FAMILY MEDICINE

## 2025-04-28 PROCEDURE — 80053 COMPREHEN METABOLIC PANEL: CPT | Performed by: FAMILY MEDICINE

## 2025-04-28 PROCEDURE — 3074F SYST BP LT 130 MM HG: CPT | Performed by: FAMILY MEDICINE

## 2025-04-28 PROCEDURE — 80061 LIPID PANEL: CPT | Performed by: FAMILY MEDICINE

## 2025-04-28 PROCEDURE — 1160F RVW MEDS BY RX/DR IN RCRD: CPT | Performed by: FAMILY MEDICINE

## 2025-04-28 PROCEDURE — 1126F AMNT PAIN NOTED NONE PRSNT: CPT | Performed by: FAMILY MEDICINE

## 2025-04-28 PROCEDURE — 84443 ASSAY THYROID STIM HORMONE: CPT | Performed by: FAMILY MEDICINE

## 2025-04-28 PROCEDURE — 84439 ASSAY OF FREE THYROXINE: CPT | Performed by: FAMILY MEDICINE

## 2025-04-28 RX ORDER — METOPROLOL SUCCINATE 25 MG/1
25 TABLET, EXTENDED RELEASE ORAL DAILY
Qty: 90 TABLET | Refills: 3 | Status: SHIPPED | OUTPATIENT
Start: 2025-04-28

## 2025-04-28 RX ORDER — ESCITALOPRAM OXALATE 10 MG/1
10 TABLET ORAL DAILY
Qty: 90 TABLET | Refills: 3 | Status: SHIPPED | OUTPATIENT
Start: 2025-04-28

## 2025-04-28 RX ORDER — TRAZODONE HYDROCHLORIDE 50 MG/1
50 TABLET ORAL NIGHTLY
Qty: 90 TABLET | Refills: 3 | Status: SHIPPED | OUTPATIENT
Start: 2025-04-28

## 2025-04-28 RX ORDER — OMEPRAZOLE 40 MG/1
40 CAPSULE, DELAYED RELEASE ORAL DAILY
Qty: 90 CAPSULE | Refills: 3 | Status: SHIPPED | OUTPATIENT
Start: 2025-04-28

## 2025-04-28 RX ORDER — MONTELUKAST SODIUM 10 MG/1
10 TABLET ORAL NIGHTLY
Qty: 90 TABLET | Refills: 3 | Status: SHIPPED | OUTPATIENT
Start: 2025-04-28
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
## 2025-04-28 NOTE — ASSESSMENT & PLAN NOTE
Orders:    Cont traZODone (DESYREL) 50 MG tablet; Take 1 tablet by mouth Every Night.    Cont escitalopram (LEXAPRO) 10 MG tablet; Take 1 tablet by mouth Daily.  - Cont home Ativan 1mg BID PRN

## 2025-04-28 NOTE — ASSESSMENT & PLAN NOTE
124/76 today  Orders:    Increase metoprolol succinate XL (TOPROL-XL) 25 MG 24 hr tablet; Take 1 tablet by mouth Daily.  - Cont home benazepril-HCTZ 10/12.5 daily

## 2025-04-29 LAB
25(OH)D3 SERPL-MCNC: 66.8 NG/ML (ref 30–100)
ALBUMIN SERPL-MCNC: 4.4 G/DL (ref 3.5–5.2)
ALBUMIN/GLOB SERPL: 1.3 G/DL
ALP SERPL-CCNC: 95 U/L (ref 39–117)
ALT SERPL W P-5'-P-CCNC: 37 U/L (ref 1–33)
ANION GAP SERPL CALCULATED.3IONS-SCNC: 11 MMOL/L (ref 5–15)
AST SERPL-CCNC: 39 U/L (ref 1–32)
BASOPHILS # BLD AUTO: 0.07 10*3/MM3 (ref 0–0.2)
BASOPHILS NFR BLD AUTO: 0.7 % (ref 0–1.5)
BILIRUB SERPL-MCNC: 0.3 MG/DL (ref 0–1.2)
BUN SERPL-MCNC: 17 MG/DL (ref 8–23)
BUN/CREAT SERPL: 18.9 (ref 7–25)
CALCIUM SPEC-SCNC: 10 MG/DL (ref 8.6–10.5)
CHLORIDE SERPL-SCNC: 99 MMOL/L (ref 98–107)
CHOLEST SERPL-MCNC: 210 MG/DL (ref 0–200)
CO2 SERPL-SCNC: 27 MMOL/L (ref 22–29)
CREAT SERPL-MCNC: 0.9 MG/DL (ref 0.57–1)
DEPRECATED RDW RBC AUTO: 43.8 FL (ref 37–54)
EGFRCR SERPLBLD CKD-EPI 2021: 67.6 ML/MIN/1.73
EOSINOPHIL # BLD AUTO: 0.22 10*3/MM3 (ref 0–0.4)
EOSINOPHIL NFR BLD AUTO: 2.3 % (ref 0.3–6.2)
ERYTHROCYTE [DISTWIDTH] IN BLOOD BY AUTOMATED COUNT: 12.6 % (ref 12.3–15.4)
GLOBULIN UR ELPH-MCNC: 3.3 GM/DL
GLUCOSE SERPL-MCNC: 100 MG/DL (ref 65–99)
HBA1C MFR BLD: 6.5 % (ref 4.8–5.6)
HCT VFR BLD AUTO: 43.8 % (ref 34–46.6)
HDLC SERPL-MCNC: 54 MG/DL (ref 40–60)
HGB BLD-MCNC: 14.4 G/DL (ref 12–15.9)
IMM GRANULOCYTES # BLD AUTO: 0.04 10*3/MM3 (ref 0–0.05)
IMM GRANULOCYTES NFR BLD AUTO: 0.4 % (ref 0–0.5)
LDLC SERPL CALC-MCNC: 119 MG/DL (ref 0–100)
LDLC/HDLC SERPL: 2.11 {RATIO}
LYMPHOCYTES # BLD AUTO: 2.44 10*3/MM3 (ref 0.7–3.1)
LYMPHOCYTES NFR BLD AUTO: 25.9 % (ref 19.6–45.3)
MCH RBC QN AUTO: 30.9 PG (ref 26.6–33)
MCHC RBC AUTO-ENTMCNC: 32.9 G/DL (ref 31.5–35.7)
MCV RBC AUTO: 94 FL (ref 79–97)
MONOCYTES # BLD AUTO: 0.69 10*3/MM3 (ref 0.1–0.9)
MONOCYTES NFR BLD AUTO: 7.3 % (ref 5–12)
NEUTROPHILS NFR BLD AUTO: 5.95 10*3/MM3 (ref 1.7–7)
NEUTROPHILS NFR BLD AUTO: 63.4 % (ref 42.7–76)
NRBC BLD AUTO-RTO: 0 /100 WBC (ref 0–0.2)
PLATELET # BLD AUTO: 335 10*3/MM3 (ref 140–450)
PMV BLD AUTO: 9.2 FL (ref 6–12)
POTASSIUM SERPL-SCNC: 4.8 MMOL/L (ref 3.5–5.2)
PROT SERPL-MCNC: 7.7 G/DL (ref 6–8.5)
RBC # BLD AUTO: 4.66 10*6/MM3 (ref 3.77–5.28)
SODIUM SERPL-SCNC: 137 MMOL/L (ref 136–145)
T4 FREE SERPL-MCNC: 1.11 NG/DL (ref 0.92–1.68)
TRIGL SERPL-MCNC: 210 MG/DL (ref 0–150)
TSH SERPL DL<=0.05 MIU/L-ACNC: 0.61 UIU/ML (ref 0.27–4.2)
VIT B12 BLD-MCNC: >2000 PG/ML (ref 211–946)
VLDLC SERPL-MCNC: 37 MG/DL (ref 5–40)
WBC NRBC COR # BLD AUTO: 9.41 10*3/MM3 (ref 3.4–10.8)

## 2025-05-19 ENCOUNTER — OFFICE VISIT (OUTPATIENT)
Dept: FAMILY MEDICINE CLINIC | Facility: CLINIC | Age: 75
End: 2025-05-19
Payer: MEDICARE

## 2025-05-19 ENCOUNTER — LAB (OUTPATIENT)
Dept: FAMILY MEDICINE CLINIC | Facility: CLINIC | Age: 75
End: 2025-05-19
Payer: MEDICARE

## 2025-05-19 VITALS
HEIGHT: 66 IN | HEART RATE: 88 BPM | SYSTOLIC BLOOD PRESSURE: 113 MMHG | BODY MASS INDEX: 36.67 KG/M2 | OXYGEN SATURATION: 95 % | WEIGHT: 228.2 LBS | RESPIRATION RATE: 18 BRPM | DIASTOLIC BLOOD PRESSURE: 76 MMHG

## 2025-05-19 DIAGNOSIS — R30.0 DYSURIA: Primary | ICD-10-CM

## 2025-05-19 LAB
BILIRUB UR QL STRIP: NEGATIVE
CLARITY UR: ABNORMAL
COLOR UR: ABNORMAL
GLUCOSE UR STRIP-MCNC: NEGATIVE MG/DL
HGB UR QL STRIP.AUTO: ABNORMAL
HOLD SPECIMEN: NORMAL
KETONES UR QL STRIP: NEGATIVE
LEUKOCYTE ESTERASE UR QL STRIP.AUTO: ABNORMAL
NITRITE UR QL STRIP: POSITIVE
PH UR STRIP.AUTO: 6 [PH] (ref 5–8)
PROT UR QL STRIP: ABNORMAL
SP GR UR STRIP: 1.01 (ref 1–1.03)
UROBILINOGEN UR QL STRIP: ABNORMAL

## 2025-05-19 PROCEDURE — 3074F SYST BP LT 130 MM HG: CPT | Performed by: FAMILY MEDICINE

## 2025-05-19 PROCEDURE — 81001 URINALYSIS AUTO W/SCOPE: CPT | Performed by: FAMILY MEDICINE

## 2025-05-19 PROCEDURE — 99213 OFFICE O/P EST LOW 20 MIN: CPT | Performed by: FAMILY MEDICINE

## 2025-05-19 PROCEDURE — 1126F AMNT PAIN NOTED NONE PRSNT: CPT | Performed by: FAMILY MEDICINE

## 2025-05-19 PROCEDURE — 87186 SC STD MICRODIL/AGAR DIL: CPT | Performed by: FAMILY MEDICINE

## 2025-05-19 PROCEDURE — 3078F DIAST BP <80 MM HG: CPT | Performed by: FAMILY MEDICINE

## 2025-05-19 PROCEDURE — 1159F MED LIST DOCD IN RCRD: CPT | Performed by: FAMILY MEDICINE

## 2025-05-19 PROCEDURE — 87086 URINE CULTURE/COLONY COUNT: CPT | Performed by: FAMILY MEDICINE

## 2025-05-19 PROCEDURE — 87088 URINE BACTERIA CULTURE: CPT | Performed by: FAMILY MEDICINE

## 2025-05-19 PROCEDURE — 1160F RVW MEDS BY RX/DR IN RCRD: CPT | Performed by: FAMILY MEDICINE

## 2025-05-19 RX ORDER — SULFAMETHOXAZOLE AND TRIMETHOPRIM 800; 160 MG/1; MG/1
1 TABLET ORAL 2 TIMES DAILY
Qty: 10 TABLET | Refills: 0 | Status: SHIPPED | OUTPATIENT
Start: 2025-05-19 | End: 2025-05-24

## 2025-05-19 NOTE — PROGRESS NOTES
Chief Complaint   Patient presents with    Difficulty Urinating     Burning and painful x5 days     HPI  Kourtney Siddiqi is a 75 y.o. female that presents for   Chief Complaint   Patient presents with    Difficulty Urinating     Burning and painful x5 days     Dysuria: patient reports burning and pain w/ urination that started 5 days ago, initially improved but returned 2 days ago. No fever, back pain. She has started taking Azo. These symptoms are typical of UTI. Generally gets a few UTIs/year    Review of Systems  Pertinent positives of ROS documented in HPI    The following portions of the patient's history were reviewed and updated as appropriate: problem list, past medical history, past surgical history, allergies, current medication    Problem List Tab  Patient History Tab  Immunizations Tab  Medications Tab  Chart Review Tab  Care Everywhere Tab  Synopsis Tab    PE  Vitals:    05/19/25 1347   BP: 113/76   Pulse: 88   Resp: 18   SpO2: 95%     Body mass index is 37.38 kg/m².  General: Obese, NAD  Head: AT/NC  Eyes: EOMI, anicteric sclera  Resp: CTAB, SCR, BS equal  CV: RRR w/o m/r/g; 2+ pulses  GI: Soft, NT/ND, +BS  MSK: FROM, no deformity, no edema  Skin: Warm, dry, intact  Neuro: Alert and oriented. No focal deficits  Psych: Appropriate mood and affect    Imaging  No Images in the past 120 days found..    Assessment & Plan   Kourtney Siddiqi is a 75 y.o. female that presents for   Chief Complaint   Patient presents with    Difficulty Urinating     Burning and painful x5 days     Diagnoses and all orders for this visit:    1. Dysuria (Primary)  -     sulfamethoxazole-trimethoprim (Bactrim DS) 800-160 MG per tablet; Take 1 tablet by mouth 2 (Two) Times a Day for 5 days.  Dispense: 10 tablet; Refill: 0  -     Urinalysis With Culture If Indicated - Urine, Clean Catch         Return if symptoms worsen or fail to improve.

## 2025-05-20 LAB
BACTERIA UR QL AUTO: ABNORMAL /HPF
HYALINE CASTS UR QL AUTO: ABNORMAL /LPF
RBC # UR STRIP: ABNORMAL /HPF
REF LAB TEST METHOD: ABNORMAL
SQUAMOUS #/AREA URNS HPF: ABNORMAL /HPF
WBC # UR STRIP: ABNORMAL /HPF

## 2025-05-22 LAB — BACTERIA SPEC AEROBE CULT: ABNORMAL

## 2025-06-06 DIAGNOSIS — K21.9 GASTROESOPHAGEAL REFLUX DISEASE, UNSPECIFIED WHETHER ESOPHAGITIS PRESENT: ICD-10-CM

## 2025-06-06 RX ORDER — OMEPRAZOLE 40 MG/1
40 CAPSULE, DELAYED RELEASE ORAL DAILY
Qty: 90 CAPSULE | Refills: 1 | Status: SHIPPED | OUTPATIENT
Start: 2025-06-06

## 2025-07-10 ENCOUNTER — OFFICE VISIT (OUTPATIENT)
Dept: FAMILY MEDICINE CLINIC | Facility: CLINIC | Age: 75
End: 2025-07-10
Payer: MEDICARE

## 2025-07-10 VITALS
HEART RATE: 80 BPM | DIASTOLIC BLOOD PRESSURE: 73 MMHG | HEIGHT: 66 IN | SYSTOLIC BLOOD PRESSURE: 110 MMHG | WEIGHT: 228 LBS | RESPIRATION RATE: 18 BRPM | BODY MASS INDEX: 36.64 KG/M2 | OXYGEN SATURATION: 96 %
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
## 2025-07-10 DIAGNOSIS — S80.11XA HEMATOMA OF RIGHT LOWER LEG: ICD-10-CM

## 2025-07-10 DIAGNOSIS — S89.91XA INJURY OF RIGHT LOWER EXTREMITY, INITIAL ENCOUNTER: Primary | ICD-10-CM

## 2025-07-10 NOTE — PROGRESS NOTES
"Chief Complaint  Leg Injury (Patient fell through deck last Tuesday. Right leg)  Subjective        Kourtney Siddiqi presents to [facility]  History of Present Illness  Pt comes in today with c/o right leg injury.  States she had an injury last week on 7/1. Feel through a rotted deck board. No open wounds, but some scrapes and bruising.   Right medial leg near knee has been swollen, tight, bruised and warm at times.  No pain with walking.   Leg Injury       Objective     Vital Signs:   /73   Pulse 80   Resp 18   Ht 166.4 cm (65.51\")   Wt 103 kg (228 lb)   SpO2 96%   BMI 37.35 kg/m²       BP Readings from Last 3 Encounters:   07/10/25 110/73   05/19/25 113/76   04/28/25 124/76       Wt Readings from Last 3 Encounters:   07/10/25 103 kg (228 lb)   05/19/25 104 kg (228 lb 3.2 oz)   04/28/25 102 kg (224 lb)     Physical Exam  Constitutional:       Appearance: She is well-developed.   Eyes:      Pupils: Pupils are equal, round, and reactive to light.   Cardiovascular:      Rate and Rhythm: Normal rate and regular rhythm.   Pulmonary:      Effort: Pulmonary effort is normal.      Breath sounds: Normal breath sounds.   Musculoskeletal:        Legs:       Comments: Large tender hematoma medial leg just below knee. Lower leg bruising.    Neurological:      Mental Status: She is alert and oriented to person, place, and time.        Result Review :                 Assessment and Plan    Diagnoses and all orders for this visit:    1. Injury of right lower extremity, initial encounter (Primary)  -     Tdap Vaccine Greater Than or Equal To 6yo IM    2. Fall, initial encounter    3. Hematoma of right lower leg    Tdap today  Warm compresses  Follow up as needed  During this office visit, we discussed the pertinent aspects of the visit and treatment recommendations. Pt verbalizes understanding. Follow up was discussed. Patient was given the opportunity to ask questions and discuss other " concerns.         Follow Up   Return if symptoms worsen or fail to improve.  Patient was given instructions and counseling regarding her condition or for health maintenance advice. Please see specific information pulled into the AVS if appropriate.

## 2025-08-07 DIAGNOSIS — F41.1 GENERALIZED ANXIETY DISORDER: ICD-10-CM

## 2025-08-07 DIAGNOSIS — G47.00 INSOMNIA, UNSPECIFIED TYPE: ICD-10-CM

## 2025-08-07 RX ORDER — TRAZODONE HYDROCHLORIDE 100 MG/1
100 TABLET ORAL NIGHTLY
Qty: 90 TABLET | Refills: 0 | Status: SHIPPED | OUTPATIENT
Start: 2025-08-07

## 2025-08-13 DIAGNOSIS — M79.89 LEG SWELLING: ICD-10-CM

## 2025-08-13 DIAGNOSIS — I10 PRIMARY HYPERTENSION: ICD-10-CM

## 2025-08-13 RX ORDER — BENAZEPRIL HYDROCHLORIDE AND HYDROCHLOROTHIAZIDE 10; 12.5 MG/1; MG/1
1 TABLET ORAL DAILY
Qty: 90 TABLET | Refills: 1 | Status: SHIPPED | OUTPATIENT
Start: 2025-08-13

## 2025-08-25 ENCOUNTER — HOSPITAL ENCOUNTER (OUTPATIENT)
Facility: HOSPITAL | Age: 75
Discharge: HOME | End: 2025-08-25
Attending: EMERGENCY MEDICINE | Admitting: EMERGENCY MEDICINE
Payer: MEDICARE

## 2025-08-25 VITALS
DIASTOLIC BLOOD PRESSURE: 78 MMHG | SYSTOLIC BLOOD PRESSURE: 128 MMHG | TEMPERATURE: 98.7 F | OXYGEN SATURATION: 95 % | HEART RATE: 77 BPM | HEIGHT: 65 IN | BODY MASS INDEX: 37.15 KG/M2 | WEIGHT: 223 LBS | RESPIRATION RATE: 18 BRPM

## 2025-08-25 DIAGNOSIS — R50.9 FEVER IN ADULT: Primary | ICD-10-CM

## 2025-08-25 LAB
BACTERIA UR QL AUTO: ABNORMAL /HPF
BILIRUB UR QL STRIP: NEGATIVE
CLARITY UR: CLEAR
COLOR UR: YELLOW
FLUAV SUBTYP SPEC NAA+PROBE: NOT DETECTED
FLUBV RNA NPH QL NAA+NON-PROBE: NOT DETECTED
GLUCOSE UR STRIP-MCNC: NEGATIVE MG/DL
HGB UR QL STRIP.AUTO: NEGATIVE
HYALINE CASTS UR QL AUTO: ABNORMAL /LPF
KETONES UR QL STRIP: ABNORMAL
LEUKOCYTE ESTERASE UR QL STRIP.AUTO: ABNORMAL
NITRITE UR QL STRIP: NEGATIVE
PH UR STRIP.AUTO: <=5 [PH] (ref 5–8)
PROT UR QL STRIP: NEGATIVE
RBC # UR STRIP: ABNORMAL /HPF
REF LAB TEST METHOD: ABNORMAL
SARS-COV-2 RNA RESP QL NAA+PROBE: NOT DETECTED
SP GR UR STRIP: <=1.005 (ref 1–1.03)
SQUAMOUS #/AREA URNS HPF: ABNORMAL /HPF
UROBILINOGEN UR QL STRIP: ABNORMAL
WBC # UR STRIP: ABNORMAL /HPF

## 2025-08-25 PROCEDURE — 87636 SARSCOV2 & INF A&B AMP PRB: CPT

## 2025-08-25 PROCEDURE — 81001 URINALYSIS AUTO W/SCOPE: CPT | Performed by: EMERGENCY MEDICINE

## 2025-08-25 PROCEDURE — G0463 HOSPITAL OUTPT CLINIC VISIT: HCPCS

## 2025-08-25 PROCEDURE — 87086 URINE CULTURE/COLONY COUNT: CPT | Performed by: EMERGENCY MEDICINE

## 2025-08-27 LAB — BACTERIA SPEC AEROBE CULT: NO GROWTH

## 2025-08-29 DIAGNOSIS — F41.9 ANXIETY: ICD-10-CM

## 2025-08-30 PROBLEM — J20.9 ACUTE BRONCHITIS: Status: ACTIVE | Noted: 2025-08-30

## 2025-08-30 RX ORDER — LORAZEPAM 1 MG/1
1 TABLET ORAL 2 TIMES DAILY PRN
Qty: 180 TABLET | Refills: 0 | Status: SHIPPED | OUTPATIENT
Start: 2025-08-30

## (undated) DEVICE — CATH DIAG IMPULSE PIG .056 6F 110CM

## (undated) DEVICE — GW DIAG EMERALD HEPCOAT MOVE JTIP STD .035 3MM 150CM

## (undated) DEVICE — PK TRY HEART CATH 50

## (undated) DEVICE — MYNXGRIP 6F/7F: Brand: MYNXGRIP

## (undated) DEVICE — CATH DIAG IMPULSE FL4 6F 100CM

## (undated) DEVICE — PINNACLE INTRODUCER SHEATH: Brand: PINNACLE

## (undated) DEVICE — CATH DIAG IMPULSE FR4 6F 100CM